# Patient Record
Sex: MALE | Race: WHITE | Employment: UNEMPLOYED | URBAN - METROPOLITAN AREA
[De-identification: names, ages, dates, MRNs, and addresses within clinical notes are randomized per-mention and may not be internally consistent; named-entity substitution may affect disease eponyms.]

---

## 2020-06-27 ENCOUNTER — TRANSFERRED RECORDS (OUTPATIENT)
Dept: HEALTH INFORMATION MANAGEMENT | Facility: CLINIC | Age: 65
End: 2020-06-27

## 2020-06-29 NOTE — PROGRESS NOTES
[facility]  Transfer Triage Note    Date of call: 06/29/20  Time of call: 12:38 PM    Is pandemic COVID-19 a concern? NO    Reason for transfer: Procedure can be done here and not at referring hospital   Diagnosis: Infected pancreatic pseudocyst    Outside Records: Available  Additional records requested to be faxed to [phone].    Stability of Patient: Patient is vitally stable, with no critical labs, and will likely remain stable throughout the transfer process  ICU: No    Expected Time of Arrival for Transfer: 0-8 hours    Arrival Location:  [address] [address] Phone: [phone]    Recommendations for Management and Stabilization: Given    Additional Comments 64 year old male with history of necrotizing fasciitis earlier this year with formation of a pancreatic pseudocyst who was admitted to [facility] due to infection of pancreatic pseudocyst. He is being transferred with recommendation for necrosectomy.     Currently awake, alert, and oriented with stable vitals. Afebrile during current stay, blood pressures in 120-130s/70s. He is currently on zosyn. He had an ERCP done on 6/27 with stent placement. He had NJ tube placement on 6/28 and is tolerating tube feeds.    Imaging will be pushed including: CT Abd and Pelvis 6/29,  ERCP images 6/27, CT Abd and Pelvis 6/19, CTA chest 5/16, CT Abd and Pelvis 5/14.    Justin Chairez, MS4      Addendum     7/1/2020 update: patient remains stable.  NJ fell out, planning to replace unless bed becomes available and then would not delay transfer.  Last negative COVID test 6/27/20.    Christy Salcido MD

## 2020-07-02 ENCOUNTER — HOSPITAL ENCOUNTER (INPATIENT)
Facility: CLINIC | Age: 65
LOS: 14 days | Discharge: HOME | DRG: 405 | End: 2020-07-16
Attending: INTERNAL MEDICINE | Admitting: INTERNAL MEDICINE
Payer: OTHER GOVERNMENT

## 2020-07-02 DIAGNOSIS — K85.91 NECROTIZING PANCREATITIS: Primary | ICD-10-CM

## 2020-07-02 LAB
ALBUMIN SERPL-MCNC: 2.2 G/DL (ref 3.4–5)
ALP SERPL-CCNC: 430 U/L (ref 40–150)
ALT SERPL W P-5'-P-CCNC: 26 U/L (ref 0–70)
ANION GAP SERPL CALCULATED.3IONS-SCNC: 5 MMOL/L (ref 3–14)
AST SERPL W P-5'-P-CCNC: 21 U/L (ref 0–45)
BILIRUB SERPL-MCNC: 0.9 MG/DL (ref 0.2–1.3)
BUN SERPL-MCNC: 5 MG/DL (ref 7–30)
CALCIUM SERPL-MCNC: 8.4 MG/DL (ref 8.5–10.1)
CHLORIDE SERPL-SCNC: 100 MMOL/L (ref 94–109)
CO2 SERPL-SCNC: 28 MMOL/L (ref 20–32)
CREAT SERPL-MCNC: 0.68 MG/DL (ref 0.66–1.25)
ERYTHROCYTE [DISTWIDTH] IN BLOOD BY AUTOMATED COUNT: 16.1 % (ref 10–15)
GFR SERPL CREATININE-BSD FRML MDRD: >90 ML/MIN/{1.73_M2}
GLUCOSE BLDC GLUCOMTR-MCNC: 145 MG/DL (ref 70–99)
GLUCOSE BLDC GLUCOMTR-MCNC: 168 MG/DL (ref 70–99)
GLUCOSE SERPL-MCNC: 142 MG/DL (ref 70–99)
HBA1C MFR BLD: 6.3 % (ref 0–5.6)
HCT VFR BLD AUTO: 31.3 % (ref 40–53)
HGB BLD-MCNC: 9.6 G/DL (ref 13.3–17.7)
INR PPP: 1.19 (ref 0.86–1.14)
MCH RBC QN AUTO: 28.2 PG (ref 26.5–33)
MCHC RBC AUTO-ENTMCNC: 30.7 G/DL (ref 31.5–36.5)
MCV RBC AUTO: 92 FL (ref 78–100)
PLATELET # BLD AUTO: 330 10E9/L (ref 150–450)
POTASSIUM SERPL-SCNC: 3.6 MMOL/L (ref 3.4–5.3)
PROT SERPL-MCNC: 7.3 G/DL (ref 6.8–8.8)
RBC # BLD AUTO: 3.41 10E12/L (ref 4.4–5.9)
SODIUM SERPL-SCNC: 134 MMOL/L (ref 133–144)
WBC # BLD AUTO: 7.1 10E9/L (ref 4–11)

## 2020-07-02 PROCEDURE — 83036 HEMOGLOBIN GLYCOSYLATED A1C: CPT | Performed by: STUDENT IN AN ORGANIZED HEALTH CARE EDUCATION/TRAINING PROGRAM

## 2020-07-02 PROCEDURE — 12000001 ZZH R&B MED SURG/OB UMMC

## 2020-07-02 PROCEDURE — 00000146 ZZHCL STATISTIC GLUCOSE BY METER IP

## 2020-07-02 PROCEDURE — 85027 COMPLETE CBC AUTOMATED: CPT | Performed by: STUDENT IN AN ORGANIZED HEALTH CARE EDUCATION/TRAINING PROGRAM

## 2020-07-02 PROCEDURE — 36415 COLL VENOUS BLD VENIPUNCTURE: CPT | Performed by: STUDENT IN AN ORGANIZED HEALTH CARE EDUCATION/TRAINING PROGRAM

## 2020-07-02 PROCEDURE — 85610 PROTHROMBIN TIME: CPT | Performed by: STUDENT IN AN ORGANIZED HEALTH CARE EDUCATION/TRAINING PROGRAM

## 2020-07-02 PROCEDURE — 99233 SBSQ HOSP IP/OBS HIGH 50: CPT | Mod: GC | Performed by: INTERNAL MEDICINE

## 2020-07-02 PROCEDURE — 99207 ZZC CDG-HISTORY COMP: MEETS EXP. PROBLEM FOCUSED - DOWN CODED LACK OF HPI: CPT | Performed by: INTERNAL MEDICINE

## 2020-07-02 PROCEDURE — 25000128 H RX IP 250 OP 636: Performed by: STUDENT IN AN ORGANIZED HEALTH CARE EDUCATION/TRAINING PROGRAM

## 2020-07-02 PROCEDURE — 80053 COMPREHEN METABOLIC PANEL: CPT | Performed by: STUDENT IN AN ORGANIZED HEALTH CARE EDUCATION/TRAINING PROGRAM

## 2020-07-02 RX ORDER — ONDANSETRON 2 MG/ML
4 INJECTION INTRAMUSCULAR; INTRAVENOUS EVERY 6 HOURS PRN
Status: DISCONTINUED | OUTPATIENT
Start: 2020-07-02 | End: 2020-07-16 | Stop reason: HOSPADM

## 2020-07-02 RX ORDER — LIDOCAINE 40 MG/G
CREAM TOPICAL
Status: DISCONTINUED | OUTPATIENT
Start: 2020-07-02 | End: 2020-07-16 | Stop reason: HOSPADM

## 2020-07-02 RX ORDER — NALOXONE HYDROCHLORIDE 0.4 MG/ML
.1-.4 INJECTION, SOLUTION INTRAMUSCULAR; INTRAVENOUS; SUBCUTANEOUS
Status: DISCONTINUED | OUTPATIENT
Start: 2020-07-02 | End: 2020-07-16 | Stop reason: HOSPADM

## 2020-07-02 RX ORDER — PIPERACILLIN SODIUM, TAZOBACTAM SODIUM 3; .375 G/15ML; G/15ML
3.38 INJECTION, POWDER, LYOPHILIZED, FOR SOLUTION INTRAVENOUS EVERY 6 HOURS
Status: DISCONTINUED | OUTPATIENT
Start: 2020-07-02 | End: 2020-07-08

## 2020-07-02 RX ORDER — AMOXICILLIN 250 MG
1 CAPSULE ORAL 2 TIMES DAILY
Status: DISCONTINUED | OUTPATIENT
Start: 2020-07-02 | End: 2020-07-16 | Stop reason: HOSPADM

## 2020-07-02 RX ORDER — ONDANSETRON 4 MG/1
4 TABLET, ORALLY DISINTEGRATING ORAL EVERY 6 HOURS PRN
Status: DISCONTINUED | OUTPATIENT
Start: 2020-07-02 | End: 2020-07-16 | Stop reason: HOSPADM

## 2020-07-02 RX ORDER — AMOXICILLIN 250 MG
2 CAPSULE ORAL 2 TIMES DAILY
Status: DISCONTINUED | OUTPATIENT
Start: 2020-07-02 | End: 2020-07-16 | Stop reason: HOSPADM

## 2020-07-02 RX ORDER — DEXTROSE MONOHYDRATE 25 G/50ML
25-50 INJECTION, SOLUTION INTRAVENOUS
Status: DISCONTINUED | OUTPATIENT
Start: 2020-07-02 | End: 2020-07-07

## 2020-07-02 RX ORDER — NICOTINE POLACRILEX 4 MG
15-30 LOZENGE BUCCAL
Status: DISCONTINUED | OUTPATIENT
Start: 2020-07-02 | End: 2020-07-07

## 2020-07-02 RX ADMIN — PIPERACILLIN AND TAZOBACTAM 3.38 G: 3; .375 INJECTION, POWDER, FOR SOLUTION INTRAVENOUS at 21:00

## 2020-07-02 ASSESSMENT — ENCOUNTER SYMPTOMS
HEADACHES: 0
ABDOMINAL PAIN: 0
AGITATION: 0
ABDOMINAL DISTENTION: 1
DIZZINESS: 0
SHORTNESS OF BREATH: 0
DIFFICULTY URINATING: 0
MYALGIAS: 0
FREQUENCY: 0
ACTIVITY CHANGE: 0
WEAKNESS: 0
FEVER: 0
COLOR CHANGE: 0
DYSURIA: 0
RHINORRHEA: 0
FATIGUE: 0
PALPITATIONS: 0
SORE THROAT: 0
BACK PAIN: 0
VOMITING: 0
LIGHT-HEADEDNESS: 0
BLOOD IN STOOL: 0
NAUSEA: 0
COUGH: 0
CHEST TIGHTNESS: 0
ARTHRALGIAS: 0
DIARRHEA: 0
CONSTIPATION: 0

## 2020-07-02 ASSESSMENT — ACTIVITIES OF DAILY LIVING (ADL): ADLS_ACUITY_SCORE: 17

## 2020-07-02 NOTE — PLAN OF CARE
Pt brought in by ambulance from [facility] @ 5:50 PM. Per report, admitted for necrotizing pancreatitis, pancreatic pseudocyst -requiring surgery. Patient oriented to room, call light and phone. VSS on RA. Entry point assessment done. 2RN full skin assessment completed. Denies SOB, nausea, pain. Per MD, keep NPO for now. PIV from previous hospital in place, saline locked. UAL in room/halls. Continue with POC.

## 2020-07-02 NOTE — H&P
[facility], [place]    History and Physical - [place] 2 Service        Date of Admission:  7/2/2020    Assessment & Plan   Alexandr Almonte is a 65yo male with a history of HTN and complicated necrotizing pancreatitis with recent CT findings of a complex cystic structure and air within a worsening pseudocyst concerning for infection. He is presenting as a transfer from [facility] for surgical evaluation by GI for the pseudocyst on 07/02/20.    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 Most recent OSH CT imaging (6/28) shows progressive change with a large complex cystic structure in the mid abdomen and a worsening pancreatic pseudocyst with evidence of air concerning for infection. He is currently on Zosyn for suspected infection of the pseudocyst.   - Will call OSH and get imaging pushed to PACS  - GI consult, appreciate recs  - Creon 24,000 TID with meals  - Antibiotics:   - Zosyn 3.375g q6hrs  - Omeprazole 20mg PO BID     #Pancreatitis induced diabetes   - Insulin glargine 7U twice daily while NPO  - sliding scale insulin  - Hypoglycemic protocol    #Superior venous thrombosis, mesenteric venous thrombosis  The patient was initiated on warfarin and most recently on therapeutic dosing of lovenox. On the most recent CTA (6/28), there was no visualization of the splenic thrombosis.   - hold lovenox for possible GI procedure    #HTN  - hold home lisinopril 10mg daily  - monitor BPs       Diet: NPO for Medical/Clinical Reasons Except for: Meds    Fluids: none  DVT Prophylaxis: Pneumatic Compression Devices  Teresa Catheter: not present  Code Status: Full Code           Disposition Plan   Expected discharge:  2 - 3 days, recommended to prior living arrangement once GI intervention.  Entered: Aminah Zepeda MD 07/02/2020, 6:39 PM       The patient's care was discussed with the Attending Physician, Dr. Velázquez.    Aminah Zepeda MD  [address], [place]  Pager: [phone]  Please see sticky note for cross cover information  ______________________________________________________________________    Chief Complaint   Transfer from OSH for surgical eval of pseudocyst    History is obtained from the patient    History of Present Illness   Norberto Almonte is a 64 year old male with a history of well controlled HTN and a diagnosis of idiopathic necrotizing pancreatitis complicated by splenic vein and superior mesenteric vein thrombosis, HARRIS, septic shock, acute hypoxic respiratory failure, pancreatitis induced diabetes, toxic encephalopathy and pseudocyst. Following an extended hospitalization, the patient represented to an OSH ED on 6/26 with complaints of n/v and was transferred to [facility] for further evaluation. Though afebrile and without leukocytosis, CT imaging at the time of OSH admission (6/28) showed progressive change with a large complex cystic structure in the mid abdomen and a worsening pancreatic pseudocyst with evidence of air concerning for infection. BCX were drawn and the patient was started on Zosyn. His lovenox was held in light of any possible procedures. , alk phos 552, Tbili 2.1 and normal LFTs at OSH.The patient was stable and transferred to [facility] for surgical evaluation and continued care.     Review of Systems    Review of Systems   Constitutional: Negative for activity change, fatigue and fever.   HENT: Negative for congestion, mouth sores, rhinorrhea and sore throat.    Respiratory: Negative for cough, chest tightness and shortness of breath.    Cardiovascular: Positive for leg swelling. Negative for chest pain and palpitations.        Minimal  LE swelling to mid-shin    Gastrointestinal: Positive for abdominal distention. Negative for abdominal pain, blood in stool, constipation, diarrhea, nausea and vomiting.   Genitourinary: Negative for difficulty urinating, dysuria and frequency.   Musculoskeletal: Negative for arthralgias, back pain and myalgias.   Skin: Negative for color change and rash.   Neurological: Negative for dizziness, weakness, light-headedness and headaches.   Psychiatric/Behavioral: Negative for agitation and suicidal ideas.       Past Medical History    - Necrotizing pancreatitis with development of pancreatic pseudocyst  - Superior vein thrombosis, mesenteric vein thrombosis  - Pancreatitis induced diabetes, now on insulin  - Hx acute toxic metabolic encephalopathy  - Hx of HARRIS with acute respiratory failure and septic shock during recent hospitalization  - HTN    Past Surgical History   Past surgical history review with no previous surgeries identified.    Social History   Minimal EtOH use, less than a few drinks a month  40yr Hx cigar smoking, no longer smoking    Family History   Father - MI,  at 34  Mother - Breast cancer,     Prior to Admission Medications   ASA 81mg daily  Lisinopril 10mg daily  Creon 24,000 (one capsule) TID with meals  Ondansetron 4mg q4hrs PRN  Lantus 14U subcutaneous twice daily  Omeprazole 20mg PO daily  Tums Prn for heartburn  Lovenox 75mg twice daily   Zosyn 3.375g TID    Allergies   No Known Allergies    Physical Exam   Vital Signs: Temp: 99  F (37.2  C) Temp src: Oral BP: 133/66 Pulse: 87   Resp: 18 SpO2: 95 % O2 Device: None (Room air)    Weight: 171 lbs 0 oz  GEN: walking around room, in no apparent distress  HEENT: nc/at. eomi, perrla. Moist oral mucosa without lesion  CARDIAC: rrr no m/g/r  LUNG: ctab no w/r/r. bilateral symmetric chest expansion.  ABDOMEN: soft, mild distention, mild tenderness to palpation over the epigastric area, no ttp in other quadrants, no guarding or  peritoneal signs. +bs in 4 quadrants. tympanic to percussion. No palpable organomegaly.   MSK/SKIN: skin warm and well-perfused. no rashes. 2+ bilateral pedal pulses. Trace pitting edema of the b/l LE.  NEURO: alert and oriented x 3. no focal neurologic deficits. 5/5 bilateral motor strength.    Data reviewed today: I reviewed all medications, new labs and imaging results over the last 24 hours.    No lab results found in last 7 days.

## 2020-07-02 NOTE — PROGRESS NOTES
Admitted/transferred from: [facility] in [place], [place]  2 RN full   skin assessment completed by Nallely Foreman, RN and Kulwinder Cantu, RN.  Skin assessment finding: issues found existing pressure ulcer (per pt) on left heel, scabbed over and healing   Interventions/actions: WOC consult ordered.     Will continue to monitor.

## 2020-07-03 LAB
ALBUMIN SERPL-MCNC: 2.2 G/DL (ref 3.4–5)
ALP SERPL-CCNC: 409 U/L (ref 40–150)
ALT SERPL W P-5'-P-CCNC: 23 U/L (ref 0–70)
ANION GAP SERPL CALCULATED.3IONS-SCNC: 4 MMOL/L (ref 3–14)
AST SERPL W P-5'-P-CCNC: 23 U/L (ref 0–45)
BILIRUB SERPL-MCNC: 1.1 MG/DL (ref 0.2–1.3)
BUN SERPL-MCNC: 4 MG/DL (ref 7–30)
CALCIUM SERPL-MCNC: 8.6 MG/DL (ref 8.5–10.1)
CHLORIDE SERPL-SCNC: 103 MMOL/L (ref 94–109)
CO2 SERPL-SCNC: 27 MMOL/L (ref 20–32)
CREAT SERPL-MCNC: 0.76 MG/DL (ref 0.66–1.25)
CRP SERPL-MCNC: 100 MG/L (ref 0–8)
ERYTHROCYTE [DISTWIDTH] IN BLOOD BY AUTOMATED COUNT: 16.3 % (ref 10–15)
GFR SERPL CREATININE-BSD FRML MDRD: >90 ML/MIN/{1.73_M2}
GLUCOSE BLDC GLUCOMTR-MCNC: 106 MG/DL (ref 70–99)
GLUCOSE BLDC GLUCOMTR-MCNC: 121 MG/DL (ref 70–99)
GLUCOSE BLDC GLUCOMTR-MCNC: 129 MG/DL (ref 70–99)
GLUCOSE BLDC GLUCOMTR-MCNC: 137 MG/DL (ref 70–99)
GLUCOSE BLDC GLUCOMTR-MCNC: 160 MG/DL (ref 70–99)
GLUCOSE BLDC GLUCOMTR-MCNC: 187 MG/DL (ref 70–99)
GLUCOSE SERPL-MCNC: 126 MG/DL (ref 70–99)
HCT VFR BLD AUTO: 33.9 % (ref 40–53)
HGB BLD-MCNC: 10.2 G/DL (ref 13.3–17.7)
MAGNESIUM SERPL-MCNC: 2 MG/DL (ref 1.6–2.3)
MCH RBC QN AUTO: 27.7 PG (ref 26.5–33)
MCHC RBC AUTO-ENTMCNC: 30.1 G/DL (ref 31.5–36.5)
MCV RBC AUTO: 92 FL (ref 78–100)
PHOSPHATE SERPL-MCNC: 2.8 MG/DL (ref 2.5–4.5)
PLATELET # BLD AUTO: 313 10E9/L (ref 150–450)
POTASSIUM SERPL-SCNC: 3.8 MMOL/L (ref 3.4–5.3)
PROT SERPL-MCNC: 7.1 G/DL (ref 6.8–8.8)
RBC # BLD AUTO: 3.68 10E12/L (ref 4.4–5.9)
SARS-COV-2 PCR COMMENT: NORMAL
SARS-COV-2 RNA SPEC QL NAA+PROBE: NEGATIVE
SARS-COV-2 RNA SPEC QL NAA+PROBE: NORMAL
SODIUM SERPL-SCNC: 135 MMOL/L (ref 133–144)
SPECIMEN SOURCE: NORMAL
SPECIMEN SOURCE: NORMAL
WBC # BLD AUTO: 7.5 10E9/L (ref 4–11)

## 2020-07-03 PROCEDURE — U0003 INFECTIOUS AGENT DETECTION BY NUCLEIC ACID (DNA OR RNA); SEVERE ACUTE RESPIRATORY SYNDROME CORONAVIRUS 2 (SARS-COV-2) (CORONAVIRUS DISEASE [COVID-19]), AMPLIFIED PROBE TECHNIQUE, MAKING USE OF HIGH THROUGHPUT TECHNOLOGIES AS DESCRIBED BY CMS-2020-01-R: HCPCS | Performed by: STUDENT IN AN ORGANIZED HEALTH CARE EDUCATION/TRAINING PROGRAM

## 2020-07-03 PROCEDURE — G0463 HOSPITAL OUTPT CLINIC VISIT: HCPCS

## 2020-07-03 PROCEDURE — 80053 COMPREHEN METABOLIC PANEL: CPT | Performed by: STUDENT IN AN ORGANIZED HEALTH CARE EDUCATION/TRAINING PROGRAM

## 2020-07-03 PROCEDURE — 25000131 ZZH RX MED GY IP 250 OP 636 PS 637: Performed by: STUDENT IN AN ORGANIZED HEALTH CARE EDUCATION/TRAINING PROGRAM

## 2020-07-03 PROCEDURE — 86140 C-REACTIVE PROTEIN: CPT | Performed by: STUDENT IN AN ORGANIZED HEALTH CARE EDUCATION/TRAINING PROGRAM

## 2020-07-03 PROCEDURE — 84100 ASSAY OF PHOSPHORUS: CPT | Performed by: STUDENT IN AN ORGANIZED HEALTH CARE EDUCATION/TRAINING PROGRAM

## 2020-07-03 PROCEDURE — 99207 ZZC CDG-CUT & PASTE-POTENTIAL IMPACT ON LEVEL: CPT | Performed by: HOSPITALIST

## 2020-07-03 PROCEDURE — 00000146 ZZHCL STATISTIC GLUCOSE BY METER IP

## 2020-07-03 PROCEDURE — 99233 SBSQ HOSP IP/OBS HIGH 50: CPT | Mod: GC | Performed by: HOSPITALIST

## 2020-07-03 PROCEDURE — 25000132 ZZH RX MED GY IP 250 OP 250 PS 637: Performed by: STUDENT IN AN ORGANIZED HEALTH CARE EDUCATION/TRAINING PROGRAM

## 2020-07-03 PROCEDURE — 36415 COLL VENOUS BLD VENIPUNCTURE: CPT | Performed by: STUDENT IN AN ORGANIZED HEALTH CARE EDUCATION/TRAINING PROGRAM

## 2020-07-03 PROCEDURE — 85027 COMPLETE CBC AUTOMATED: CPT | Performed by: STUDENT IN AN ORGANIZED HEALTH CARE EDUCATION/TRAINING PROGRAM

## 2020-07-03 PROCEDURE — 83735 ASSAY OF MAGNESIUM: CPT | Performed by: STUDENT IN AN ORGANIZED HEALTH CARE EDUCATION/TRAINING PROGRAM

## 2020-07-03 PROCEDURE — 25000128 H RX IP 250 OP 636: Performed by: STUDENT IN AN ORGANIZED HEALTH CARE EDUCATION/TRAINING PROGRAM

## 2020-07-03 PROCEDURE — 12000001 ZZH R&B MED SURG/OB UMMC

## 2020-07-03 RX ORDER — ASPIRIN 81 MG/1
81 TABLET, CHEWABLE ORAL DAILY
COMMUNITY

## 2020-07-03 RX ORDER — LIDOCAINE 40 MG/G
CREAM TOPICAL
Status: DISCONTINUED | OUTPATIENT
Start: 2020-07-03 | End: 2020-07-04 | Stop reason: HOSPADM

## 2020-07-03 RX ORDER — WARFARIN SODIUM 5 MG/1
5 TABLET ORAL
Status: ON HOLD | COMMUNITY
End: 2020-07-16

## 2020-07-03 RX ORDER — LISINOPRIL 10 MG/1
10 TABLET ORAL DAILY
COMMUNITY

## 2020-07-03 RX ORDER — ONDANSETRON 4 MG/1
4 TABLET, ORALLY DISINTEGRATING ORAL EVERY 8 HOURS PRN
COMMUNITY

## 2020-07-03 RX ORDER — MULTIPLE VITAMINS W/ MINERALS TAB 9MG-400MCG
1 TAB ORAL DAILY
COMMUNITY

## 2020-07-03 RX ORDER — CHLORAL HYDRATE 500 MG
2 CAPSULE ORAL DAILY
COMMUNITY

## 2020-07-03 RX ADMIN — INSULIN ASPART 1 UNITS: 100 INJECTION, SOLUTION INTRAVENOUS; SUBCUTANEOUS at 16:25

## 2020-07-03 RX ADMIN — PIPERACILLIN AND TAZOBACTAM 3.38 G: 3; .375 INJECTION, POWDER, FOR SOLUTION INTRAVENOUS at 16:23

## 2020-07-03 RX ADMIN — DOCUSATE SODIUM AND SENNOSIDES 1 TABLET: 8.6; 5 TABLET ORAL at 20:21

## 2020-07-03 RX ADMIN — PANCRELIPASE 1 CAPSULE: 24000; 76000; 120000 CAPSULE, DELAYED RELEASE PELLETS ORAL at 18:03

## 2020-07-03 RX ADMIN — PIPERACILLIN AND TAZOBACTAM 3.38 G: 3; .375 INJECTION, POWDER, FOR SOLUTION INTRAVENOUS at 10:48

## 2020-07-03 RX ADMIN — PANCRELIPASE 24000 UNITS: 24000; 76000; 120000 CAPSULE, DELAYED RELEASE PELLETS ORAL at 14:37

## 2020-07-03 RX ADMIN — INSULIN GLARGINE 7 UNITS: 100 INJECTION, SOLUTION SUBCUTANEOUS at 10:51

## 2020-07-03 RX ADMIN — PIPERACILLIN AND TAZOBACTAM 3.38 G: 3; .375 INJECTION, POWDER, FOR SOLUTION INTRAVENOUS at 03:49

## 2020-07-03 RX ADMIN — OMEPRAZOLE 20 MG: 20 CAPSULE, DELAYED RELEASE ORAL at 16:25

## 2020-07-03 RX ADMIN — INSULIN ASPART 1 UNITS: 100 INJECTION, SOLUTION INTRAVENOUS; SUBCUTANEOUS at 20:19

## 2020-07-03 RX ADMIN — INSULIN GLARGINE 7 UNITS: 100 INJECTION, SOLUTION SUBCUTANEOUS at 20:19

## 2020-07-03 RX ADMIN — PIPERACILLIN AND TAZOBACTAM 3.38 G: 3; .375 INJECTION, POWDER, FOR SOLUTION INTRAVENOUS at 21:26

## 2020-07-03 ASSESSMENT — ACTIVITIES OF DAILY LIVING (ADL)
ADLS_ACUITY_SCORE: 17

## 2020-07-03 NOTE — PROGRESS NOTES
"CLINICAL NUTRITION SERVICES - ASSESSMENT NOTE     Nutrition Prescription    RECOMMENDATIONS FOR MDs/PROVIDERS TO ORDER:  1. Recommend check fecal elastase to confirm pancreatic insufficiency and/or to assess severity of deficiency    2. Recommend increase Creon dose to 2-3 capsules Creon 24 with meals TID (640-960 units lipase/kg/meal) + PRN 2 capsules Creon 24 with snacks.      3. If/when FT placed and plan to start TF, please consult Registered Dietitian to Order TF per Medical Nutrition Therapy Guidelines.      Malnutrition Status:    Severe malnutrition in the context of acute illness    Recommendations already ordered by Registered Dietitian (RD):  Mg++ and Phos add-ons    Future/Additional Recommendations:  1. If plan to start TF, recommend the following:  -- Start Peptamen 1.5 (semi-elemental, fiber free) @ 10 mL/hr and advance by 10 mL Q8H as tolerated to goal rate 65 ml/hr  -- DO NOT start or advance TF unless K+/Mg++ >/= nrml and phos >1.9.  Pt is at refeeding risk.  -- 30 mL Q4H free water flushes at minimum for FT patency (does not provide full hydration).  -- GOAL: Peptamen 1.5 @ goal 65 ml/hr (1560 ml/day) to provide 2340 kcals (31 kcal/kg/day), 106 g PRO (1.4 g/kg/day), 1201 ml free H2O, 87 g Fat (70% from MCTs), 293 g CHO and no Fiber daily.     2. Monitor stooling/GI symptoms once start TF with semi-elemental formula.  If with bloating, steatorhea, or other signs of malabsorption consider need to mix enzymes into formula.     Unable to obtain in-person nutrition history or nutrition focused physical assessment (NFPA) from patient as the number of staff going into rooms is restricted to limit exposure and to minimize use of PPE.     REASON FOR ASSESSMENT  Norberto Almonte is a/an 64 year old male assessed by the dietitian for Provider Order - \"Malnutrician, poor PO, PERT adjustment\"    NUTRITION HISTORY  Obtained information from patient over the phone + chart review.  Pt report poor PO intake over " "the past 2 weeks 2/2 bloating, and overall decreased PO intake and weight loss since April 2020 when he was initially hospitalized.  Pt reports before April pt was on a regular diet and eating well/at his baseline.  Pt denies any known food allergies. Pt reports over the past 2 weeks he's been eating small amounts of \"light\" food (cereal, grapes, carrots, cheese, cranberry juice, orange juice, water, etc).  Pt says he started taking Creon in May (1 capsule with meals) but pt denies any improvements in diarrhea or pain with eating since starting this.       Per chart, PMH includes necrotizing pancreatitis with pancreatic pseudocyst.  Per WOC note today, pt with resolving stage 3 vs 4 pressure injury on left posterior heel    CURRENT NUTRITION ORDERS  Diet: Full Liquid  Intake/Tolerance: poor PO intake tolerance per patient today    LABS  Labs reviewed  - BUN 4 (L), may indicate low protein intake    MEDICATIONS  Medications reviewed  - Creon 24, 1 capsules TID with meals (this provides 320 units lipase/kg/meal, below recommended dosing range to 500-2500 units lipase/kg/meal)    ANTHROPOMETRICS  Height: 175.3 cm (5' 9\") per Care Everywhere  Most Recent Weight: 77.6 kg (171 lb)    IBW: 72.7 kg   BMI: Overweight BMI 25-29.9  Weight History: Pt reports his current weight is down 30 lb overall since 4/28/20 when he was initially hospitalized.  Per data below pt with 36 lb wt loss (18% wt loss) from 4/30-6/26.  Wt Readings from Last 10 Encounters:   07/02/20 77.6 kg (171 lb)   06/26/20 74.8 kg (165 lb) per Care Everywhere   04/30/20 91.3 kg (201 lb 4.8 oz) per Care Everywhere      Dosing Weight: 75 kg (actual, lowest recent wt on 6/26)    ASSESSED NUTRITION NEEDS  Estimated Energy Needs: 4279-7953+ kcals/day (30 - 35+ kcals/kg )  Justification: Increased needs with necrotizing pancreatitis, wound healing  Estimated Protein Needs: + grams protein/day (1.3 - 1.5+ grams of pro/kg)  Justification: Increased needs with " necrotizing pancreatitis, wound healing  Estimated Fluid Needs: 9124-9666 mL/day (1 mL/kcal)   Justification: Maintenance, or other per provider pending fluid status    PHYSICAL FINDINGS  See malnutrition section below.    MALNUTRITION  % Intake: </= 50% for >/= 5 days (severe)  % Weight Loss: > 7.5% in 3 months (severe)  Subcutaneous Fat Loss: Unable to assess  Muscle Loss: Unable to assess  Fluid Accumulation/Edema: None noted per chart review  Malnutrition Diagnosis: Severe malnutrition in the context of acute illness    Unable to obtain nutrition focused physical assessment (NFPA) from patient as the number of staff going into rooms is restricted to limit exposure and to minimize use of PPE. NFPA available per provider request.    NUTRITION DIAGNOSIS  Inadequate oral intake related to bloating/poor PO intake tolerance 2/2 pancreatitis as evidenced by poor PO intakes and unintentional wt lo    INTERVENTIONS  Implementation  Nutrition Education: Provided education on role of RD.  Discussed Creon dose recommendations RD will make to provider.   Briefly discussed low fat diet often recommended during pancreatitis.  Pt does not have any questions for RD today, but says he will likely have questions after his surgery tomorrow.  Let pt know of RD availability.  Collaboration with other providers: paged team with above recs    Goals  Diet adv v nutrition support within 2-3 days.     Monitoring/Evaluation  Progress toward goals will be monitored and evaluated per protocol.     Lauren Carrasco, BELÉN, LD  7C RD pager: [phone]

## 2020-07-03 NOTE — PROGRESS NOTES
[facility], [place]    Progress Note - [place] 2 Service        Date of Admission:  7/2/2020    Assessment & Plan   Alexandr Almonte is a 63yo male with a history of HTN and complicated necrotizing pancreatitis with recent CT findings of a complex cystic structure and air within a worsening pseudocyst concerning for infection. He is presenting as a transfer from [facility] for surgical evaluation by GI for the pseudocyst on 07/02/20.     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 Most recent OSH CT imaging (6/28) shows progressive change with a large complex cystic structure in the mid abdomen and a worsening pancreatic pseudocyst with evidence of air concerning for infection. He is currently on Zosyn for suspected infection of the pseudocyst. GI evaluated the patient and suspects that the gas in the collection is due to fistula and that the pseudocyst is causing gastric outlet obstruction. They plan for EUS transgastric drainage tomorrow with NJ placement.   - GI consult, appreciate recs   - Will go to OR tomorrow   - STAT Covid test for OR tomorrow  - Dietician consulted, appreciate recs   - Creon 2-3 capsules of 24,000 plus PRN 2 capsules with snacks TID with meals   - Fecal elastase ordered  - Antibiotics:              - Zosyn 3.375g q6hrs  - Omeprazole 20mg PO BID     #Pancreatitis induced diabetes   - Insulin glargine 7U twice daily while NPO  - sliding scale  insulin  - Hypoglycemic protocol     #Superior venous thrombosis, mesenteric venous thrombosis  The patient was initiated on warfarin and most recently on therapeutic dosing of lovenox. On the most recent CTA (6/28), there was no visualization of the splenic thrombosis.   - hold lovenox for possible GI procedure     #HTN  - hold home lisinopril 10mg daily  - monitor BPs        Diet: NPO for Medical/Clinical Reasons Except for: Meds    Fluids: none  DVT Prophylaxis: Pneumatic Compression Devices  Teresa Catheter: not present  Code Status: Full Code      Disposition Plan   Expected discharge: 2 - 3 days, recommended to prior living arrangement once adequate pain management/ tolerating PO medications and full surgical recovery.  Entered: Aminah Zepeda MD 07/03/2020, 2:41 PM       The patient's care was discussed with the Attending Physician, Dr. Barrow.    Aminah Zepeda MD  [address], [place]  Pager: [phone]  Please see sticky note for cross cover information  ______________________________________________________________________    Interval History   Patient had no events overnight. VSS, afebrile. He states that he feels as though his 'bloating' is worst this morning, but has no pain, nausea, or vomiting. He had a small, soft BM yesterday.     4pt review of systems negative except as indicated in HPI.    Data reviewed today: I reviewed all medications, new labs and imaging results over the last 24 hours.     Physical Exam   Vital Signs: Temp: 98.9  F (37.2  C) Temp src: Oral BP: 130/70 Pulse: 88   Resp: 16 SpO2: 96 % O2 Device: None (Room air)    Weight: 171 lbs 0 oz  GEN: resting comfortably in bed, appears in no apparent distress  HEENT: nc/at. eomi, perrla. Oral/nasal mucosa moist without ulceration  CARDIAC: rrr no m/g/r. no JVD noted.  LUNG: ctab no w/r/r. bilateral symmetric chest expansion.  ABDOMEN: soft, distended, non-tender to palpation. +bs in 4  quadrants. tympanic to percussion. No palpable organomegaly  MSK/SKIN: skin warm and well-perfused. no rashes. 2+ bilateral pedal pulses. , trace LE edema to the mid shin b/l  NEURO: alert and oriented x 3. no focal neurologic deficits. 5/5 bilateral motor strength.      Data   Reviewed.

## 2020-07-03 NOTE — PHARMACY-ADMISSION MEDICATION HISTORY
Admission medication history interview status for the 7/2/2020 admission is complete. See Epic admission navigator for allergy information, pharmacy, prior to admission medications and immunization status.     Medication history interview sources:  patient, [facility] [place], .[phone]    Changes made to PTA medication list (reason)  Added: all  Deleted: none  Changed: none    Additional medication history information (including reliability of information, actions taken by pharmacist):  -pt was somewhat unreliable historian, and was not able to articulate all meds by name and dosing. Information was clarified with [facility] pharmacy records  -pt could not recall exactly the last day he took warfarin  -on 6/21 pt filled enoxaparin 75 mg SQ q12h x4 doses, presumably as a bridge for warfarin, but this was not added to his PTA list as he completed the therapy      Prior to Admission medications    Medication Sig Last Dose Taking? Auth Provider   amylase-lipase-protease (CREON 24) 92538-13776 units CPEP per EC capsule Take 1 capsule by mouth 3 times daily (with meals) Past Week at Unknown time Yes Unknown, Entered By History   aspirin (ASA) 81 MG chewable tablet Take 81 mg by mouth daily Past Month at Unknown time Yes Unknown, Entered By History   fish oil-omega-3 fatty acids 1000 MG capsule Take 2 g by mouth daily Past Week at Unknown time Yes Unknown, Entered By History   lisinopril (ZESTRIL) 10 MG tablet Take 10 mg by mouth daily Past Week at Unknown time Yes Unknown, Entered By History   multivitamin w/minerals (THERA-VIT-M) tablet Take 1 tablet by mouth daily Past Week at Unknown time Yes Unknown, Entered By History   ondansetron (ZOFRAN-ODT) 4 MG ODT tab Take 4 mg by mouth every 8 hours as needed for nausea Past Week at Unknown time Yes Unknown, Entered By History   warfarin ANTICOAGULANT (COUMADIN) 5 MG tablet 5 mg Take 7.5 mg Tuesdays and Thursdays, take 5 mg rest of week (40 mg/week) Past Week at Unknown  time Yes Unknown, Entered By History       Medication history completed by:   Jasbir Mcgrath, PharmD, BCPS

## 2020-07-03 NOTE — PLAN OF CARE
Alert, oriented, UAL.    Denies pain, nausea. Voiding, passing gas, BM yesterday.  Started on IV abx.  Q4 BGs, has not needed Novalog.  NPO for possible GI procedure today.

## 2020-07-03 NOTE — PLAN OF CARE
Transfer from [facility] for surgical evaluation by GI for the pseudocyst on 07/02/20    Pain: denies   Nausea: denies   Lab: BS Q4- no coverage needed this shift   /70 (BP Location: Right arm)   Pulse 88   Temp 98.9  F (37.2  C) (Oral)   Resp 16   Wt 77.6 kg (171 lb)   SpO2 96%    Output: Voiding, not saving   Diet: Full liquid- NPO @ midnight   Activity: UAL   Bowel Function: Bowel sounds heard in all quadrants, passing flatus, no bm this shift   Lines: L PIV, infusing TKO in between abx, dressing CDI   Skin: L posterior heel resolving wound- seen by WOC today  Plan: NPO at midnight, plan for Esophagogastroduodenoscopy, with endoscopic excision of necrotic pancreatic tissue/ cystgastrostomy with NJ per MD note. Continue with plan of care.

## 2020-07-03 NOTE — PLAN OF CARE
OT/PT: orders received. Per discussion with RN and chart review, plans for surgery this date. Per RN, pt moving around ok in room. Will re-assess for OT/PT needs post procedure.

## 2020-07-03 NOTE — CONSULTS
[facility] Nurse Inpatient Pressure Injury Assessment   Reason for consultation: Evaluate and treat Left heel wound      ASSESSMENT  Pressure Injury: on Left posterior heel  , present on admission ,   Pressure Injury is resolving Stage  3 vs 4   Status: initial assessment     TREATMENT PLAN  Left heel wound: OK to leave open to air.    If pt requires assist with bed mobility:  Cleanse with soap and water, rinse and pat dry  Cover with mepilex border dressing    Orders Written  WOC Nurse follow-up plan:signing off  Nursing to notify the Provider(s) and re-consult the WO Nurse if wound(s) deteriorates or new skin concern.    Patient History  According to provider note(s):  63yo male with a history of HTN and complicated necrotizing pancreatitis with recent CT findings of a complex cystic structure and air within a worsening pseudocyst concerning for infection. He is presenting as a transfer from [facility] for surgical evaluation by GI for the pseudocyst on 07/02/20.    Objective Data    Current Diet/ Nutrition:  Orders Placed This Encounter      NPO per Anesthesia Guidelines for Procedure/Surgery Except for: Meds      Full Liquid Diet      Output:   I/O last 3 completed shifts:  In: 200 [I.V.:200]  Out: -     Risk Assessment:   Sensory Perception: 4-->no impairment  Moisture: 4-->rarely moist  Activity: 3-->walks occasionally  Mobility: 4-->no limitation  Nutrition: 3-->adequate  Friction and Shear: 3-->no apparent problem  Keo Score: 21      Labs:   Recent Labs   Lab 07/03/20  0721 07/02/20  2105   ALBUMIN 2.2* 2.2*   HGB 10.2* 9.6*   INR  --  1.19*   WBC 7.5 7.1   A1C  --  6.3*   .0*  --        Physical Exam  Skin inspection: focused bilateral feet:  Skin pale, dry.  1-2+ pitting edema in feet.  PP faint, DP +  Lateral LEs hairless, pt states that this is from cowboy boots    Wound Location:  Left posterior heel        Date of last Photo 7/3/20  Wound History: per pt:  Wound occurred while in ICU in  May of this year.    Measurements (length x width x depth, in cm) 1.2 cm x 0.8 cm  x 0.1 cm   Wound Base:  100 % thick dark red fibrinous scab   Tunneling N/A  Undermining N/A  Palpation of the wound bed: normal   Periwound skin: dry/scaly  Color: normal and consistent with surrounding tissue  Temperature: normal   Drainage:, none  Odor: none  Pain: denies ,     Interventions  Current support surface: Standard  Atmos Air mattress  Current off-loading measures: independent.  sleeps on his side   Visual inspection of wound(s) completed   Wound Care: unnecessary   Educated provided: plan of care, wound progress and Off-loading pressure  Education provided to: patient   Discussed plan of care with Nurse

## 2020-07-03 NOTE — CONSULTS
GASTROENTEROLOGY CONSULTATION    Date: 07/03/2020  Date of Admission: 7/2/2020  Reason for Consultation: Necrotizing pancreatitis  Requested by:   Dr. Velázquez  Brief Summary:   We were asked by Dr. Velázquez to evaluate this patient with necrotizing pancreatitis with infected necrosis      ASSESSMENT AND RECOMMENDATIONS:   Assessment:  64 year old male with a history of idiopathic pancreatitis and recent hospital stay in April complicated by ileus, SMV thrombosis and splenic vein thrombosis.    -- Etiology:Idiopathic  -- Date of onset: 4/28  -- BISAP score:   -- Fluid collection               -- Infected: Y               -- Abx: Zosyn  -- Concurrent organ failure: N/A  -- Nutrition:                -- GJ Tube: N/A               -- PERT: Y  -- Necrosectomy- initial  -- Next Necrosectomy -preceding CT  -- Last CT: 6/28  -- Interventions: N/A               -- Date  -- Drains: N/A               -- amount/frequency- internal/external  -- Thrombosis: Y  -- Surgery consult: N    Pancreatic pseudocyst possibly resulting in gastric outlet obstruction  Patient had the recent CT showing possibility of air inside the pseudocyst which can either be due to fistulization of pseudocyst with the bowel or an infected pseudocyst.  Patient is afebrile and denies any previous fever or chills, less likely to be infected.  Patient would require an EUS with cystogastrostomy for drainage of the pseudocyst.  He may also require NJ placement for nutrition.    Diarrhea  Patient has been on PERT which likely is underdosed, would benefit from adjusting according to meals and a nutrition consultation.    Recommendations  --Plan for EUS with cyst gastrostomy and NJ placement tomorrow  --Please get a COVID test stat today  --Recommend PERT adjustment with dietician  --Nutrition consultation today  --Keep n.p.o. after midnight and hold anticoagulation    Gastroenterology outpatient follow up recommendations: Pending clinical course    Thank you for  involving us in this patient's care. Please do not hesitate to contact the GI service with any questions or concerns.     Pt care plan discussed with Dr. Almaguer, GI staff physician.    This note was created with voice recognition software, and while reviewed for accuracy, typos may remain.    Brianda Solis MD  GI Fellow  Pager: [phone]  -------------------------------------------------------------------------------------------------------------------           History of Present Illness:   Norberto Almonte is a 64 year old male with a history of idiopathic pancreatitis and recent hospital stay in April complicated by ileus, SMV thrombosis and splenic vein thrombosis.    Patient stated that his initial episode of pancreatitis happened on 4/28 when he was admitted to a local hospital, few days later he was transferred to [facility] where ultimately he was intubated then discharged on 5/21.  Post discharge patient was having issues with diarrhea.  He presented to [facility] again on 6/26 with diarrhea, denied fever or chills but had abdominal pain and vomiting.  Patient was transferred to [facility] for the concern of infected necrotic necrosis.    CT on 4/28 showed erythematous changes of pancreas with peripancreatic edema and fluid.   5/14 CT showing pancreatic necrosis with fluid-filled pancreatic bed probable splenic vein thrombosis  CT 6/12 showed possibility of a pseudocyst  CT 6/28 showed progressive pancreatic pseudocyst with air and pseudocyst.              Past Medical History:   Reviewed and edited as appropriate  No past medical history on file.         Past Surgical History:   Reviewed and edited as appropriate   No past surgical history on file.         Previous Endoscopy:   No results found for this or any previous visit.         Social History:   Reviewed and edited as appropriate  Social History     Socioeconomic History     Marital status: Single     Spouse name: Not on file     Number of children: Not on  file     Years of education: Not on file     Highest education level: Not on file   Occupational History     Not on file   Social Needs     Financial resource strain: Not on file     Food insecurity     Worry: Not on file     Inability: Not on file     Transportation needs     Medical: Not on file     Non-medical: Not on file   Tobacco Use     Smoking status: Not on file   Substance and Sexual Activity     Alcohol use: Not on file     Drug use: Not on file     Sexual activity: Not on file   Lifestyle     Physical activity     Days per week: Not on file     Minutes per session: Not on file     Stress: Not on file   Relationships     Social connections     Talks on phone: Not on file     Gets together: Not on file     Attends Gnosticist service: Not on file     Active member of club or organization: Not on file     Attends meetings of clubs or organizations: Not on file     Relationship status: Not on file     Intimate partner violence     Fear of current or ex partner: Not on file     Emotionally abused: Not on file     Physically abused: Not on file     Forced sexual activity: Not on file   Other Topics Concern     Not on file   Social History Narrative     Not on file            Family History:   Reviewed and edited as appropriate  No family history on file.           Allergies:   Reviewed and edited as appropriate   No Known Allergies         Medications:     Current Facility-Administered Medications   Medication     amylase-lipase-protease (CREON 24) 72629-89978 units per EC capsule 24,000 Units     glucose gel 15-30 g    Or     dextrose 50 % injection 25-50 mL    Or     glucagon injection 1 mg     insulin aspart (NovoLOG) injection (RAPID ACTING)     insulin glargine (LANTUS PEN) injection 7 Units     lidocaine (LMX4) cream     lidocaine 1 % 0.1-1 mL     melatonin tablet 1 mg     naloxone (NARCAN) injection 0.1-0.4 mg     omeprazole (priLOSEC) CR capsule 20 mg     ondansetron (ZOFRAN-ODT) ODT tab 4 mg    Or      ondansetron (ZOFRAN) injection 4 mg     piperacillin-tazobactam (ZOSYN) 3.375 g vial to attach to  mL bag     senna-docusate (SENOKOT-S/PERICOLACE) 8.6-50 MG per tablet 1 tablet    Or     senna-docusate (SENOKOT-S/PERICOLACE) 8.6-50 MG per tablet 2 tablet     sodium chloride (PF) 0.9% PF flush 3 mL     sodium chloride (PF) 0.9% PF flush 3 mL             Review of Systems:     A complete 10 point review of systems was performed and is negative except as noted in the HPI           Physical Exam:   /54 (BP Location: Right arm)   Pulse 82   Temp 98.8  F (37.1  C) (Oral)   Resp 18   Wt 77.6 kg (171 lb)   SpO2 93%   Wt:   Wt Readings from Last 2 Encounters:   07/02/20 77.6 kg (171 lb)      Constitutional: cooperative  Eyes: Sclera anicteric  Ears/nose/mouth/throat: mucus membranes moist  Neck: supple  CV: No edema  Respiratory: Unlabored breathing  Abd: Nondistended, +bs, nontender, no peritoneal signs  Skin: warm, perfused  Neuro: AAO x 3,  Psych: Normal affect  MSK: No gross deformities         Data:   Labs and imaging below were independently reviewed and interpreted    BMP  Recent Labs   Lab 07/02/20 2105      POTASSIUM 3.6   CHLORIDE 100   BARAK 8.4*   CO2 28   BUN 5*   CR 0.68   *     CBC  Recent Labs   Lab 07/02/20 2105   WBC 7.1   RBC 3.41*   HGB 9.6*   HCT 31.3*   MCV 92   MCH 28.2   MCHC 30.7*   RDW 16.1*        INR  Recent Labs   Lab 07/02/20 2105   INR 1.19*     LFTs  Recent Labs   Lab 07/02/20 2105   ALKPHOS 430*   AST 21   ALT 26   BILITOTAL 0.9   PROTTOTAL 7.3   ALBUMIN 2.2*      PANCNo lab results found in last 7 days.    Imaging:  CT ABD 6/28:    IMPRESSION: Progressive pancreatic pseudocyst with inflammatory change extending to the colon, stomach, peripancreatic region as well. There is some air within the main pseudocyst pocket which could represent gas-forming bacteria. There is some progression of the biliary ductal dilation as well.    CT ABD 6/12:  IMPRESSION:    1.  Changes probably related to a previous massive pancreatitis with probable necrosis of pancreas and replacement of the pancreas by a large complex appearing pseudocyst with smaller pseudocysts noted adjacently. Inflammatory changes are noted adjacent to these pseudocysts.  2.  Dilated CBD and central hepatic ducts due to mass effect from the above-mentioned pseudocyst formation.  3.  2 cm cyst located within the caudate lobe.  4.  Atelectasis at the lung bases with small left pleural effusion.  5.  Ring-enhancing lesion involving the inferomedial right gluteus medius muscle which may represent hematoma or even abscess formation.  6.  Edema involving the subcutaneous fat inferior to the coccyx, the edema extending slightly into the adjacent gluteus medius muscles, more so on the left.

## 2020-07-04 ENCOUNTER — ANESTHESIA EVENT (OUTPATIENT)
Dept: SURGERY | Facility: CLINIC | Age: 65
DRG: 405 | End: 2020-07-04
Payer: OTHER GOVERNMENT

## 2020-07-04 ENCOUNTER — APPOINTMENT (OUTPATIENT)
Dept: GENERAL RADIOLOGY | Facility: CLINIC | Age: 65
DRG: 405 | End: 2020-07-04
Attending: INTERNAL MEDICINE
Payer: OTHER GOVERNMENT

## 2020-07-04 ENCOUNTER — ANESTHESIA (OUTPATIENT)
Dept: SURGERY | Facility: CLINIC | Age: 65
DRG: 405 | End: 2020-07-04
Payer: OTHER GOVERNMENT

## 2020-07-04 LAB
ABO + RH BLD: NORMAL
ABO + RH BLD: NORMAL
ALBUMIN SERPL-MCNC: 2.2 G/DL (ref 3.4–5)
ALP SERPL-CCNC: 471 U/L (ref 40–150)
ALT SERPL W P-5'-P-CCNC: 25 U/L (ref 0–70)
ANION GAP SERPL CALCULATED.3IONS-SCNC: 6 MMOL/L (ref 3–14)
AST SERPL W P-5'-P-CCNC: 32 U/L (ref 0–45)
BILIRUB SERPL-MCNC: 1.2 MG/DL (ref 0.2–1.3)
BLD GP AB SCN SERPL QL: NORMAL
BLOOD BANK CMNT PATIENT-IMP: NORMAL
BUN SERPL-MCNC: 4 MG/DL (ref 7–30)
BUN SERPL-MCNC: 5 MG/DL (ref 7–30)
CALCIUM SERPL-MCNC: 8.8 MG/DL (ref 8.5–10.1)
CHLORIDE SERPL-SCNC: 105 MMOL/L (ref 94–109)
CO2 SERPL-SCNC: 24 MMOL/L (ref 20–32)
CREAT SERPL-MCNC: 0.7 MG/DL (ref 0.66–1.25)
ERYTHROCYTE [DISTWIDTH] IN BLOOD BY AUTOMATED COUNT: 16.4 % (ref 10–15)
ERYTHROCYTE [DISTWIDTH] IN BLOOD BY AUTOMATED COUNT: 16.7 % (ref 10–15)
GFR SERPL CREATININE-BSD FRML MDRD: >90 ML/MIN/{1.73_M2}
GLUCOSE BLDC GLUCOMTR-MCNC: 106 MG/DL (ref 70–99)
GLUCOSE BLDC GLUCOMTR-MCNC: 118 MG/DL (ref 70–99)
GLUCOSE BLDC GLUCOMTR-MCNC: 124 MG/DL (ref 70–99)
GLUCOSE BLDC GLUCOMTR-MCNC: 151 MG/DL (ref 70–99)
GLUCOSE BLDC GLUCOMTR-MCNC: 226 MG/DL (ref 70–99)
GLUCOSE BLDC GLUCOMTR-MCNC: 236 MG/DL (ref 70–99)
GLUCOSE SERPL-MCNC: 133 MG/DL (ref 70–99)
HCT VFR BLD AUTO: 28.6 % (ref 40–53)
HCT VFR BLD AUTO: 35.9 % (ref 40–53)
HGB BLD-MCNC: 10.7 G/DL (ref 13.3–17.7)
HGB BLD-MCNC: 8.7 G/DL (ref 13.3–17.7)
INR PPP: 1.18 (ref 0.86–1.14)
MAGNESIUM SERPL-MCNC: 2 MG/DL (ref 1.6–2.3)
MCH RBC QN AUTO: 27.8 PG (ref 26.5–33)
MCH RBC QN AUTO: 28.2 PG (ref 26.5–33)
MCHC RBC AUTO-ENTMCNC: 29.8 G/DL (ref 31.5–36.5)
MCHC RBC AUTO-ENTMCNC: 30.4 G/DL (ref 31.5–36.5)
MCV RBC AUTO: 93 FL (ref 78–100)
MCV RBC AUTO: 93 FL (ref 78–100)
PHOSPHATE SERPL-MCNC: 2.8 MG/DL (ref 2.5–4.5)
PLATELET # BLD AUTO: 286 10E9/L (ref 150–450)
PLATELET # BLD AUTO: 443 10E9/L (ref 150–450)
POTASSIUM SERPL-SCNC: 4.1 MMOL/L (ref 3.4–5.3)
PROT SERPL-MCNC: 7.7 G/DL (ref 6.8–8.8)
RBC # BLD AUTO: 3.09 10E12/L (ref 4.4–5.9)
RBC # BLD AUTO: 3.85 10E12/L (ref 4.4–5.9)
SODIUM SERPL-SCNC: 135 MMOL/L (ref 133–144)
SPECIMEN EXP DATE BLD: NORMAL
UPPER EUS: NORMAL
WBC # BLD AUTO: 5.5 10E9/L (ref 4–11)
WBC # BLD AUTO: 9.8 10E9/L (ref 4–11)

## 2020-07-04 PROCEDURE — C1769 GUIDE WIRE: HCPCS | Performed by: INTERNAL MEDICINE

## 2020-07-04 PROCEDURE — 12000001 ZZH R&B MED SURG/OB UMMC

## 2020-07-04 PROCEDURE — 25000125 ZZHC RX 250: Performed by: INTERNAL MEDICINE

## 2020-07-04 PROCEDURE — 00000146 ZZHCL STATISTIC GLUCOSE BY METER IP

## 2020-07-04 PROCEDURE — 0F7D8DZ DILATION OF PANCREATIC DUCT WITH INTRALUMINAL DEVICE, VIA NATURAL OR ARTIFICIAL OPENING ENDOSCOPIC: ICD-10-PCS | Performed by: INTERNAL MEDICINE

## 2020-07-04 PROCEDURE — 80053 COMPREHEN METABOLIC PANEL: CPT | Performed by: STUDENT IN AN ORGANIZED HEALTH CARE EDUCATION/TRAINING PROGRAM

## 2020-07-04 PROCEDURE — 25000132 ZZH RX MED GY IP 250 OP 250 PS 637: Performed by: STUDENT IN AN ORGANIZED HEALTH CARE EDUCATION/TRAINING PROGRAM

## 2020-07-04 PROCEDURE — 40000170 ZZH STATISTIC PRE-PROCEDURE ASSESSMENT II: Performed by: INTERNAL MEDICINE

## 2020-07-04 PROCEDURE — 86901 BLOOD TYPING SEROLOGIC RH(D): CPT | Performed by: INTERNAL MEDICINE

## 2020-07-04 PROCEDURE — 86850 RBC ANTIBODY SCREEN: CPT | Performed by: INTERNAL MEDICINE

## 2020-07-04 PROCEDURE — 25000128 H RX IP 250 OP 636: Performed by: INTERNAL MEDICINE

## 2020-07-04 PROCEDURE — 37000009 ZZH ANESTHESIA TECHNICAL FEE, EACH ADDTL 15 MIN: Performed by: INTERNAL MEDICINE

## 2020-07-04 PROCEDURE — 85610 PROTHROMBIN TIME: CPT | Performed by: INTERNAL MEDICINE

## 2020-07-04 PROCEDURE — 71000015 ZZH RECOVERY PHASE 1 LEVEL 2 EA ADDTL HR: Performed by: INTERNAL MEDICINE

## 2020-07-04 PROCEDURE — 36000059 ZZH SURGERY LEVEL 3 EA 15 ADDTL MIN UMMC: Performed by: INTERNAL MEDICINE

## 2020-07-04 PROCEDURE — 36000061 ZZH SURGERY LEVEL 3 W FLUORO 1ST 30 MIN - UMMC: Performed by: INTERNAL MEDICINE

## 2020-07-04 PROCEDURE — 99233 SBSQ HOSP IP/OBS HIGH 50: CPT | Mod: GC | Performed by: HOSPITALIST

## 2020-07-04 PROCEDURE — 36415 COLL VENOUS BLD VENIPUNCTURE: CPT | Performed by: INTERNAL MEDICINE

## 2020-07-04 PROCEDURE — 25800030 ZZH RX IP 258 OP 636: Performed by: NURSE ANESTHETIST, CERTIFIED REGISTERED

## 2020-07-04 PROCEDURE — 25000132 ZZH RX MED GY IP 250 OP 250 PS 637: Performed by: INTERNAL MEDICINE

## 2020-07-04 PROCEDURE — 25000128 H RX IP 250 OP 636: Performed by: NURSE ANESTHETIST, CERTIFIED REGISTERED

## 2020-07-04 PROCEDURE — 86900 BLOOD TYPING SEROLOGIC ABO: CPT | Performed by: INTERNAL MEDICINE

## 2020-07-04 PROCEDURE — 25000566 ZZH SEVOFLURANE, EA 15 MIN: Performed by: INTERNAL MEDICINE

## 2020-07-04 PROCEDURE — C1726 CATH, BAL DIL, NON-VASCULAR: HCPCS | Performed by: INTERNAL MEDICINE

## 2020-07-04 PROCEDURE — 25000125 ZZHC RX 250: Performed by: NURSE ANESTHETIST, CERTIFIED REGISTERED

## 2020-07-04 PROCEDURE — 84134 ASSAY OF PREALBUMIN: CPT | Performed by: STUDENT IN AN ORGANIZED HEALTH CARE EDUCATION/TRAINING PROGRAM

## 2020-07-04 PROCEDURE — 25500064 ZZH RX 255 OP 636: Performed by: INTERNAL MEDICINE

## 2020-07-04 PROCEDURE — 40000277 XR SURGERY CARM FLUORO LESS THAN 5 MIN W STILLS: Mod: TC

## 2020-07-04 PROCEDURE — 25000128 H RX IP 250 OP 636: Performed by: STUDENT IN AN ORGANIZED HEALTH CARE EDUCATION/TRAINING PROGRAM

## 2020-07-04 PROCEDURE — 85027 COMPLETE CBC AUTOMATED: CPT | Performed by: INTERNAL MEDICINE

## 2020-07-04 PROCEDURE — 99207 ZZC CDG-CUT & PASTE-POTENTIAL IMPACT ON LEVEL: CPT | Performed by: HOSPITALIST

## 2020-07-04 PROCEDURE — 71000014 ZZH RECOVERY PHASE 1 LEVEL 2 FIRST HR: Performed by: INTERNAL MEDICINE

## 2020-07-04 PROCEDURE — 40000141 ZZH STATISTIC PERIPHERAL IV START W/O US GUIDANCE

## 2020-07-04 PROCEDURE — 37000008 ZZH ANESTHESIA TECHNICAL FEE, 1ST 30 MIN: Performed by: INTERNAL MEDICINE

## 2020-07-04 PROCEDURE — 85027 COMPLETE CBC AUTOMATED: CPT | Performed by: STUDENT IN AN ORGANIZED HEALTH CARE EDUCATION/TRAINING PROGRAM

## 2020-07-04 PROCEDURE — 36415 COLL VENOUS BLD VENIPUNCTURE: CPT | Performed by: STUDENT IN AN ORGANIZED HEALTH CARE EDUCATION/TRAINING PROGRAM

## 2020-07-04 PROCEDURE — 84520 ASSAY OF UREA NITROGEN: CPT | Performed by: INTERNAL MEDICINE

## 2020-07-04 PROCEDURE — 84100 ASSAY OF PHOSPHORUS: CPT | Performed by: STUDENT IN AN ORGANIZED HEALTH CARE EDUCATION/TRAINING PROGRAM

## 2020-07-04 PROCEDURE — C1876 STENT, NON-COA/NON-COV W/DEL: HCPCS | Performed by: INTERNAL MEDICINE

## 2020-07-04 PROCEDURE — 27210794 ZZH OR GENERAL SUPPLY STERILE: Performed by: INTERNAL MEDICINE

## 2020-07-04 PROCEDURE — 0F9G8ZZ DRAINAGE OF PANCREAS, VIA NATURAL OR ARTIFICIAL OPENING ENDOSCOPIC: ICD-10-PCS | Performed by: INTERNAL MEDICINE

## 2020-07-04 PROCEDURE — 83735 ASSAY OF MAGNESIUM: CPT | Performed by: STUDENT IN AN ORGANIZED HEALTH CARE EDUCATION/TRAINING PROGRAM

## 2020-07-04 DEVICE — STENT ESU AXIOS W/DEL SYS 15MMX10MM 10.8FR 138CM M00553650
Type: IMPLANTABLE DEVICE | Site: STOMACH | Status: NON-FUNCTIONAL
Removed: 2020-07-15

## 2020-07-04 DEVICE — STENT SOLUS BILIARY 10FRX03CM DBL PIGTAIL W/INTRO G25670
Type: IMPLANTABLE DEVICE | Site: STOMACH | Status: NON-FUNCTIONAL
Removed: 2020-07-15

## 2020-07-04 RX ORDER — IOPAMIDOL 510 MG/ML
INJECTION, SOLUTION INTRAVASCULAR PRN
Status: DISCONTINUED | OUTPATIENT
Start: 2020-07-04 | End: 2020-07-04 | Stop reason: HOSPADM

## 2020-07-04 RX ORDER — DEXAMETHASONE SODIUM PHOSPHATE 4 MG/ML
INJECTION, SOLUTION INTRA-ARTICULAR; INTRALESIONAL; INTRAMUSCULAR; INTRAVENOUS; SOFT TISSUE PRN
Status: DISCONTINUED | OUTPATIENT
Start: 2020-07-04 | End: 2020-07-04

## 2020-07-04 RX ORDER — FENTANYL CITRATE 50 UG/ML
25-50 INJECTION, SOLUTION INTRAMUSCULAR; INTRAVENOUS
Status: DISCONTINUED | OUTPATIENT
Start: 2020-07-04 | End: 2020-07-04 | Stop reason: HOSPADM

## 2020-07-04 RX ORDER — ALBUTEROL SULFATE 0.83 MG/ML
2.5 SOLUTION RESPIRATORY (INHALATION) EVERY 4 HOURS PRN
Status: DISCONTINUED | OUTPATIENT
Start: 2020-07-04 | End: 2020-07-04 | Stop reason: HOSPADM

## 2020-07-04 RX ORDER — INSULIN GLARGINE 100 [IU]/ML
14 INJECTION, SOLUTION SUBCUTANEOUS 2 TIMES DAILY
Status: ON HOLD | COMMUNITY
End: 2020-07-16

## 2020-07-04 RX ORDER — FLUMAZENIL 0.1 MG/ML
0.2 INJECTION, SOLUTION INTRAVENOUS
Status: ACTIVE | OUTPATIENT
Start: 2020-07-04 | End: 2020-07-05

## 2020-07-04 RX ORDER — NICOTINE POLACRILEX 4 MG/1
20 GUM, CHEWING ORAL 2 TIMES DAILY
COMMUNITY

## 2020-07-04 RX ORDER — SODIUM CHLORIDE, SODIUM LACTATE, POTASSIUM CHLORIDE, CALCIUM CHLORIDE 600; 310; 30; 20 MG/100ML; MG/100ML; MG/100ML; MG/100ML
INJECTION, SOLUTION INTRAVENOUS CONTINUOUS
Status: DISCONTINUED | OUTPATIENT
Start: 2020-07-04 | End: 2020-07-04 | Stop reason: HOSPADM

## 2020-07-04 RX ORDER — ONDANSETRON 2 MG/ML
INJECTION INTRAMUSCULAR; INTRAVENOUS PRN
Status: DISCONTINUED | OUTPATIENT
Start: 2020-07-04 | End: 2020-07-04

## 2020-07-04 RX ORDER — LIDOCAINE 40 MG/G
CREAM TOPICAL
Status: DISCONTINUED | OUTPATIENT
Start: 2020-07-04 | End: 2020-07-04 | Stop reason: HOSPADM

## 2020-07-04 RX ORDER — DEXTROSE MONOHYDRATE 100 MG/ML
INJECTION, SOLUTION INTRAVENOUS CONTINUOUS PRN
Status: DISCONTINUED | OUTPATIENT
Start: 2020-07-04 | End: 2020-07-16 | Stop reason: HOSPADM

## 2020-07-04 RX ORDER — HYDROMORPHONE HYDROCHLORIDE 1 MG/ML
.3-.5 INJECTION, SOLUTION INTRAMUSCULAR; INTRAVENOUS; SUBCUTANEOUS EVERY 5 MIN PRN
Status: DISCONTINUED | OUTPATIENT
Start: 2020-07-04 | End: 2020-07-04 | Stop reason: HOSPADM

## 2020-07-04 RX ORDER — LIDOCAINE HYDROCHLORIDE 20 MG/ML
INJECTION, SOLUTION INFILTRATION; PERINEURAL PRN
Status: DISCONTINUED | OUTPATIENT
Start: 2020-07-04 | End: 2020-07-04

## 2020-07-04 RX ORDER — PROPOFOL 10 MG/ML
INJECTION, EMULSION INTRAVENOUS PRN
Status: DISCONTINUED | OUTPATIENT
Start: 2020-07-04 | End: 2020-07-04

## 2020-07-04 RX ORDER — LABETALOL 20 MG/4 ML (5 MG/ML) INTRAVENOUS SYRINGE
10
Status: DISCONTINUED | OUTPATIENT
Start: 2020-07-04 | End: 2020-07-04 | Stop reason: HOSPADM

## 2020-07-04 RX ORDER — NALOXONE HYDROCHLORIDE 0.4 MG/ML
.1-.4 INJECTION, SOLUTION INTRAMUSCULAR; INTRAVENOUS; SUBCUTANEOUS
Status: ACTIVE | OUTPATIENT
Start: 2020-07-04 | End: 2020-07-05

## 2020-07-04 RX ORDER — CALCIUM CARBONATE 500 MG/1
1 TABLET, CHEWABLE ORAL 2 TIMES DAILY PRN
Status: ON HOLD | COMMUNITY
End: 2020-07-15

## 2020-07-04 RX ORDER — NALOXONE HYDROCHLORIDE 0.4 MG/ML
.1-.4 INJECTION, SOLUTION INTRAMUSCULAR; INTRAVENOUS; SUBCUTANEOUS
Status: CANCELLED | OUTPATIENT
Start: 2020-07-04 | End: 2020-07-05

## 2020-07-04 RX ORDER — FENTANYL CITRATE 50 UG/ML
INJECTION, SOLUTION INTRAMUSCULAR; INTRAVENOUS PRN
Status: DISCONTINUED | OUTPATIENT
Start: 2020-07-04 | End: 2020-07-04

## 2020-07-04 RX ORDER — ONDANSETRON 4 MG/1
4 TABLET, ORALLY DISINTEGRATING ORAL EVERY 30 MIN PRN
Status: DISCONTINUED | OUTPATIENT
Start: 2020-07-04 | End: 2020-07-04 | Stop reason: HOSPADM

## 2020-07-04 RX ORDER — HYDRALAZINE HYDROCHLORIDE 20 MG/ML
2.5-5 INJECTION INTRAMUSCULAR; INTRAVENOUS EVERY 10 MIN PRN
Status: DISCONTINUED | OUTPATIENT
Start: 2020-07-04 | End: 2020-07-04 | Stop reason: HOSPADM

## 2020-07-04 RX ORDER — SODIUM CHLORIDE, SODIUM LACTATE, POTASSIUM CHLORIDE, CALCIUM CHLORIDE 600; 310; 30; 20 MG/100ML; MG/100ML; MG/100ML; MG/100ML
INJECTION, SOLUTION INTRAVENOUS CONTINUOUS PRN
Status: DISCONTINUED | OUTPATIENT
Start: 2020-07-04 | End: 2020-07-04

## 2020-07-04 RX ORDER — ONDANSETRON 2 MG/ML
4 INJECTION INTRAMUSCULAR; INTRAVENOUS EVERY 30 MIN PRN
Status: DISCONTINUED | OUTPATIENT
Start: 2020-07-04 | End: 2020-07-04 | Stop reason: HOSPADM

## 2020-07-04 RX ADMIN — BENZOCAINE AND MENTHOL 1 LOZENGE: 15; 3.6 LOZENGE ORAL at 15:29

## 2020-07-04 RX ADMIN — PHENYLEPHRINE HYDROCHLORIDE 100 MCG: 10 INJECTION INTRAVENOUS at 11:32

## 2020-07-04 RX ADMIN — INSULIN ASPART 1 UNITS: 100 INJECTION, SOLUTION INTRAVENOUS; SUBCUTANEOUS at 00:42

## 2020-07-04 RX ADMIN — PROPOFOL 150 MG: 10 INJECTION, EMULSION INTRAVENOUS at 11:15

## 2020-07-04 RX ADMIN — PIPERACILLIN AND TAZOBACTAM 3.38 G: 3; .375 INJECTION, POWDER, FOR SOLUTION INTRAVENOUS at 03:16

## 2020-07-04 RX ADMIN — INSULIN GLARGINE 7 UNITS: 100 INJECTION, SOLUTION SUBCUTANEOUS at 20:02

## 2020-07-04 RX ADMIN — FENTANYL CITRATE 25 MCG: 50 INJECTION, SOLUTION INTRAMUSCULAR; INTRAVENOUS at 11:47

## 2020-07-04 RX ADMIN — FENTANYL CITRATE 25 MCG: 50 INJECTION, SOLUTION INTRAMUSCULAR; INTRAVENOUS at 11:33

## 2020-07-04 RX ADMIN — OMEPRAZOLE 20 MG: 20 CAPSULE, DELAYED RELEASE ORAL at 17:26

## 2020-07-04 RX ADMIN — PIPERACILLIN AND TAZOBACTAM 3.38 G: 3; .375 INJECTION, POWDER, FOR SOLUTION INTRAVENOUS at 21:58

## 2020-07-04 RX ADMIN — DEXAMETHASONE SODIUM PHOSPHATE 4 MG: 4 INJECTION, SOLUTION INTRA-ARTICULAR; INTRALESIONAL; INTRAMUSCULAR; INTRAVENOUS; SOFT TISSUE at 11:25

## 2020-07-04 RX ADMIN — BENZOCAINE AND MENTHOL 1 LOZENGE: 15; 3.6 LOZENGE ORAL at 16:25

## 2020-07-04 RX ADMIN — PANCRELIPASE 24000 UNITS: 24000; 76000; 120000 CAPSULE, DELAYED RELEASE PELLETS ORAL at 17:27

## 2020-07-04 RX ADMIN — ROCURONIUM BROMIDE 50 MG: 10 INJECTION INTRAVENOUS at 11:15

## 2020-07-04 RX ADMIN — ONDANSETRON 4 MG: 2 INJECTION INTRAMUSCULAR; INTRAVENOUS at 11:25

## 2020-07-04 RX ADMIN — PHENYLEPHRINE HYDROCHLORIDE 100 MCG: 10 INJECTION INTRAVENOUS at 11:41

## 2020-07-04 RX ADMIN — DOCUSATE SODIUM AND SENNOSIDES 1 TABLET: 8.6; 5 TABLET ORAL at 20:02

## 2020-07-04 RX ADMIN — PHENYLEPHRINE HYDROCHLORIDE 100 MCG: 10 INJECTION INTRAVENOUS at 11:30

## 2020-07-04 RX ADMIN — LIDOCAINE HYDROCHLORIDE 100 MG: 20 INJECTION, SOLUTION INFILTRATION; PERINEURAL at 11:15

## 2020-07-04 RX ADMIN — INSULIN ASPART 2 UNITS: 100 INJECTION, SOLUTION INTRAVENOUS; SUBCUTANEOUS at 20:02

## 2020-07-04 RX ADMIN — FENTANYL CITRATE 50 MCG: 50 INJECTION, SOLUTION INTRAMUSCULAR; INTRAVENOUS at 11:23

## 2020-07-04 RX ADMIN — PIPERACILLIN AND TAZOBACTAM 3.38 G: 3; .375 INJECTION, POWDER, FOR SOLUTION INTRAVENOUS at 09:56

## 2020-07-04 RX ADMIN — PIPERACILLIN AND TAZOBACTAM 3.38 G: 3; .375 INJECTION, POWDER, FOR SOLUTION INTRAVENOUS at 16:22

## 2020-07-04 RX ADMIN — SODIUM CHLORIDE, POTASSIUM CHLORIDE, SODIUM LACTATE AND CALCIUM CHLORIDE: 600; 310; 30; 20 INJECTION, SOLUTION INTRAVENOUS at 11:08

## 2020-07-04 RX ADMIN — MULTIVITAMIN 15 ML: LIQUID ORAL at 18:25

## 2020-07-04 RX ADMIN — SUGAMMADEX 200 MG: 100 INJECTION, SOLUTION INTRAVENOUS at 12:19

## 2020-07-04 RX ADMIN — INSULIN ASPART 2 UNITS: 100 INJECTION, SOLUTION INTRAVENOUS; SUBCUTANEOUS at 17:26

## 2020-07-04 ASSESSMENT — ACTIVITIES OF DAILY LIVING (ADL)
ADLS_ACUITY_SCORE: 17

## 2020-07-04 ASSESSMENT — PAIN DESCRIPTION - DESCRIPTORS: DESCRIPTORS: CRAMPING

## 2020-07-04 NOTE — ANESTHESIA PREPROCEDURE EVALUATION
MENTAL HEALTH PSYCHIATRIC MEDICATION MANAGEMENT     Medical records were reviewed for 5 minutes to aid in diagnostic and treatment plan.    Patient has a history of mental health treatment for:   F31.30 Bipolar depression (CMS/HCC)  (primary encounter diagnosis)  F41.1 DAHLIA (generalized anxiety disorder)  F90.0 ADHD (attention deficit hyperactivity disorder), inattentive type    Additonally patient has the following medical problems:  Past Medical History:   Diagnosis Date   • Bipolar depression (CMS/HCC)    • Bipolar disorder (CMS/HCC)    • CKD (chronic kidney disease) stage 3, GFR 30-59 ml/min    • ED (erectile dysfunction)    • Edentulism    • Hearing loss    • Tobacco dependence    • Type II or unspecified type diabetes mellitus without mention of complication, not stated as uncontrolled    • Unspecified essential hypertension      Past Surgical History:   Procedure Laterality Date   • Colonoscopy diagnostic  10/13/2009       current medications:  Current Outpatient Prescriptions   Medication Sig Dispense Refill   • [START ON 5/15/2018] dextroamphetamine (DEXTROSTAT) 10 MG tablet Take 1 tablet by mouth 2 times daily. 60 tablet 0   • [START ON 6/14/2018] dextroamphetamine (DEXTROSTAT) 10 MG tablet Take 1 tablet by mouth 2 times daily. 60 tablet 0   • potassium chloride 10 MEQ CR tablet Take 2 tablets by mouth daily. 180 tablet 0   • metformin (GLUCOPHAGE-XR) 500 MG 24 hr tablet TAKE TWO TABLETS BY MOUTH ONCE DAILY WITH BREAKFAST 60 tablet 5   • magnesium chloride (MAG DELAY) 535 (64 Mg) MG Tab CR Take 1 tablet by mouth 2 times daily. 60 tablet 0   • potassium chloride 10 MEQ CR tablet Take 2 tablets by mouth daily. 90 tablet 1   • amLODIPine (NORVASC) 10 MG tablet Take 1 tablet by mouth daily. Must see Dr and get fasting labs 90 tablet 0   • omeprazole (PRILOSEC) 20 MG capsule TAKE ONE CAPSULE BY MOUTH TWICE DAILY 120 capsule 0   • lamoTRIgine (LAMICTAL) 100 MG tablet Take 1 tablet by mouth daily. Indications:  Anesthesia Pre-Procedure Evaluation    Patient: Norberto Almonte   MRN:     [id number] Gender:   male   Age:    64 year old :      1955        Preoperative Diagnosis: Necrotizing pancreatitis [K85.91]   Procedure(s):  ESOPHAGOGASTRODUODENOSCOPY, WITH ENDOSCOPIC ULTRASOUND, WITH ENDOSCOPIC EXCISION OF NECROTIC PANCREATIC TISSUE/cystgastrostomy with NJ     LABS:  CBC:   Lab Results   Component Value Date    WBC 9.8 2020    WBC 5.5 2020    HGB 10.7 (L) 2020    HGB 8.7 (L) 2020    HCT 35.9 (L) 2020    HCT 28.6 (L) 2020     2020     2020     BMP:   Lab Results   Component Value Date     2020     2020    POTASSIUM 4.1 2020    POTASSIUM 3.8 2020    CHLORIDE 105 2020    CHLORIDE 103 2020    CO2 24 2020    CO2 27 2020    BUN 4 (L) 2020    BUN 5 (L) 2020    CR 0.70 2020    CR 0.76 2020     (H) 2020     (H) 2020     COAGS:   Lab Results   Component Value Date    INR 1.18 (H) 2020     POC:   Lab Results   Component Value Date     (H) 2020     OTHER:   Lab Results   Component Value Date    A1C 6.3 (H) 2020    BARAK 8.8 2020    PHOS 2.8 2020    MAG 2.0 2020    ALBUMIN 2.2 (L) 2020    PROTTOTAL 7.7 2020    ALT 25 2020    AST 32 2020    ALKPHOS 471 (H) 2020    BILITOTAL 1.2 2020    .0 (H) 2020        Preop Vitals    BP Readings from Last 3 Encounters:   20 125/58    Pulse Readings from Last 3 Encounters:   20 72      Resp Readings from Last 3 Encounters:   20 16    SpO2 Readings from Last 3 Encounters:   20 96%      Temp Readings from Last 1 Encounters:   20 36.7  C (98.1  F) (Oral)    Ht Readings from Last 1 Encounters:   No data found for Ht      Wt Readings from Last 1 Encounters:   20 77.6 kg (171 lb)    There is no height or  Manic-Depression 90 tablet 2   • venlafaxine XR (EFFEXOR XR) 75 MG 24 hr capsule Take 3 capsules by mouth daily. 270 capsule 5   • atorvastatin (LIPITOR) 40 MG tablet Take 1 tablet by mouth nightly. 30 tablet 5   • benazepril-hydrochlorthiazide (LOTENSIN HCT) 20-25 MG per tablet TAKE ONE TABLET BY MOUTH ONCE DAILY 90 tablet 1   • sucralfate (CARAFATE) 1 G tablet Take 1 tablet by mouth 2 times daily. 60 tablet 3   • Multiple Vitamins-Minerals (DAILY MULTI 50+) Tab Take 1 tablet by mouth daily.     • sildenafil (VIAGRA) 50 MG tablet Take 50 mg by mouth. SEE INSTRUCTIONS.  ONE HOUR BEFORE SEXUAL ACTIVITY.        No current facility-administered medications for this visit.        primary care doctor:  Carlos Grace MD    Allergies:  ALLERGIES:   Allergen Reactions   • Cefaclor HIVES   • Nicoderm [Nicotine] RASH       62 year old male with a history of   F31.30 Bipolar depression (CMS/HCC)  (primary encounter diagnosis)  F41.1 DAHLIA (generalized anxiety disorder)  F90.0 ADHD (attention deficit hyperactivity disorder), inattentive type   who presents for follow up appointment    Today patient reports over the last month since last appointment:  Stressors: (see impression below)  Energy: Improving  Focus: Improving  Appetite: good  Suicidal/Homicidal Ideation: denies  Auditory/Visual Hallucinations: denies  Med Compliant: yes  Pain: denies  Medication Side Effects: none  Mood: \" Improving \"  Anxiety: Stable  Sleep/nightmares: Improving    MENTAL STATUS EXAM:  Appearance: appropriate dress, appears stated age  Speech: normal rate and tone, able to repeat phrases  Mood: \"Improving\"  Affect: mood congruent  Thought Process: Linear, logical, no flight of ideas, spontaneous thoughts  Associations: intact; no loose /tangential/ circumstantial associations  Thought content: no suicidal thoughts, homicidal thoughts or psychotic symptoms such as auditory/visual hallucinations, paranoia, delusions  Insight and judgement:  weight on file to calculate BMI.     LDA:  Peripheral IV 07/02/20 Left Upper forearm (Active)   Site Assessment WDL 07/04/20 0758   Line Status Saline locked 07/04/20 0758   Phlebitis Scale 0-->no symptoms 07/04/20 0758   Infiltration Scale 0 07/04/20 0758   Infiltration Site Treatment Method  None 07/04/20 0758   Extravasation? No 07/04/20 0758   Number of days: 2       ETT (Active)   Number of days: 0        No past medical history on file.   No past surgical history on file.   No Known Allergies     Anesthesia Evaluation     .             ROS/MED HX    ENT/Pulmonary:       Neurologic: Comment: Acute toxic encephalopathy at outside hospital admission    (+)delerium resolved Yes,     Cardiovascular:     (+) hypertension----. : . . . :. .       METS/Exercise Tolerance:     Hematologic: Comments: Superior mesenterivc vein thrombosis, on coumadin    INR 1.18    Hgb 8.3    (+) History of blood clots pt is anticoagulated, Anemia, -      Musculoskeletal:         GI/Hepatic:     (+) Other GI/Hepatic acute necrotizing pancreatitis      Renal/Genitourinary: Comment: HARRIS at outside hospital, normal labs on admission yesterday    (+) chronic renal disease, Pt does not require dialysis,       Endo:         Psychiatric:         Infectious Disease:         Malignancy:         Other:                         PHYSICAL EXAM:   Mental Status/Neuro: A/A/O   Airway: Facies: Feasible  Mallampati: II  Mouth/Opening: Full  TM distance: > 6 cm   Respiratory: Auscultation: CTAB     Resp. Rate: Normal     Resp. Effort: Normal      CV: Rhythm: Regular  Rate: Age appropriate  Heart: Normal Sounds  Edema: None   Comments:                      Assessment:   ASA SCORE: 3 emergent   H&P: History and physical reviewed and following examination; no interval change.   Smoking Status:  Non-Smoker/Unknown   NPO Status: NPO Appropriate     Plan:   Anes. Type:  General   Pre-Medication: None   Induction:  IV (RSI)   Airway: ETT; Oral   Access/Monitoring:  PIV   Maintenance: Balanced     Postop Plan:   Postop Pain: Opioids  Postop Sedation/Airway: Not planned     PONV Management:   Adult Risk Factors:, Non-Smoker, Postop Opioids   Prevention: Ondansetron, Dexamethasone     CONSENT: Direct conversation   Plan and risks discussed with: Patient   Blood Products: Consented (ALL Blood Products)       Comments for Plan/Consent:  Plan:  GA with ETT, routine monitors, RSI, T&S    MD Tonny Conn MD   good  Orientation: alert and oriented to person, place, time, and situation.  Behavior: calm, cooperative, Good eye contact  Motor: No psychomotor agitation or retardation.   Memory: fair, good fund of knowledge  Concentration: fair  Abstraction: able to abstract?  Neuro: normal gait, no tremor or abnormal movements noted, no facial asymmetry, normal speech, gross motor intact without atrophy, normal muscle strength and tone  AIMS score = 0  Pt feels that current treatment is: \"okay\"      Pt denies:  fever  weakness  m. stiffness or pain or spasms   tremors  fatigue  high Blood pressure  loss of consciousness  seizure disorder  memory loss  chest pain  tachycardia  shortness of breath  syncope  blurry vision  weight change  headache or migraines  nausea or vomiting  gastrointestinal problems or discomfort  genitourinary problems or discomfort  pain    LABS:  Hemoglobin A1C (%)   Date Value   04/10/2018 5.4     No components found for: GLU0T  CHOLESTEROL (mg/dL)   Date Value   04/10/2018 265 (H)     HDL (mg/dL)   Date Value   04/10/2018 56     CHOL/HDL (no units)   Date Value   04/10/2018 4.7 (H)     TRIGLYCERIDE (mg/dL)   Date Value   04/10/2018 264 (H)     CALCULATED LDL (mg/dL)   Date Value   04/10/2018 156 (H)     TSH (mcUnits/mL)   Date Value   10/02/2017 1.831     Results for orders placed or performed during the hospital encounter of 11/22/17   Electrocardiogram 12-Lead   Result Value Ref Range    Ventricular Rate EKG/Min (BPM) 71     Atrial Rate (BPM) 71     SC-Interval (MSEC) 174     QRS-Interval (MSEC) 92     QT-Interval (MSEC) 408     QTc 443     P Axis (Degrees) 77     R Axis (Degrees) 79     T Axis (Degrees) 81     REPORT TEXT       Normal sinus rhythm  Normal ECG  Confirmed by KIRIT YOUNG MD ([id number]) on 11/26/2017 4:38:28 PM       WBC (K/mcL)   Date Value   04/10/2018 7.3     RBC (mil/mcL)   Date Value   04/10/2018 4.09 (L)     HCT (%)   Date Value   04/10/2018 39.4     HGB (g/dL)   Date Value    04/10/2018 13.8     PLT (K/mcL)   Date Value   04/10/2018 277   02/19/2013 234     Sodium (mmol/L)   Date Value   04/17/2018 140     Potassium (mmol/L)   Date Value   04/17/2018 3.6     Chloride (mmol/L)   Date Value   04/17/2018 99     Glucose (mg/dL)   Date Value   04/17/2018 203 (H)     CALCIUM (mg/dL)   Date Value   04/17/2018 8.9     Carbon Dioxide (mmol/L)   Date Value   04/17/2018 34 (H)     BUN (mg/dL)   Date Value   04/17/2018 28 (H)     Creatinine (mg/dL)   Date Value   04/17/2018 1.23 (H)     AST/SGOT (Units/L)   Date Value   04/10/2018 13     ALT/SGPT (Units/L)   Date Value   04/10/2018 20     No results found for: GGTP  ALK PHOSPHATASE (Units/L)   Date Value   04/10/2018 108     TOTAL BILIRUBIN (mg/dL)   Date Value   04/10/2018 0.4     Albumin (g/dL)   Date Value   04/10/2018 3.8     No results found for: TP  GFR Estimate, Non  (no units)   Date Value   04/17/2018 63     GFR Estimate,  (no units)   Date Value   04/17/2018 72        Comprehensive Past/Family/Social history which was performed during initial evaluation was reexamined and reviewed with patient. For further details, please refer to my initial evaluation note in this chart as well as below for updates.    VS:   There were no vitals filed for this visit.      There were no vitals filed for this visit.    Living with: spouse and daughter  Family Support: wife and daughter  Hobbies: take the dog to park, plays golf     FAMILY HISTORY  drugs and alcohol:  denies     mental illness: Denies     Suicide: Denies     Sudden cardiac death: denies     PSYCHIATRIC HISTORY  Inpatient: Denies  Outpatient: Dr. Do, who retired in December 2016  Previous diagnoses:  bipolar depression, ADD  h/o suicide attempts:  denies  Weapons:  denies  Past treatment for alcohol/drugs: Denies  Legal History: Denies  Trauma/Abuse: Denies     IMPRESSION:   62 year old, male with a history of   F31.30 Bipolar depression (CMS/[facility])   (primary encounter diagnosis)  F41.1 DAHLIA (generalized anxiety disorder)  F90.0 ADHD (attention deficit hyperactivity disorder), inattentive type   who presents for follow up.   Stressors: Improving delon, depression, anxiety, and inattention    Patient with improving bipolar disorder and symptoms of delon such as distractibility, indiscretion, grandiose thoughts, flight of ideas, increased activity, pressured speech, talkativeness, and insomnia.     Patient with improving depression and symptoms of Fatigue, Distractibility, Anhedonia, Guilt, and insomnia. Pt denies suicidal thoughts.     Patient with unstable generalized anxiety disorder and symptoms of worries, muscle tension, irritability, fatigue, and insomnia.       Patient with improving ADD and symptoms of inattention (carelessness, poorly sustaining focus, distractibility, poor organization, forgetfulness, poor attention to detail, distracted).     This patient is not suicidal, nor is pt homicidal. Pt is not gravely disabled. Inpatient admission is not appropriate at this time.     Prior psychiatric medications include:  Paxil (inefficacious)      RECOMMENDATIONS  -Meds:   Venlafaxine 225 mg daily  Dextroamphetamine 10 mg p.o. b.i.d.   Lamictal 100mg p.o. q.h.s.  New FDA warning explained regarding immune system reaction, patient would like to stay on Lamictal and watch out for symptoms     -Individual Therapy: none  Patient is not currently engaged in individual psychotherapy (despite offering referral, but may reconsider)     Substance use:  Tobacco- denies  Alcohol- social; denies withdrawal symptoms   Drugs- denies     Laboratory testing:   reviewed CBC, CMP, A1c, lipid profile, TSH, EKG  next pending 3/19     Mtp due 11/22/17     Patient was informed about possible symptoms of delon such as distractibility, indiscretion, fast speech, decreased sleep for days, increased energy, and flight of ideas. Patient was informed that this could be a possible side  effect of antidepressant and agrees to go to the emergency room/call 911/call crisis hotline/call a family member if he has any of these symptoms. Patient was also informed about increased risk of suicidality, depression, severe skin reactions, seizures, low sodium, SIADH, hepatotoxicity, priapism, extrapyramidal symptoms, and withdrawal symptoms with abrupt discontinuation of antidepressants.     Patient understand that if pt has any symptoms of palpitations or arrhythmia or chest pain, to go to the nearest emergency room for evaluation and understands the risks associated with stimulant use such as serious cardiovascular adverse events and sudden cardiac death. patient feels benefits outweigh risks and agrees to treatment and to contact Dr for any signs and symptoms as such. Patient also educated about serious side effects such as psychosis, delon, aggressive behavior, stroke, heart attack, cardiomyopathy from long-term use, seizures, severe dermatological reactions, dependency issues, priapism, peripheral vascular apathy, rhabdomyolysis, and withdrawal symptoms is suddenly discontinued.     Patient has been informed of side effects such as, but not limited to, dizziness, headaches, somnolence, insomnia, suicidal thoughts, severe rash, severe dermatological reaction, angioedema, severe life threatening hypersensitivity reaction, blood dyscrasias, status epilepticus, withdrawal seizures if abruptly discontinued, hepatic failure, suicidality, depression exacerbation, hemophagocytic lymphohistiocytosis (HLH)- [Fever and rash; Enlarged spleen; Cytopenias; Elevated levels of triglycerides or low blood levels of fibrinogen; High levels of blood ferritin; Hemophagocytosis identified through bone marrow, spleen, or lymph node biopsy; Decreased or absent natural killer cell activity; and Elevated blood levels of CD25 showing prolonged immune cell activation] with Lamictal. patient agrees to treatment and to contact  for  any signs and symptoms as such.       d    Orders Placed This Encounter   • dextroamphetamine (DEXTROSTAT) 10 MG tablet   • dextroamphetamine (DEXTROSTAT) 10 MG tablet       -[place] Reviewed 5/4/2018      Follow up: 8 weeks    advised to call this MD during clinic hours with any concerns prior to next appt.  Emergency Room/Urgent Care with Suicidal/Homicidal Ideation or worsening signs/symptoms.  Suicide hotline number provided.  Patient is in agreement with treatment plan.    Time spent with patient: 15 minutes     Suicide risk remains low; Violence risk remains low; No changes from last risk assessment. Please refer to initial evaluation/progress notes. Safety measures and plan were discussed.    Writer discussed with patient at length about patient's diagnosis, indications of treatment, risks and benefits of the treatment, medication side effects (low, moderate, and high risk). Patient aware of alternative treatments available,  higher levels of care, and options of treatment vs non treatment as discussed in initial evaluation. Patient also educated about the potential benefits of the medications--bearing the risk and benefit in mind patient has agreed to take the aforementioned medications. Patient educated about the risks of death and severe adverse side effects in case of use of any psychotropic medications along with any potential amount of alcohol and patient verbalizes understanding of this risk. Patient counseled on need for medication compliance. Patient counseled on the signs and symptoms of serotonin syndrome such as hyperthermia, autonomic instability, rigidity, myoclonus, encephalopathy, and diaphoresis and to dial 911/ER if occur. Patient also verbalizes understanding of probable consequences of not receiving or not being compliant with the proposed treatment/medications (including but not limited to leading to a hospital visit, ER (Emergency Room) visit or death). Patient  educated that the full benefit of the medication may not be seen if the patient is not taking it as prescribed. Patient informed that the duration of treatment is dependent on treatment response and that the desired outcome is complete remission from symptoms--patient also educated that in certain cases wherein symptom episodes recur for a given diagnosis and then remit, that life-long medication treatment is still recommended in this maintenance phase to prevent symptoms from recurring.   Pt understand Pt has the right to withdraw consent to take the prescribed medication at any time. Writer reinforced with the patient the need for compliance with care by reinforcing the importance of keeping regular appointments in order to accomplish therapy goals/safely monitor medications. Writer also emphasized the need for giving at least 24-hour cancellation notice explaining that another patient mat be able to benefit from the vacant appointment time slot. Writer also assisted the patient in verbalizing a plan to maximize committment to treatment/ scheduled appointments by assessing the best time/day of week for appointments, mode of transportation, arranging for  if applicable, and assessing motivation for treatment. Pt was also given an opportunity to ask questions.    Patient has capacity to make decisions and voiced understanding and consents to treatment. Medication reconciliation was completed within the realm of my speciality and pertaining to this encounter. I have obtained and reviewed medications and allergy information with the patient. Patient was educated on the importance of maintaining and sharing this information with all healthcare providers.     Nik Paris MD

## 2020-07-04 NOTE — PROGRESS NOTES
Admitted/transferred from PACU  2 RN skin assessment completed by BUNNY MONREAL RN and Kulwinder Cantu   Skin assessment finding: no new findings- L heel wound from previous encounter. Present before sending patient to OR today   Interventions/actions: Continue to encourage ambulation and off-loading of heel  Will continue to monitor.

## 2020-07-04 NOTE — PROVIDER NOTIFICATION
Notified MD at 0336 AM regarding lab results.      Spoke with: Mainor Urbina 2 on-call    Orders were not obtained.    Comments: Pre-op lab results came back at 00.  Hgb 8.7 (was 10.2).  MD called with lab change.  Per MD, get a recent VS, and OK to wait till 0600 to recheck labs.  Recent VS:  /58 (BP Location: Left leg)   Pulse 72   Temp 98.1  F (36.7  C) (Oral)   Resp 16   Wt 77.6 kg (171 lb)   SpO2 96%     Cont to monitor pt.  Cont with POC.

## 2020-07-04 NOTE — PROGRESS NOTES
Nutrition Brief - consulted for Registered Dietitian to Order TF per Medical Nutrition Therapy Guidelines.      --Please see RD full nutrition assessment on 7/3 for details.     FEN:  K+: 4.1, Mg++:2.0, Phos: 2.8 ( 7/3)  CRP: 100 (H), Albumin: 2.2, BUN: 4 -> low values are likely reflective of increased needs with suboptimal intakes. Given elevated CRP and significantly depressed negative phase Proteins, pt would be good candidate for immune-modulating and anti-inflammatory TF therapy post op status.       Interventions:  1. Enteral nutrition support:  --Dosing wt: 75 kg (actual, lowest recent wt on 6/26)  --Access: NJ tube placed 7/4    --Ordered Impact peptide 1.5 (semi-elemental, fiber free) @ 10 mL/hr and advance by 10 mL Q8H as tolerated to goal rate 60 ml/hr (DO NOT start or advance TF unless K+/Mg++ >/= nrml and phos >1.9.  Pt is at refeeding risk).     -- Standard free water flushes @ 60 mL Q4H for FT patency (does not provide full hydration).    --Impact Peptide @ goal 60 ml/hr (1440 ml/day) to provide 2160 kcals (29 kcal/kg/day), 135 gm PRO (1.8 g/kg/day), 1109 ml free H2O, 92 gm Fat (50% from MCTs), 202 gm CHO and no Fiber daily.     2. Monitor GI/stool for need for Enzyme and soluble fiber with TFs    Recommendation:  1. Ongoing Lyte Monitor with TF start and advancement , replace as needed   2. Monitor BG and adjust insulin with TF start and advancement    Forbasilio Hardy RD/LOLLY  Weekend Pager [phone]

## 2020-07-04 NOTE — ANESTHESIA CARE TRANSFER NOTE
Patient: Norberto Almonte    Procedure(s):  Endoscopic ultrasound with cyst gastrostomy, dilation and stent placement, nasojejunal feeding tube placement  Insert tube nasojejunostomy    Diagnosis: Necrotizing pancreatitis [K85.91]  Diagnosis Additional Information: No value filed.    Anesthesia Type:   General     Note:  Airway :Nasal Cannula  Patient transferred to:PACU  Comments: Pt. Pink and breathing spontaneously.  Vitals stable.  Report given to oncoming nurse.  Transfer of care occurred. Handoff Report: Identifed the Patient, Identified the Reponsible Provider, Reviewed the pertinent medical history, Discussed the surgical course, Reviewed Intra-OP anesthesia mangement and issues during anesthesia, Set expectations for post-procedure period and Allowed opportunity for questions and acknowledgement of understanding      Vitals: (Last set prior to Anesthesia Care Transfer)    CRNA VITALS  7/4/2020 1156 - 7/4/2020 1233      7/4/2020             Pulse:  75    SpO2:  100 %    Resp Rate (observed):  (!) 2                Electronically Signed By: KIM Zee CRNA  July 4, 2020  12:33 PM

## 2020-07-04 NOTE — PLAN OF CARE
Patient denies pain, no nausea, tolerating full liquid diet, PIV patent. Pt is voiding and ambulating without difficulty, reports positive flatus,  last BM was yesterday.  Last . NPO at midnight for EGD tomorrow, pre op shower done. Pt will take another shower in the morning. Pt is independent with cares.     Update: Pt needs stool sample, supplies at bedside by the computer.

## 2020-07-04 NOTE — ANESTHESIA POSTPROCEDURE EVALUATION
Anesthesia POST Procedure Evaluation    Patient: Norberto Almonte   MRN:     [id number] Gender:   male   Age:    64 year old :      1955        Preoperative Diagnosis: Necrotizing pancreatitis [K85.91]   Procedure(s):  Endoscopic ultrasound with cyst gastrostomy, dilation and stent placement, nasojejunal feeding tube placement  Insert tube nasojejunostomy   Postop Comments: No value filed.     Anesthesia Type: General       Disposition: Admission   Postop Pain Control: Uneventful            Sign Out: Well controlled pain   PONV: No   Neuro/Psych: Uneventful            Sign Out: Acceptable/Baseline neuro status   Airway/Respiratory: Uneventful            Sign Out: Acceptable/Baseline resp. status   CV/Hemodynamics: Uneventful            Sign Out: Acceptable CV status   Other NRE: NONE   DID A NON-ROUTINE EVENT OCCUR? No         Last Anesthesia Record Vitals:  CRNA VITALS  2020 1156 - 2020 1250      2020             Pulse:  75    SpO2:  100 %    Resp Rate (observed):  (!) 2          Last PACU Vitals:  Vitals Value Taken Time   /67 2020 12:34 PM   Temp 2.7  C (36.9  F) 2020 12:30 PM   Pulse 77 2020 12:34 PM   Resp 22 2020 12:30 PM   SpO2 96 % 2020 12:48 PM   Temp src     NIBP     Pulse     SpO2     Resp     Temp     Ht Rate     Temp 2     Vitals shown include unvalidated device data.      Electronically Signed By: Tonny Aviles MD, 2020, 12:50 PM

## 2020-07-04 NOTE — OP NOTE
Upper EUS  07/04/2020 10:37 AM  [facility], [address] [place] [address] [phone]     Endoscopy Department   _______________________________________________________________________________   Patient Name: Norberto Almonte          Procedure Date: 7/4/2020 10:37 AM   MRN: [id number]                       Account Number: [id number]   YOB: 1955             Admit Type: Inpatient   Age: 64                               Room: OR   Gender: Male                          Note Status: Finalized   Attending MD: César Almaguer MD       Pause for the Cause: time out performed   Total Sedation Time:                     _______________________________________________________________________________       Procedure:           Upper EUS   Indications:         Walled off necrosis; 64 year old male with a history of                        idiopathic pancreatitis and recent hospital stay in                        April complicated by ileus, SMV thrombosis and splenic                        vein thrombosis. Initial acute pancreatitis at [facility] where ultimately he was intubated then discharged                        on 5/21. Post discharge patient was having issues with                        diarrhea. He presented to [facility] again on 6/26 with                        diarrhea, denied fever or chills but had abdominal pain                        and vomiting. Large walled off necrosis on CT with air                        bubbles in the collection but without signs of sepsis                        suggestive of spontaneous fistula.   Providers:           César Almaguer MD   Referring MD:           Requesting Provider: Brian Barrow   Medicines:           General Anesthesia, Levaquin 500 mg IV   Complications:       No immediate complications. Estimated blood loss:                        Minimal.    _______________________________________________________________________________   Procedure:           Pre-Anesthesia Assessment:                        - Prior to the procedure, a History and Physical was                        performed, and patient medications and allergies were                        reviewed. The patient is competent. The risks and                        benefits of the procedure and the sedation options and                        risks were discussed with the patient. All questions                        were answered and informed consent was obtained. Patient                        identification and proposed procedure were verified by                        the physician, the nurse, the anesthesiologist and the                        anesthetist in the procedure room. Mental Status                        Examination: alert and oriented. Airway Examination:                        normal oropharyngeal airway and neck mobility.                        Respiratory Examination: clear to auscultation. CV                        Examination: normal. Prophylactic Antibiotics: The                        patient requires prophylactic antibiotics pancreatic                        necrosectomy. Prior Anticoagulants: The patient has                        taken no previous anticoagulant or antiplatelet agents.                        ASA Grade Assessment: III - A patient with severe                        systemic disease. After reviewing the risks and                        benefits, the patient was deemed in satisfactory                        condition to undergo the procedure. The anesthesia plan                        was to use general anesthesia. Immediately prior to                        administration of medications, the patient was                        re-assessed for adequacy to receive sedatives. The heart                        rate, respiratory rate, oxygen saturations, blood                         pressure, adequacy of pulmonary ventilation, and                        response to care were monitored throughout the                        procedure. The physical status of the patient was                        re-assessed after the procedure.                        After obtaining informed consent, the endoscope was                        passed under direct vision. Throughout the procedure,                        the patient's blood pressure, pulse, and oxygen                        saturations were monitored continuously. The was                        introduced through the mouth, and advanced to the antrum                        of the stomach. The upper EUS was accomplished without                        difficulty. The patient tolerated the procedure well.                                                                                     Findings:        ENDOSONOGRAPHIC FINDING: :        A large collection with mostly anechoic fluid with associated        heterogenous debris and air artifact suggestive of walled-off necrosis        along the posterior wall of the stomach. The lesion measured 94 mm by 65        mm in cross-sectional diameter. The outer wall of the lesion was thin.        The wall of the necrotic collection was interrogated utilizing color        Doppler imaging to identify interposed vessels. The necrotic collection        was punctured transgastrically under endosonographic guidance using a 15        mm Hot Axios delivery system. A 0.025 inch x 450 cm straight Visiglide        wire was inserted into the cavity under fluoroscopic guidance and        allowed to coil several times. A 15 mm by 10 mm Axios stent was placed        across the cystgastrostomy cavity. The stent was in good position. A        large amount of prurulent fluid immediately drained out. The stent was        post-dilated to 15 mm with a 12-15 mm Elation balloon. A 10 Fr by 3 cm        double pigtailed Solus  stent was placed through the Axios stent.        Subsequent fluoroscopy imaging showed filling of the cyst cavity with        air. There was no suggestion of intraperitoneal/subdiaphragmatic air.        A pediatric gastroscope was then advanced through the left nare to the        proximal jejunum. A straight Glidewire was then advanced through the        channel into the proximal jejunum. The pediatric gastroscope was        exchanged over the wire. A 12 Fr Cor radha nasojejunal tube was then        lubricated and advanced over the wire fluroscopically into the proximal        jejunum. The tube was then secured to the nose at 95 cm with a bridal.                                                                                     Impression:          - Large walled off necrosis posterior to the stomach                        with some air artifact within the collection. Fistula                        not visualized.                        - Transgastric drainage with 15 mm Axios performed. Post                        dilated to 15 mm and 10 Fr x 3 cm DPT Solus stent placed                        across Axios stent                        - 12 Fr NJ placed and secured with a nasal bridal                        - No specimens collected.   Recommendation:      - Return patient to [facility] for ongoing care.                        - NJ tube may be used immediately                        - Would place on IV Zosyn for next 5 days after drainage                        today for prophylaxis                        - Avoid anticoagulation for at least 72 hours. Would                        hold off on restarting anticoagulation for SMV/splenic                        thrombosis for now until repeat imaging in a week as                        this wasn't appreciated on his last CT.                        - Repeat CT later this week with likely repeat                        necrosectomy thereafter                        -  Panc-bili team to continue following                                                                                       César Almaguer MD

## 2020-07-04 NOTE — PHARMACY-CONSULT NOTE
Pharmacy Tube Feeding Consult    Medication reviewed for administration by feeding tube and for potential food/drug interactions.    Recommendation: No changes are needed at this time. Per RN, patient is able to swallow tabs/caps. Do not crush/open omeprazole CR caps.      Pharmacy will continue to follow as new medications are ordered.    Bria Sherman, PharmD

## 2020-07-04 NOTE — PLAN OF CARE
Transfer from [facility] for surgical evaluation by GI for the pseudocyst on 07/02/20  POD # 0 Endoscopic ultrasound with cyst gastrostomy, dilation and stent placement, nasojejunal feeding tube placement      Pain: denies. Throat discomfort managed with cepacol lozenges  Nausea: denies   Lab: BS Q4- no coverage needed this shift   BP (!) 146/72 (BP Location: Left arm)   Pulse 87   Temp 97.3  F (36.3  C) (Oral)   Resp 20   Wt 77.6 kg (171 lb)   SpO2 96%      Output: Voiding, not saving   Diet: Clears   Activity: UAL   Bowel Function: Bowel sounds heard in all quadrants, passing flatus, no bm this shift   Lines: L PIV, infusing TKO in between abx, dressing CDI   Tubes: NJ in place, tube feeds started at 10 ml/hr   Skin: L posterior heel resolving wound  Plan: Continue to monitor pain, nausea, VS, output, and bowel function. Per GI note- continue with IV abx and hold off on anti-coagulation at this time.

## 2020-07-04 NOTE — PLAN OF CARE
AVSS.  Pt resting well overnight.  Denies pain, denies nausea.  NPO for OR this morning.  00 Hgb=8.7 (was 10.2 earlier) - MD notified, will recheck labs this AM.    PIV@TKO for ATBX.  BS checks Q4hr - covered per sliding scale.  Abd distended.  Pt up independently in room.  Voiding, not saving.    Stool sample still needed, pt aware, specimen cup in room.    Will have pt do another pre-op shower at 0600.  PACU called and said case would be at 0800, have the pt ready by 0700.  Will cont to monitor pt closely.

## 2020-07-04 NOTE — PROGRESS NOTES
[facility], [place]    Progress Note - Mainor 2 Service        Date of Admission:  7/2/2020    Assessment & Plan   Alexandr Almonte is a 65 yo male with a history of HTN and complicated necrotizing pancreatitis with recent CT findings of a complex cystic structure and air within a worsening WON concerning for infection. He is presenting as a transfer from [facility] for surgical evaluation by GI for the pseudocyst on 07/02/20.     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 Most recent OSH CT imaging (6/28) shows progressive change with a large complex cystic structure in the mid abdomen and a worsening pancreatic pseudocyst with evidence of air concerning for infection. He is currently on Zosyn for suspected infection of the pseudocyst. GI evaluated the patient and suspects that the gas in the collection is due to fistula and that the pseudocyst is causing gastric outlet obstruction. They plan for EUS transgastric drainage tomorrow with NJ placement.   - GI Panc/Bili consult, appreciate recs  - Proceed with EUS-guided cystogastrostomy with NJ tube placement today (7/4)  - Dietician consulted, appreciate recs   - Creon 2-3 capsules of 24,000 plus PRN 2 capsules with snacks TID with meals   - Fecal elastase: pending  - Antibiotics:              - Zosyn 3.375g q6hrs  - Omeprazole 20 mg PO BID    # SMV &  "Splenic vein thrombosis  2/2 The patient was initiated on warfarin and most recently on therapeutic dosing of lovenox. On the most recent CTA (6/28), there was no visualization of the thrombosis.   - Hold off resuming anticoagulation for at least 72 hours (until 7/7) after drainage and will decide on final AC duration based on repeat imaging in 1 week       # Pancreatogenic diabetes  Hgb 6.3 on admission.   - Insulin glargine 7U twice daily for now (PTA 14 units BID)  - Sliding scale insulin  - Hypoglycemic protocol    # HTN  - hold home lisinopril 10mg daily  - monitor BPs     Diet: NPO for Medical/Clinical Reasons Except for: Meds    Fluids: none  DVT Prophylaxis: Pneumatic Compression Devices  Teresa Catheter: not present  Code Status: Full Code      Disposition Plan   Expected discharge: 2 - 3 days, recommended to prior living arrangement once adequate pain management/ tolerating PO medications and full surgical recovery.  Entered: Fabienne Oliva MD 07/04/2020, 10:29 AM       The patient's care was discussed with the Attending Physician, Dr. Barrow.    Fabienne \"KP\" MD Pj    Internal Medicine, PGY-3  [facility]  Pager: [phone]  ______________________________________________________________________    Interval History   Nurse note reviewed. No acute events overnight. Afebrile. States that he still feels bloated but denies worsening fever, chills, abdominal pain, nausea, or vomiting.     4pt review of systems negative except as indicated in HPI.    Data reviewed today: I reviewed all medications, new labs and imaging results over the last 24 hours.     Physical Exam   Vital Signs: Temp: 98.1  F (36.7  C) Temp src: Oral BP: 125/58 Pulse: 72   Resp: 16 SpO2: 96 % O2 Device: None (Room air)    Weight: 171 lbs 0 oz  GEN: resting comfortably in bed, appears in no apparent distress  HEENT: nc/at. eomi, perrla. Oral/nasal mucosa moist without ulceration  CARDIAC: rrr no m/g/r. no ROSIBEL " noted.  LUNG: ctab no w/r/r. bilateral symmetric chest expansion.  ABDOMEN: soft, distended, non-tender to palpation. +bs in 4 quadrants. tympanic to percussion. No palpable organomegaly  MSK/SKIN: skin warm and well-perfused. no rashes. 2+ bilateral pedal pulses. , trace LE edema to the mid shin b/l  NEURO: alert and oriented x 3. no focal neurologic deficits. 5/5 bilateral motor strength.      Data   Reviewed.

## 2020-07-05 LAB
ALBUMIN SERPL-MCNC: 1.8 G/DL (ref 3.4–5)
ALP SERPL-CCNC: 316 U/L (ref 40–150)
ALT SERPL W P-5'-P-CCNC: 18 U/L (ref 0–70)
ANION GAP SERPL CALCULATED.3IONS-SCNC: 6 MMOL/L (ref 3–14)
AST SERPL W P-5'-P-CCNC: 18 U/L (ref 0–45)
BILIRUB SERPL-MCNC: 0.5 MG/DL (ref 0.2–1.3)
BUN SERPL-MCNC: 6 MG/DL (ref 7–30)
CALCIUM SERPL-MCNC: 8.2 MG/DL (ref 8.5–10.1)
CHLORIDE SERPL-SCNC: 104 MMOL/L (ref 94–109)
CO2 SERPL-SCNC: 25 MMOL/L (ref 20–32)
CREAT SERPL-MCNC: 0.66 MG/DL (ref 0.66–1.25)
ERYTHROCYTE [DISTWIDTH] IN BLOOD BY AUTOMATED COUNT: 16.6 % (ref 10–15)
GFR SERPL CREATININE-BSD FRML MDRD: >90 ML/MIN/{1.73_M2}
GLUCOSE BLDC GLUCOMTR-MCNC: 172 MG/DL (ref 70–99)
GLUCOSE BLDC GLUCOMTR-MCNC: 175 MG/DL (ref 70–99)
GLUCOSE BLDC GLUCOMTR-MCNC: 179 MG/DL (ref 70–99)
GLUCOSE BLDC GLUCOMTR-MCNC: 180 MG/DL (ref 70–99)
GLUCOSE BLDC GLUCOMTR-MCNC: 213 MG/DL (ref 70–99)
GLUCOSE BLDC GLUCOMTR-MCNC: 227 MG/DL (ref 70–99)
GLUCOSE SERPL-MCNC: 192 MG/DL (ref 70–99)
HCT VFR BLD AUTO: 31.6 % (ref 40–53)
HGB BLD-MCNC: 9.4 G/DL (ref 13.3–17.7)
MAGNESIUM SERPL-MCNC: 2.1 MG/DL (ref 1.6–2.3)
MCH RBC QN AUTO: 27.7 PG (ref 26.5–33)
MCHC RBC AUTO-ENTMCNC: 29.7 G/DL (ref 31.5–36.5)
MCV RBC AUTO: 93 FL (ref 78–100)
PHOSPHATE SERPL-MCNC: 2.9 MG/DL (ref 2.5–4.5)
PLATELET # BLD AUTO: 305 10E9/L (ref 150–450)
POTASSIUM SERPL-SCNC: 4.1 MMOL/L (ref 3.4–5.3)
PROT SERPL-MCNC: 6.4 G/DL (ref 6.8–8.8)
RBC # BLD AUTO: 3.39 10E12/L (ref 4.4–5.9)
SODIUM SERPL-SCNC: 135 MMOL/L (ref 133–144)
WBC # BLD AUTO: 6.1 10E9/L (ref 4–11)

## 2020-07-05 PROCEDURE — 99233 SBSQ HOSP IP/OBS HIGH 50: CPT | Mod: GC | Performed by: HOSPITALIST

## 2020-07-05 PROCEDURE — 85027 COMPLETE CBC AUTOMATED: CPT | Performed by: INTERNAL MEDICINE

## 2020-07-05 PROCEDURE — 36415 COLL VENOUS BLD VENIPUNCTURE: CPT | Performed by: INTERNAL MEDICINE

## 2020-07-05 PROCEDURE — 80053 COMPREHEN METABOLIC PANEL: CPT | Performed by: INTERNAL MEDICINE

## 2020-07-05 PROCEDURE — 83735 ASSAY OF MAGNESIUM: CPT | Performed by: INTERNAL MEDICINE

## 2020-07-05 PROCEDURE — 25000132 ZZH RX MED GY IP 250 OP 250 PS 637: Performed by: STUDENT IN AN ORGANIZED HEALTH CARE EDUCATION/TRAINING PROGRAM

## 2020-07-05 PROCEDURE — 27210437 ZZH NUTRITION PRODUCT SEMIELEM INTERMED LITER

## 2020-07-05 PROCEDURE — 40000894 ZZH STATISTIC OT IP EVAL DEFER: Performed by: OCCUPATIONAL THERAPIST

## 2020-07-05 PROCEDURE — 25000132 ZZH RX MED GY IP 250 OP 250 PS 637: Performed by: INTERNAL MEDICINE

## 2020-07-05 PROCEDURE — 84100 ASSAY OF PHOSPHORUS: CPT | Performed by: INTERNAL MEDICINE

## 2020-07-05 PROCEDURE — 99207 ZZC CDG-CUT & PASTE-POTENTIAL IMPACT ON LEVEL: CPT | Performed by: HOSPITALIST

## 2020-07-05 PROCEDURE — 12000001 ZZH R&B MED SURG/OB UMMC

## 2020-07-05 PROCEDURE — 25000128 H RX IP 250 OP 636: Performed by: INTERNAL MEDICINE

## 2020-07-05 PROCEDURE — 00000146 ZZHCL STATISTIC GLUCOSE BY METER IP

## 2020-07-05 RX ADMIN — OMEPRAZOLE 20 MG: 20 CAPSULE, DELAYED RELEASE ORAL at 07:30

## 2020-07-05 RX ADMIN — OMEPRAZOLE 20 MG: 20 CAPSULE, DELAYED RELEASE ORAL at 16:53

## 2020-07-05 RX ADMIN — INSULIN ASPART 2 UNITS: 100 INJECTION, SOLUTION INTRAVENOUS; SUBCUTANEOUS at 00:14

## 2020-07-05 RX ADMIN — BENZOCAINE AND MENTHOL 1 LOZENGE: 15; 3.6 LOZENGE ORAL at 16:53

## 2020-07-05 RX ADMIN — DOCUSATE SODIUM AND SENNOSIDES 2 TABLET: 8.6; 5 TABLET ORAL at 20:07

## 2020-07-05 RX ADMIN — DOCUSATE SODIUM AND SENNOSIDES 2 TABLET: 8.6; 5 TABLET ORAL at 07:30

## 2020-07-05 RX ADMIN — PIPERACILLIN AND TAZOBACTAM 3.38 G: 3; .375 INJECTION, POWDER, FOR SOLUTION INTRAVENOUS at 16:53

## 2020-07-05 RX ADMIN — PIPERACILLIN AND TAZOBACTAM 3.38 G: 3; .375 INJECTION, POWDER, FOR SOLUTION INTRAVENOUS at 10:24

## 2020-07-05 RX ADMIN — PIPERACILLIN AND TAZOBACTAM 3.38 G: 3; .375 INJECTION, POWDER, FOR SOLUTION INTRAVENOUS at 04:11

## 2020-07-05 RX ADMIN — PANCRELIPASE 24000 UNITS: 24000; 76000; 120000 CAPSULE, DELAYED RELEASE PELLETS ORAL at 11:08

## 2020-07-05 RX ADMIN — INSULIN ASPART 2 UNITS: 100 INJECTION, SOLUTION INTRAVENOUS; SUBCUTANEOUS at 20:07

## 2020-07-05 RX ADMIN — INSULIN ASPART 1 UNITS: 100 INJECTION, SOLUTION INTRAVENOUS; SUBCUTANEOUS at 16:53

## 2020-07-05 RX ADMIN — MULTIVITAMIN 15 ML: LIQUID ORAL at 07:30

## 2020-07-05 RX ADMIN — INSULIN GLARGINE 7 UNITS: 100 INJECTION, SOLUTION SUBCUTANEOUS at 07:30

## 2020-07-05 RX ADMIN — PANCRELIPASE 24000 UNITS: 24000; 76000; 120000 CAPSULE, DELAYED RELEASE PELLETS ORAL at 20:07

## 2020-07-05 RX ADMIN — PIPERACILLIN AND TAZOBACTAM 3.38 G: 3; .375 INJECTION, POWDER, FOR SOLUTION INTRAVENOUS at 21:49

## 2020-07-05 RX ADMIN — BENZOCAINE AND MENTHOL 1 LOZENGE: 15; 3.6 LOZENGE ORAL at 22:37

## 2020-07-05 RX ADMIN — INSULIN ASPART 1 UNITS: 100 INJECTION, SOLUTION INTRAVENOUS; SUBCUTANEOUS at 07:29

## 2020-07-05 RX ADMIN — INSULIN ASPART 1 UNITS: 100 INJECTION, SOLUTION INTRAVENOUS; SUBCUTANEOUS at 11:12

## 2020-07-05 ASSESSMENT — ACTIVITIES OF DAILY LIVING (ADL)
ADLS_ACUITY_SCORE: 17

## 2020-07-05 NOTE — PLAN OF CARE
7C OT    Discharge Planner OT   Patient plan for discharge: Home  Current status: Evaluation orders received and appreciated. Pt lives alone in an apartment, IND with all ADL/IADLs at baseline. Pt has a cane that he uses for uneven surfaces or on longer outings. Pt reports safe discharge plan, has assist available from friends and neighbors as needed. Today, pt IND donned shoes, stood at sink for g/h, ambulated x600+ ft in hallway pushing IV pole, and with no AE, at brisk pace with good balance. Educated pt in importance of activity for recovery, encouraged pt to ambulate in tran. Pt verbalized understanding. No IP OT needs identified, will complete orders. Please re-order OT if pt has lengthy hospitalization and/or new needs arise.   Barriers to return to prior living situation: medical status  Recommendations for discharge: Home with assist for heavier IADLs, as needed.  Rationale for recommendations: Pt at ADL baseline, no IP OT needs identified.        Entered by: Radha Beltrán 07/05/2020 8:29 AM

## 2020-07-05 NOTE — PLAN OF CARE
Discharge Planner PT   Patient plan for discharge: home  Current status: PT: Per chart review and discussion with OT, pt does not have acute PT needs. Pt is mobilizing independently and safely, please see OT notes for details. PT will defer and complete order. Pt to continue to ambulate on unit independently during admission.  Barriers to return to prior living situation: medical status  Recommendations for discharge: home  Rationale for recommendations: see OT notes       Entered by: Germania Thomson 07/05/2020 5:50 PM

## 2020-07-05 NOTE — PROGRESS NOTES
GI Progress Note  Date of Service:  7/5/2020      Assessment:   64 year old male with a history of idiopathic pancreatitis and recent hospital stay in April complicated by ileus, SMV thrombosis and splenic vein thrombosis    Pancreatic pseudocyst possibly resulting in gastric outlet obstruction  Patient had the recent CT showing possibility of air inside the pseudocyst which can either be due to fistulization of pseudocyst with the bowel or an infected pseudocyst. He is now s/p EUS with large walled-off necrosis seen posterior to stomach with transgastric drainage using a 15 mm Axios (dilated to 15 mm) and placement of a 10 Fr x 3 cm DPT Solus stent.      Recommendations:   -- NJ tube may be used  -- Continue IV Zosyn for the next 5 days  -- Avoid anticoagulation for 72 hours post-procedure  -- Repeat CT later this week and then repeat necrosectomy after     Patient was discussed with Dr. Tripp Benavidez MD   GI Fellow  Pager: [phone]      __________________________________________________________________________________  Subjective:  Nursing notes reviewed and noted.  Doing well and walking around this morning.  No fever, no abdominal pain.     Physical Examination:  Vital Signs:  /54 (BP Location: Right arm)   Pulse 72   Temp 95.6  F (35.3  C) (Oral)   Resp 16   Wt 77.6 kg (171 lb)   SpO2 96%    General:  NAD.  HEENT:  PERRL, NJ tube in situ  Neck:  Supple.   Chest:  Unlabored    Cardiovascular: RRR. No m/r/g.   Abdomen:  Soft, NT, ND.    Extremities:  No peripheral edema.   Skin:  No rashes   Neurological:  Grossly normal.     DATA:  Labs:    Reviewed and noted

## 2020-07-05 NOTE — PLAN OF CARE
/54 (BP Location: Right arm)   Pulse 72   Temp 95.9  F (35.5  C) (Oral)   Resp 15   Wt 77.6 kg (171 lb)   SpO2 97%   Assummed care from 2994-3728. All VSS on RA, A&Ox4, denies pain and nausea. Tolerating clear liquid diet, BGs checked and corrected overnight, refused 0400 insulin due to home routine. TF running at 20ml/hr through NJ, NJ otherwise WNL. Two left PIVs one TKO in between antibiotics, the other SL. Passing gas, no BM since 7/2. Voiding spont, urinal at bedside. Up ad bigg. Continue POC.

## 2020-07-05 NOTE — PLAN OF CARE
Transfer from [facility] for surgical evaluation by GI for the pseudocyst on 07/02/20  POD # 1 Endoscopic ultrasound with cyst gastrostomy, dilation and stent placement, nasojejunal feeding tube placement      Pain: denies. Throat discomfort managed with cepacol lozenges  Nausea: denies   Lab: BS Q4- 175, 172, 180  /64 (BP Location: Right arm)   Pulse 90   Temp 96.4  F (35.8  C) (Oral)   Resp 16   Wt 77.6 kg (171 lb)   SpO2 97%    Output: Voiding, not saving   Diet: Fulls, tolerating well/ continuous tube feeds @ 30 ml/hr. Goal rate- 60 ml/hr. Ok to advance at 1900  Activity: UAL   Bowel Function: Bowel sounds heard in all quadrants, passing flatus, no bm this shift. Need stool sample, patient aware.   Lines: L PIV, infusing TKO in between abx, dressing CDI   Tubes: NJ in place, tube feeds at 30 ml/hr   Skin: L posterior heel resolving wound  Plan: Continue to monitor pain, nausea, VS, output, and bowel function.

## 2020-07-05 NOTE — PROGRESS NOTES
General acute hospital, [place]    Progress Note - Mainor 2 Service        Date of Admission:  7/2/2020    Assessment & Plan   Alexandr Almonte is a 63 yo male with a history of HTN and complicated necrotizing pancreatitis with recent CT findings of a complex cystic structure and air within a worsening WON concerning for infection. He is presenting as a transfer from [facility] for surgical evaluation by GI for the pseudocyst on 07/02/20.     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 Most recent OSH CT imaging (6/28) shows progressive change with a large complex cystic structure in the mid abdomen and a worsening pancreatic pseudocyst with evidence of air concerning for infection. He is currently on Zosyn for suspected infection of the pseudocyst. GI evaluated the patient and suspects that the gas in the collection is due to fistula and that the pseudocyst is causing gastric outlet obstruction. EUS-guided cystogastrostomy with NJ tube placement (7/4), patient doing well.   - GI Panc/Bili consult, appreciate recs  - EUS-guided cystogastrostomy with NJ tube placement (7/4)  - Dietician consulted, appreciate recs              - Creon 2-3 capsules of 24,000 plus PRN 2 capsules with snacks TID with meals              - Fecal elastase: pending   - Tube feeds via NJ at a rate of 60ml/hr, tolerating well   - Monitor mag and phos  - Antibiotics:              - Continue Zosyn for next 5 days after OR procedure (7/5 - 7/9) for prophylaxis   - Omeprazole 20 mg PO BID     # SMV & Splenic vein thrombosis  2/2  The patient was initiated on warfarin and most recently on therapeutic dosing of lovenox. On the most recent CTA (6/28), there was no visualization of the thrombosis.   - Hold off resuming anticoagulation for at least 72 hours (until 7/7) after drainage and will decide on final AC duration based on repeat imaging in 1 week       # Pancreatogenic diabetes  Hgb 6.3 on admission.   - Insulin glargine increased to home 14U BID  - Sliding scale insulin  - Hypoglycemic protocol     # HTN  - hold home lisinopril 10mg daily  - monitor BPs     Diet:   Full liquid diet, advance as tolerated. TF at 60ml/hr  Fluids: None  Lines: PIV  DVT Prophylaxis: Pneumatic Compression Devices  Teresa Catheter: not present  Code Status:   Full Code         Disposition Plan   Expected discharge: 4 - 7 days, recommended to prior living arrangement once IV antibiotics completed and pending repeat CT imaging.  Entered: Aminah Zepeda MD 07/05/2020, 11:21 AM       The patient's care was discussed with the Attending Physician, Dr. Barrow.    Aminah Zepeda MD  [address], [place]  Pager: [phone]  Please see sticky note for cross cover information  ______________________________________________________________________    Interval History   No acute events overnight, VSS and afebrile. Patient states that he feels well this morning after the procedure yesterday and has no abd pain, n/v, or SOB. He is tolerating the NJ well. Has not had a BM since 7/2, but is passing gas. Good UOP. Patient up and walking around room.    4pt review of systems negative except as indicated in HPI.    Data reviewed today: I reviewed all medications, new labs and imaging results over the last 24 hours.     Physical Exam   Vital Signs: Temp: 95.6  F (35.3  C) Temp src: Oral BP: 109/54 Pulse: 72 Heart Rate: 68 Resp: 16 SpO2: 96 % O2 Device: None (Room air) Oxygen Delivery: 2 LPM  Weight: 171 lbs 0 oz  GEN: resting  comfortably in bed, appears in no apparent distress, NJ in place with TF  HEENT: nc/at. eomi, perrla. Oral mucosa moist and without lesion   CARDIAC: rrr no m/g/r. No LE edema.  LUNG: ctab no w/r/r. bilateral symmetric chest expansion.  ABDOMEN: soft, mildly distended, not tender to palpation, +bs  MSK/SKIN: skin warm and well-perfused. no rashes.   NEURO: alert and oriented x 3. no focal neurologic deficits. 5/5 bilateral motor strength.      Data   Reviewed.

## 2020-07-06 ENCOUNTER — APPOINTMENT (OUTPATIENT)
Dept: GENERAL RADIOLOGY | Facility: CLINIC | Age: 65
DRG: 405 | End: 2020-07-06
Attending: INTERNAL MEDICINE
Payer: OTHER GOVERNMENT

## 2020-07-06 ENCOUNTER — HOME INFUSION (PRE-WILLOW HOME INFUSION) (OUTPATIENT)
Dept: PHARMACY | Facility: CLINIC | Age: 65
End: 2020-07-06

## 2020-07-06 LAB
ANION GAP SERPL CALCULATED.3IONS-SCNC: 3 MMOL/L (ref 3–14)
BUN SERPL-MCNC: 8 MG/DL (ref 7–30)
CALCIUM SERPL-MCNC: 8 MG/DL (ref 8.5–10.1)
CHLORIDE SERPL-SCNC: 104 MMOL/L (ref 94–109)
CO2 SERPL-SCNC: 30 MMOL/L (ref 20–32)
CREAT SERPL-MCNC: 0.67 MG/DL (ref 0.66–1.25)
ERYTHROCYTE [DISTWIDTH] IN BLOOD BY AUTOMATED COUNT: 16.9 % (ref 10–15)
GFR SERPL CREATININE-BSD FRML MDRD: >90 ML/MIN/{1.73_M2}
GLUCOSE BLDC GLUCOMTR-MCNC: 180 MG/DL (ref 70–99)
GLUCOSE BLDC GLUCOMTR-MCNC: 214 MG/DL (ref 70–99)
GLUCOSE BLDC GLUCOMTR-MCNC: 256 MG/DL (ref 70–99)
GLUCOSE BLDC GLUCOMTR-MCNC: 272 MG/DL (ref 70–99)
GLUCOSE BLDC GLUCOMTR-MCNC: 283 MG/DL (ref 70–99)
GLUCOSE BLDC GLUCOMTR-MCNC: 287 MG/DL (ref 70–99)
GLUCOSE SERPL-MCNC: 178 MG/DL (ref 70–99)
HCT VFR BLD AUTO: 29.8 % (ref 40–53)
HGB BLD-MCNC: 9 G/DL (ref 13.3–17.7)
MAGNESIUM SERPL-MCNC: 2 MG/DL (ref 1.6–2.3)
MCH RBC QN AUTO: 28.2 PG (ref 26.5–33)
MCHC RBC AUTO-ENTMCNC: 30.2 G/DL (ref 31.5–36.5)
MCV RBC AUTO: 93 FL (ref 78–100)
PHOSPHATE SERPL-MCNC: 2.4 MG/DL (ref 2.5–4.5)
PLATELET # BLD AUTO: 313 10E9/L (ref 150–450)
POTASSIUM SERPL-SCNC: 3.9 MMOL/L (ref 3.4–5.3)
RBC # BLD AUTO: 3.19 10E12/L (ref 4.4–5.9)
SODIUM SERPL-SCNC: 137 MMOL/L (ref 133–144)
WBC # BLD AUTO: 6.5 10E9/L (ref 4–11)

## 2020-07-06 PROCEDURE — 71046 X-RAY EXAM CHEST 2 VIEWS: CPT

## 2020-07-06 PROCEDURE — 27210437 ZZH NUTRITION PRODUCT SEMIELEM INTERMED LITER

## 2020-07-06 PROCEDURE — 00000146 ZZHCL STATISTIC GLUCOSE BY METER IP

## 2020-07-06 PROCEDURE — 12000001 ZZH R&B MED SURG/OB UMMC

## 2020-07-06 PROCEDURE — 25000132 ZZH RX MED GY IP 250 OP 250 PS 637: Performed by: INTERNAL MEDICINE

## 2020-07-06 PROCEDURE — 25000132 ZZH RX MED GY IP 250 OP 250 PS 637: Performed by: STUDENT IN AN ORGANIZED HEALTH CARE EDUCATION/TRAINING PROGRAM

## 2020-07-06 PROCEDURE — 82656 EL-1 FECAL QUAL/SEMIQ: CPT | Performed by: INTERNAL MEDICINE

## 2020-07-06 PROCEDURE — 99233 SBSQ HOSP IP/OBS HIGH 50: CPT | Mod: GC | Performed by: HOSPITALIST

## 2020-07-06 PROCEDURE — 36415 COLL VENOUS BLD VENIPUNCTURE: CPT | Performed by: STUDENT IN AN ORGANIZED HEALTH CARE EDUCATION/TRAINING PROGRAM

## 2020-07-06 PROCEDURE — 25000128 H RX IP 250 OP 636: Performed by: INTERNAL MEDICINE

## 2020-07-06 PROCEDURE — 99207 ZZC CDG-CUT & PASTE-POTENTIAL IMPACT ON LEVEL: CPT | Performed by: HOSPITALIST

## 2020-07-06 PROCEDURE — 83735 ASSAY OF MAGNESIUM: CPT | Performed by: STUDENT IN AN ORGANIZED HEALTH CARE EDUCATION/TRAINING PROGRAM

## 2020-07-06 PROCEDURE — 85027 COMPLETE CBC AUTOMATED: CPT | Performed by: STUDENT IN AN ORGANIZED HEALTH CARE EDUCATION/TRAINING PROGRAM

## 2020-07-06 PROCEDURE — 80048 BASIC METABOLIC PNL TOTAL CA: CPT | Performed by: STUDENT IN AN ORGANIZED HEALTH CARE EDUCATION/TRAINING PROGRAM

## 2020-07-06 PROCEDURE — 84100 ASSAY OF PHOSPHORUS: CPT | Performed by: STUDENT IN AN ORGANIZED HEALTH CARE EDUCATION/TRAINING PROGRAM

## 2020-07-06 RX ORDER — POLYETHYLENE GLYCOL 3350 17 G/17G
17 POWDER, FOR SOLUTION ORAL DAILY
Status: DISCONTINUED | OUTPATIENT
Start: 2020-07-06 | End: 2020-07-16 | Stop reason: HOSPADM

## 2020-07-06 RX ADMIN — PIPERACILLIN AND TAZOBACTAM 3.38 G: 3; .375 INJECTION, POWDER, FOR SOLUTION INTRAVENOUS at 16:26

## 2020-07-06 RX ADMIN — INSULIN ASPART 1 UNITS: 100 INJECTION, SOLUTION INTRAVENOUS; SUBCUTANEOUS at 04:26

## 2020-07-06 RX ADMIN — PIPERACILLIN AND TAZOBACTAM 3.38 G: 3; .375 INJECTION, POWDER, FOR SOLUTION INTRAVENOUS at 04:13

## 2020-07-06 RX ADMIN — OMEPRAZOLE 20 MG: 20 CAPSULE, DELAYED RELEASE ORAL at 08:23

## 2020-07-06 RX ADMIN — INSULIN ASPART 3 UNITS: 100 INJECTION, SOLUTION INTRAVENOUS; SUBCUTANEOUS at 20:08

## 2020-07-06 RX ADMIN — MULTIVITAMIN 15 ML: LIQUID ORAL at 08:24

## 2020-07-06 RX ADMIN — INSULIN ASPART 1 UNITS: 100 INJECTION, SOLUTION INTRAVENOUS; SUBCUTANEOUS at 08:25

## 2020-07-06 RX ADMIN — INSULIN ASPART 3 UNITS: 100 INJECTION, SOLUTION INTRAVENOUS; SUBCUTANEOUS at 11:59

## 2020-07-06 RX ADMIN — DOCUSATE SODIUM AND SENNOSIDES 2 TABLET: 8.6; 5 TABLET ORAL at 20:08

## 2020-07-06 RX ADMIN — INSULIN ASPART 3 UNITS: 100 INJECTION, SOLUTION INTRAVENOUS; SUBCUTANEOUS at 23:46

## 2020-07-06 RX ADMIN — POLYETHYLENE GLYCOL 3350 17 G: 17 POWDER, FOR SOLUTION ORAL at 10:10

## 2020-07-06 RX ADMIN — DOCUSATE SODIUM AND SENNOSIDES 1 TABLET: 8.6; 5 TABLET ORAL at 08:23

## 2020-07-06 RX ADMIN — PANCRELIPASE 48000 UNITS: 24000; 76000; 120000 CAPSULE, DELAYED RELEASE PELLETS ORAL at 16:26

## 2020-07-06 RX ADMIN — PANCRELIPASE 48000 UNITS: 24000; 76000; 120000 CAPSULE, DELAYED RELEASE PELLETS ORAL at 08:22

## 2020-07-06 RX ADMIN — PIPERACILLIN AND TAZOBACTAM 3.38 G: 3; .375 INJECTION, POWDER, FOR SOLUTION INTRAVENOUS at 21:29

## 2020-07-06 RX ADMIN — INSULIN ASPART 2 UNITS: 100 INJECTION, SOLUTION INTRAVENOUS; SUBCUTANEOUS at 00:00

## 2020-07-06 RX ADMIN — PIPERACILLIN AND TAZOBACTAM 3.38 G: 3; .375 INJECTION, POWDER, FOR SOLUTION INTRAVENOUS at 10:10

## 2020-07-06 RX ADMIN — BENZOCAINE AND MENTHOL 1 LOZENGE: 15; 3.6 LOZENGE ORAL at 04:15

## 2020-07-06 RX ADMIN — OMEPRAZOLE 20 MG: 20 CAPSULE, DELAYED RELEASE ORAL at 16:26

## 2020-07-06 RX ADMIN — INSULIN ASPART 3 UNITS: 100 INJECTION, SOLUTION INTRAVENOUS; SUBCUTANEOUS at 16:24

## 2020-07-06 ASSESSMENT — ACTIVITIES OF DAILY LIVING (ADL)
ADLS_ACUITY_SCORE: 17

## 2020-07-06 ASSESSMENT — PAIN DESCRIPTION - DESCRIPTORS: DESCRIPTORS: DISCOMFORT;INTERMITTENT

## 2020-07-06 NOTE — PROGRESS NOTES
[facility], [place]    Progress Note - Mainor 2 Service        Date of Admission:  7/2/2020    Assessment & Plan   Alexandr Almonte is a 65 yo male with a history of HTN and complicated necrotizing pancreatitis with recent CT findings of a complex cystic structure and air within a worsening WON concerning for infection. He is presenting as a transfer from [facility] for surgical evaluation by GI for the pseudocyst on 07/02/20.     Change today:  - Plan for repeat CT imaging on 7/9 with necrosectomy 7/10 pending findings.  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 Most recent OSH CT imaging (6/28) shows progressive change with a large complex cystic structure in the mid abdomen and a worsening pancreatic pseudocyst with evidence of air concerning for infection. He is currently on Zosyn for suspected infection of the pseudocyst. GI evaluated the patient and suspects that the gas in the collection is due to fistula and that the pseudocyst is causing gastric outlet obstruction. EUS-guided cystogastrostomy with NJ tube placement (7/4), patient doing well. Will get repeat imaging on 7/9 with plan for necrosectomy on 7/10 pending findings.   - GI Panc/Bili consult, appreciate recs  - EUS-guided cystogastrostomy with NJ tube placement (7/4)  - Plan for repeat CT imaging on 7/9 with necrosectomy 7/10 pending findings.  - Dietician consulted, appreciate recs              - Creon 2-3 capsules of 24,000 plus PRN 2 capsules with snacks TID with meals              - Fecal elastase: pending              - Tube feeds via NJ at a rate of  60ml/hr, tolerating well              - Monitor mag and phos  - Antibiotics:              - Continue Zosyn for next 5 days after OR procedure (7/5 - 7/9) for prophylaxis   - Omeprazole 20 mg PO BID  - CXR to evaluate for pleural effusions at it was mentioned as a CT finding from OSH report      # SMV & Splenic vein thrombosis  2/2 The patient was initiated on warfarin and most recently on therapeutic dosing of lovenox. On the most recent CTA (6/28), there was no visualization of the thrombosis.   - Hold off resuming anticoagulation for at least 72 hours (until 7/7) after drainage and will decide on final AC duration based on repeat imaging on 7/9     # Pancreatogenic diabetes  Hgb 6.3 on admission.   - Insulin glargine 14U BID  - Sliding scale insulin  - Hypoglycemic protocol     # HTN  - hold home lisinopril 10mg daily  - monitor BPs     Diet:  Regular adult diet. TF at 60ml/hr  Fluids: None  Lines: PIV  DVT Prophylaxis: Pneumatic Compression Devices  Teresa Catheter: not present  Code Status:   Full Code         Disposition Plan   Expected discharge: 4 - 7 days, recommended to prior living arrangement once adequate surgical treatment of necrotizing pancreatitis complete.  Entered: Aminah Zepeda MD 07/06/2020, 12:23 PM       The patient's care was discussed with the Attending Physician, Dr. Barrow.    Aminah Zepeda MD  [address], [place]  Pager: [phone]  Please see sticky note for cross cover information  ______________________________________________________________________    Interval History   No acute events over night, VSS and afebrile. Patient has no n/v/d. One normal bowel movement this morning, though feels a bit constipated. Reports he feels hungry, but has no abdominal pain. Will put in for full diet and miralax.     4pt review of systems negative except as indicated in HPI.    Data reviewed today: I reviewed all medications, new labs and  imaging results over the last 24 hours.     Physical Exam   Vital Signs: Temp: 98.3  F (36.8  C) Temp src: Oral BP: 105/65 Pulse: 90 Heart Rate: 90 Resp: 16 SpO2: 96 % O2 Device: None (Room air)    Weight: 171 lbs 0 oz  GEN: resting comfortably in bed, appears in no apparent distress, NJ in place and tolerating well  HEENT: nc/at. eomi, perrla. Oral mucosa moist and without lesion  CARDIAC: rrr no m/g/r.  LUNG: ctab no w/r/r. bilateral symmetric chest expansion.  ABDOMEN: soft, moderately distended, not ttp. +bs in 4 quadrants. tympanic to percussion. No palpable organomegaly  MSK/SKIN: skin warm and well-perfused. no rashes. 2+ bilateral pedal pulses. no lower extremity, pitting edema.   NEURO: alert and oriented x 3. no focal neurologic deficits. 5/5 bilateral motor strength.      Data   Reviewed

## 2020-07-06 NOTE — PROGRESS NOTES
Care Coordinator - Discharge Planning    Admission Date/Time:  7/2/2020  Attending MD:  Brian Barrow*     Data  Date of initial CC assessment:  Patient has been followed since admission last week.  Patient transferred from a hospital in [facility].  Patient has not transportation benefits under his [employer] insurance and to be determined how patient will be able to transition to his home in [place] once stable for discharge.  Per the [facility] insurance inquiry, patient does have coverage for home enteral feedings and will have a Patient Learning Center Appointment scheduled for later this week to initiate education for home enteral feedings.  Chart reviewed, discussed with interdisciplinary team.   Patient was admitted for:   1. Necrotizing pancreatitis         Assessment     The following tentative plans of care have been arranged on behalf of patient once he is stable for discharge and [facility] continues to search for a local home care agency who can assist patient in the [place] area:    Please fax discharge orders to [facility]     Ph:  [phone]     Fax: [phone]     Skilled home care RN for initial home safety evaluation and 1-3 times a week to evaluate medication management, nutrition and hydration evaluation, endurance evaluation, and general status evaluation after discharge from the acute care hospital setting.     Skilled home care RN to assist with management and education reinforcement with home enteral NJ feedings per MD orders.  NJ cares per home care agency routine.       Plan  Anticipated Discharge Date:  To be determined.  Anticipated Discharge Plan:  Final plans of care pending.  Care Management RN will continue to follow and inpatient cares continue per MD orders.    ADEEL Gilliam.S.NANCY., R.N., P.H.N..  Care Coordinator     Pager   [facility]

## 2020-07-06 NOTE — PROGRESS NOTES
Therapy: Enteral  Insurance: [employer]  Ded: $150  Met: $150    Co-Insurance: 75%  Max Out of Pocket: $3000  Met: $1182    Please contact Intake with any questions, [phone] or In Basket pool, [facility] ([phone]). In reference to admission date 7/2/2020 to check Enteral coverage

## 2020-07-06 NOTE — PROGRESS NOTES
GASTROENTEROLOGY PROGRESS NOTE    Date: 07/06/2020     ASSESSMENT:  64-year-old man with a history of idiopathic enterocolitis and recent hospital stay in April complicated by ileus, SMV thrombosis, and splenic vein thrombosis.  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  There was possibility of air inside the pseudocyst which was thought either to be due to visualization of pseudocyst with bowel or an infection.  He underwent EUS guided transluminal drainage with placement of 50 mm AXIOS stent as well as 10 [place] by 3 cm double-pigtail Solus stent on 7/2.      Summary:  -- Etiology:Idiopathic  -- Date of onset: 4/28  -- BISAP score: Not available  -- Fluid collection               -- Infected:  Possibility of presence of air               -- Abx: Zosyn  -- Concurrent organ failure: N/A  -- Nutrition:    --NJ tube: Placed during procedure on 7/2               -- GJ Tube: N/A               -- PERT: Y  -- Necrosectomy- initial drainage on 7/2  -- Next Necrosectomy: Being planned for 7/10  -- Last CT: 6/28  -- Interventions:  Initial drainage 7/2  -- Drains: N/A  -- Thrombosis:  Known SMV and splenic vein thrombus, not on anticoagulation  -- Surgery consult: N    RECOMMENDATIONS:  -Plan for repeat CT abdomen pelvis with contrast on Thursday.  Will review and likely will need necrosectomy on Friday.  -Continue tube feeds through NJ  -Okay to take p.o. intake if tolerated  -Hold anticoagulation for 72 hours post procedure  -Continue antibiotics for 5 days of the procedure    Gastroenterology follow up recommendations: Pending clinical course.      Thank you for involving us in this patient's care. Please do not hesitate to contact the GI service with any questions or concerns.      Pt care plan discussed with Dr. Almaguer, GI staff physician.    Kwesi SYED MD  Gastroenterology Fellow  Division of Gastroenterology, Hepatology and Nutrition  [facility]  [place]    _______________________________________________________________    24 Hour Events:  No acute events overnight    Subjective:  Patient feels well overall  Denies any chest pain, shortness of breath  Denies any abdominal pain, nausea, vomiting.  Does feel some fullness in the abdomen.  No blood in the stool or black stools.    Objective:  Blood pressure 105/65, pulse 90, temperature 98.3  F (36.8  C), temperature source Oral, resp. rate 16, weight 77.6 kg (171 lb), SpO2 96 %.    Gen: A&Ox3, NAD  HEENT: ncat, perrl, eomi, sclera anicteric.  NG tube in place.  CV: RRR  Lungs: CTA b/l  Abd:  +bs, soft, nd/nt  Skin: no jaundice, no stigmata of chronic liver disease  MS: appropriate muscle mass for age  Neuro: non focal       LABS:  BMP  Recent Labs   Lab 07/06/20  0614 07/05/20  0702 07/04/20  0653 07/04/20  0002 07/03/20  0721    135 135  --  135   POTASSIUM 3.9 4.1 4.1  --  3.8   CHLORIDE 104 104 105  --  103   BARAK 8.0* 8.2* 8.8  --  8.6   CO2 30 25 24  --  27   BUN 8 6* 4* 5* 4*   CR 0.67 0.66 0.70  --  0.76   * 192* 133*  --  126*     CBC  Recent Labs   Lab 07/06/20  0614 07/05/20  0702 07/04/20  0653 07/04/20  0002   WBC 6.5 6.1 9.8 5.5   RBC 3.19* 3.39* 3.85* 3.09*   HGB 9.0* 9.4* 10.7* 8.7*   HCT 29.8* 31.6* 35.9* 28.6*   MCV 93 93 93 93   MCH 28.2 27.7 27.8 28.2   MCHC 30.2* 29.7* 29.8* 30.4*   RDW 16.9* 16.6* 16.7* 16.4*    305 443 286     INR  Recent Labs   Lab 07/04/20  0002 07/02/20  2105   INR 1.18* 1.19*     LFTs  Recent Labs   Lab 07/05/20  0702 07/04/20  0653 07/03/20  0721 07/02/20  2105   ALKPHOS 316* 471* 409* 430*   AST 18 32 23 21   ALT 18 25 23 26   BILITOTAL 0.5 1.2 1.1 0.9   PROTTOTAL 6.4* 7.7 7.1 7.3   ALBUMIN 1.8* 2.2* 2.2* 2.2*      PANCNo lab results found in last 7 days.    IMAGING:  reviewed

## 2020-07-06 NOTE — PLAN OF CARE
/53 (BP Location: Right arm)   Pulse 75   Temp 97.5  F (36.4  C) (Oral)   Resp 14   Wt 77.6 kg (171 lb)   SpO2 98%   Assumed care from 9911-8132. VSS on RA, A&Ox4, denies pain and nausea. Tolerating clear liquid diet, TF advanced to 50cc/hr, BGs checked and corrected Q4. Left PIV TKO in between antibiotics. NJ infusing TF, flushed 60cc Q4 per orders, otherwise WNL. Passing gas, no BM since 7/2, stool sample still needed. Voiding spont, urinal at bedside. Up ad bigg. Continue POC.

## 2020-07-06 NOTE — PLAN OF CARE
VSS on RA. Denies pain and nausea. Tolerating regular diet in good amounts, TF advanced to 60cc/hr. BG q4. Left PIV TKO in between antibiotics. Passing gas, BM this AM per patient. Reminded stool sample still needed, hat in toilet. Voiding spont, urinal at bedside. Up ad bigg. Continue POC.

## 2020-07-07 LAB
ANION GAP SERPL CALCULATED.3IONS-SCNC: 6 MMOL/L (ref 3–14)
BUN SERPL-MCNC: 15 MG/DL (ref 7–30)
CALCIUM SERPL-MCNC: 8.6 MG/DL (ref 8.5–10.1)
CHLORIDE SERPL-SCNC: 101 MMOL/L (ref 94–109)
CO2 SERPL-SCNC: 26 MMOL/L (ref 20–32)
CREAT SERPL-MCNC: 0.65 MG/DL (ref 0.66–1.25)
GFR SERPL CREATININE-BSD FRML MDRD: >90 ML/MIN/{1.73_M2}
GLUCOSE BLDC GLUCOMTR-MCNC: 197 MG/DL (ref 70–99)
GLUCOSE BLDC GLUCOMTR-MCNC: 203 MG/DL (ref 70–99)
GLUCOSE BLDC GLUCOMTR-MCNC: 230 MG/DL (ref 70–99)
GLUCOSE BLDC GLUCOMTR-MCNC: 239 MG/DL (ref 70–99)
GLUCOSE BLDC GLUCOMTR-MCNC: 285 MG/DL (ref 70–99)
GLUCOSE BLDC GLUCOMTR-MCNC: 307 MG/DL (ref 70–99)
GLUCOSE BLDC GLUCOMTR-MCNC: 344 MG/DL (ref 70–99)
GLUCOSE BLDC GLUCOMTR-MCNC: 412 MG/DL (ref 70–99)
GLUCOSE BLDC GLUCOMTR-MCNC: 424 MG/DL (ref 70–99)
GLUCOSE SERPL-MCNC: 290 MG/DL (ref 70–99)
MAGNESIUM SERPL-MCNC: 1.9 MG/DL (ref 1.6–2.3)
PHOSPHATE SERPL-MCNC: 2 MG/DL (ref 2.5–4.5)
POTASSIUM SERPL-SCNC: 4.5 MMOL/L (ref 3.4–5.3)
PREALB SERPL IA-MCNC: 6 MG/DL (ref 15–45)
SODIUM SERPL-SCNC: 134 MMOL/L (ref 133–144)

## 2020-07-07 PROCEDURE — 12000001 ZZH R&B MED SURG/OB UMMC

## 2020-07-07 PROCEDURE — 80048 BASIC METABOLIC PNL TOTAL CA: CPT | Performed by: STUDENT IN AN ORGANIZED HEALTH CARE EDUCATION/TRAINING PROGRAM

## 2020-07-07 PROCEDURE — 25000132 ZZH RX MED GY IP 250 OP 250 PS 637: Performed by: INTERNAL MEDICINE

## 2020-07-07 PROCEDURE — 40000141 ZZH STATISTIC PERIPHERAL IV START W/O US GUIDANCE

## 2020-07-07 PROCEDURE — 25000128 H RX IP 250 OP 636: Performed by: INTERNAL MEDICINE

## 2020-07-07 PROCEDURE — 25800030 ZZH RX IP 258 OP 636: Performed by: STUDENT IN AN ORGANIZED HEALTH CARE EDUCATION/TRAINING PROGRAM

## 2020-07-07 PROCEDURE — 99232 SBSQ HOSP IP/OBS MODERATE 35: CPT | Mod: GC | Performed by: HOSPITALIST

## 2020-07-07 PROCEDURE — 25000132 ZZH RX MED GY IP 250 OP 250 PS 637: Performed by: STUDENT IN AN ORGANIZED HEALTH CARE EDUCATION/TRAINING PROGRAM

## 2020-07-07 PROCEDURE — 83735 ASSAY OF MAGNESIUM: CPT | Performed by: STUDENT IN AN ORGANIZED HEALTH CARE EDUCATION/TRAINING PROGRAM

## 2020-07-07 PROCEDURE — 27210437 ZZH NUTRITION PRODUCT SEMIELEM INTERMED LITER

## 2020-07-07 PROCEDURE — 00000146 ZZHCL STATISTIC GLUCOSE BY METER IP

## 2020-07-07 PROCEDURE — 36415 COLL VENOUS BLD VENIPUNCTURE: CPT | Performed by: STUDENT IN AN ORGANIZED HEALTH CARE EDUCATION/TRAINING PROGRAM

## 2020-07-07 PROCEDURE — 84100 ASSAY OF PHOSPHORUS: CPT | Performed by: STUDENT IN AN ORGANIZED HEALTH CARE EDUCATION/TRAINING PROGRAM

## 2020-07-07 PROCEDURE — 25000125 ZZHC RX 250: Performed by: STUDENT IN AN ORGANIZED HEALTH CARE EDUCATION/TRAINING PROGRAM

## 2020-07-07 RX ORDER — DEXTROSE MONOHYDRATE 25 G/50ML
25-50 INJECTION, SOLUTION INTRAVENOUS
Status: DISCONTINUED | OUTPATIENT
Start: 2020-07-07 | End: 2020-07-14

## 2020-07-07 RX ORDER — NICOTINE POLACRILEX 4 MG
15-30 LOZENGE BUCCAL
Status: DISCONTINUED | OUTPATIENT
Start: 2020-07-07 | End: 2020-07-14

## 2020-07-07 RX ORDER — DEXTROSE MONOHYDRATE 100 MG/ML
INJECTION, SOLUTION INTRAVENOUS CONTINUOUS PRN
Status: DISCONTINUED | OUTPATIENT
Start: 2020-07-07 | End: 2020-07-16 | Stop reason: HOSPADM

## 2020-07-07 RX ORDER — NICOTINE POLACRILEX 4 MG
15-30 LOZENGE BUCCAL
Status: DISCONTINUED | OUTPATIENT
Start: 2020-07-07 | End: 2020-07-10

## 2020-07-07 RX ORDER — DEXTROSE MONOHYDRATE 25 G/50ML
25-50 INJECTION, SOLUTION INTRAVENOUS
Status: DISCONTINUED | OUTPATIENT
Start: 2020-07-07 | End: 2020-07-10

## 2020-07-07 RX ADMIN — INSULIN ASPART 4 UNITS: 100 INJECTION, SOLUTION INTRAVENOUS; SUBCUTANEOUS at 08:38

## 2020-07-07 RX ADMIN — PANCRELIPASE 48000 UNITS: 24000; 76000; 120000 CAPSULE, DELAYED RELEASE PELLETS ORAL at 17:32

## 2020-07-07 RX ADMIN — MULTIVITAMIN 15 ML: LIQUID ORAL at 08:37

## 2020-07-07 RX ADMIN — PIPERACILLIN AND TAZOBACTAM 3.38 G: 3; .375 INJECTION, POWDER, FOR SOLUTION INTRAVENOUS at 16:52

## 2020-07-07 RX ADMIN — PANCRELIPASE 48000 UNITS: 24000; 76000; 120000 CAPSULE, DELAYED RELEASE PELLETS ORAL at 12:12

## 2020-07-07 RX ADMIN — SODIUM PHOSPHATE, MONOBASIC, MONOHYDRATE AND SODIUM PHOSPHATE, DIBASIC, ANHYDROUS 15 MMOL: 276; 142 INJECTION, SOLUTION INTRAVENOUS at 12:12

## 2020-07-07 RX ADMIN — HUMAN INSULIN 5.5 UNITS/HR: 100 INJECTION, SOLUTION SUBCUTANEOUS at 18:37

## 2020-07-07 RX ADMIN — OMEPRAZOLE 20 MG: 20 CAPSULE, DELAYED RELEASE ORAL at 08:38

## 2020-07-07 RX ADMIN — INSULIN ASPART 3 UNITS: 100 INJECTION, SOLUTION INTRAVENOUS; SUBCUTANEOUS at 03:32

## 2020-07-07 RX ADMIN — PIPERACILLIN AND TAZOBACTAM 3.38 G: 3; .375 INJECTION, POWDER, FOR SOLUTION INTRAVENOUS at 22:05

## 2020-07-07 RX ADMIN — OMEPRAZOLE 20 MG: 20 CAPSULE, DELAYED RELEASE ORAL at 16:52

## 2020-07-07 RX ADMIN — PIPERACILLIN AND TAZOBACTAM 3.38 G: 3; .375 INJECTION, POWDER, FOR SOLUTION INTRAVENOUS at 10:08

## 2020-07-07 RX ADMIN — PANCRELIPASE 48000 UNITS: 24000; 76000; 120000 CAPSULE, DELAYED RELEASE PELLETS ORAL at 07:11

## 2020-07-07 RX ADMIN — PIPERACILLIN AND TAZOBACTAM 3.38 G: 3; .375 INJECTION, POWDER, FOR SOLUTION INTRAVENOUS at 03:32

## 2020-07-07 ASSESSMENT — ACTIVITIES OF DAILY LIVING (ADL)
ADLS_ACUITY_SCORE: 17
ADLS_ACUITY_SCORE: 11
ADLS_ACUITY_SCORE: 11
AMBULATION: 0-->INDEPENDENT
TRANSFERRING: 0-->INDEPENDENT
BATHING: 0-->INDEPENDENT
ADLS_ACUITY_SCORE: 11
SWALLOWING: 0-->SWALLOWS FOODS/LIQUIDS WITHOUT DIFFICULTY
DRESS: 0-->INDEPENDENT
RETIRED_COMMUNICATION: 0-->UNDERSTANDS/COMMUNICATES WITHOUT DIFFICULTY
ADLS_ACUITY_SCORE: 17
COGNITION: 0 - NO COGNITION ISSUES REPORTED
TOILETING: 0-->INDEPENDENT
RETIRED_EATING: 0-->INDEPENDENT
ADLS_ACUITY_SCORE: 17
FALL_HISTORY_WITHIN_LAST_SIX_MONTHS: NO

## 2020-07-07 NOTE — PROGRESS NOTES
[facility], [place]    Progress Note - Mainor 2 Service        Date of Admission:  7/2/2020    Assessment & Plan   Alexandr Almonte is a 65 yo male with a history of HTN and complicated necrotizing pancreatitis with recent CT findings of a complex cystic structure and air within a worsening WON concerning for infection. He is presenting as a transfer from [facility] for surgical evaluation by GI for the pseudocyst on 07/02/20.     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 Most recent OSH CT imaging (6/28) shows progressive change with a large complex cystic structure in the mid abdomen and a worsening pancreatic pseudocyst with evidence of air concerning for infection. He is currently on Zosyn for suspected infection of the pseudocyst. GI evaluated the patient and suspects that the gas in the collection is due to fistula and that the pseudocyst is causing gastric outlet obstruction. EUS-guided cystogastrostomy with NJ tube placement (7/4), patient doing well. Will get repeat imaging on 7/9 with plan for necrosectomy on 7/10 pending findings.   - GI Panc/Bili consult, appreciate recs  - EUS-guided cystogastrostomy with NJ tube placement (7/4)  - Plan for repeat CT imaging on 7/9 with necrosectomy 7/10 pending findings.  - Dietician consulted, appreciate recs              - Creon 2-3 capsules of 24,000 plus PRN 2 capsules with snacks TID with meals              - Fecal elastase: pending              - Tube feeds via NJ at a rate of 60ml/hr, tolerating well              - Monitor mag and phos, replete as needed  -  Antibiotics:              - Continue Zosyn for next 5 days after OR procedure (7/5 - 7/9) for prophylaxis   - Omeprazole 20 mg PO BID  - CXR 7/6 showing small left pleural effusion and bibasilar subsegmental atelectasis, must improved from outside imaging      # SMV & Splenic vein thrombosis  2/2 The patient was initiated on warfarin and most recently on therapeutic dosing of lovenox. On the most recent CTA (6/28), there was no visualization of the thrombosis.   - Hold off resuming anticoagulation for at least 72 hours (until 7/7) after drainage and will decide on final AC duration based on repeat imaging on 7/9     # Pancreatogenic diabetes  Hgb 6.3 on admission. He was started on his pta dose of insulin glargine 14U BD, but continued to have elevated blood sugars so the dose was increased to 16U BD on 7/07 AM. He was then switched to insulin gtt with mealtime carb correction of 1:20 as sugars were still not adequately controlled. Will touch base with GI concerning the need for TFs if the patient is able to PO adequately - have calorie count for evaluation.  - insulin gtt  - Mealtime insulin of 1:20 carb correction  - Hypoglycemic protocol     # HTN  - hold home lisinopril 10mg daily  - monitor BPs     Diet:  Regular adult diet. TF at 60ml/hr  Fluids: None  Lines: PIV  DVT Prophylaxis: Pneumatic Compression Devices  Teresa Catheter: not present  Code Status:   Full Code    Disposition Plan   Expected discharge: 4 - 7 days, recommended to prior living arrangement once repeat imaging and surgical evaluation completed.   Entered: Aminah Zepeda MD 07/07/2020, 1:17 PM       The patient's care was discussed with the Attending Physician, Dr. Barrow.    Aminah Zepeda MD  [address], [place]  Pager: [phone]  Please see sticky note for cross cover information    Physician Attestation   I, Brian Barrow MD, saw this patient with the resident and agree  with the resident/fellow's findings and plan of care as documented in the note.      I personally reviewed vital signs, medications, labs, and imaging.    Key findings:   64yr male with care at [facility] in [place], [facility] in [place] (Not on Care Everywhere) for his necrotizing pancreatitis who is presenting with a large pseudocyst resulting in GOO to [facility] on 07/02/2020  Plan:   - Start insulin drip+ meal-time coverage. C  -Continue Abx and hold AC  - Plan for CT 7/9 and necrosectomy 7/10     Brian Barrow MD  Date of Service (when I saw the patient): 7/7/20    ______________________________________________________________________    Interval History   No acute events overnight, VSS and afebrile. Patient states that he feels well and has been walking laps in the hallway. He expresses annoyance with the NJ tube, but understands its purpose. Has transitioned to full diet, 1BM yesterday. Patient without abdominal pain, n/v, or headache. Phos low at 2.0, did replete.     4pt review of systems negative except as indicated in HPI.    Data reviewed today: I reviewed all medications, new labs and imaging results over the last 24 hours. I personally reviewed     Physical Exam   Vital Signs: Temp: 97.1  F (36.2  C) Temp src: Oral BP: 120/64 Pulse: 81 Heart Rate: 81 Resp: 16 SpO2: 96 % O2 Device: None (Room air)    Weight: 166 lbs 14.4 oz  GEN: resting comfortably in bed, was walking in tran earlier appears in no apparent distress  HEENT: nc/at. eomi, perrla. Oral mucosa moist and without lesion  CARDIAC: rrr no m/g/r.   LUNG: ctab no w/r/r. bilateral symmetric chest expansion.  ABDOMEN: soft, non-tender, moderately distended. +bs in 4 quadrants. No palpable organomegaly.  MSK/SKIN: skin warm and well-perfused. no rashes. 2+ bilateral pedal pulses. no lower extremity, pitting edema.   NEURO: alert and oriented x 3. no focal neurologic deficits. 5/5 bilateral motor strength.    Data   Reviewed.

## 2020-07-07 NOTE — PLAN OF CARE
VSS on RA. Denies nausea, denies pain. A&Ox4. NJ site WNL, taped to nose for comfort in addition to nasal bridge. Cont. TF running at 60mL/hr with Q4 water flushes, tolerating well. Regular diet, appetite good. Q4 blood sugars checked, sliding scale insulin given. L PIV TKO with intermittent Abx. Up ad bigg in room. Voiding spontaneously, Loose BM this shift, stool sample collected. Cont POC.

## 2020-07-07 NOTE — PLAN OF CARE
/57 (BP Location: Right arm)   Pulse 78   Temp 97.7  F (36.5  C) (Oral)   Resp 15   Wt 75.7 kg (166 lb 14.4 oz)   SpO2 94%   VSS on RA, A&Ox4, denies pain and nausea. Tolerating regular diet, TF running 60cc/hr, BGs checked and corrected Q4. Left PIV TKO in between antibiotics. NJ WNL. Passing gas, one BM overnight. Voiding spont, urinal bedside. Up ad bigg. Plan for imaging Thursday, necrosectomy Friday, and possible discharge this weekend. Continue POC.

## 2020-07-08 ENCOUNTER — APPOINTMENT (OUTPATIENT)
Dept: CT IMAGING | Facility: CLINIC | Age: 65
DRG: 405 | End: 2020-07-08
Attending: INTERNAL MEDICINE
Payer: OTHER GOVERNMENT

## 2020-07-08 LAB
ANION GAP SERPL CALCULATED.3IONS-SCNC: 6 MMOL/L (ref 3–14)
BUN SERPL-MCNC: 16 MG/DL (ref 7–30)
CALCIUM SERPL-MCNC: 9.3 MG/DL (ref 8.5–10.1)
CHLORIDE SERPL-SCNC: 103 MMOL/L (ref 94–109)
CO2 SERPL-SCNC: 24 MMOL/L (ref 20–32)
CREAT SERPL-MCNC: 0.67 MG/DL (ref 0.66–1.25)
ELASTASE PANC STL-MCNT: 147 UG/G
ERYTHROCYTE [DISTWIDTH] IN BLOOD BY AUTOMATED COUNT: 17 % (ref 10–15)
GFR SERPL CREATININE-BSD FRML MDRD: >90 ML/MIN/{1.73_M2}
GLUCOSE BLDC GLUCOMTR-MCNC: 105 MG/DL (ref 70–99)
GLUCOSE BLDC GLUCOMTR-MCNC: 108 MG/DL (ref 70–99)
GLUCOSE BLDC GLUCOMTR-MCNC: 117 MG/DL (ref 70–99)
GLUCOSE BLDC GLUCOMTR-MCNC: 132 MG/DL (ref 70–99)
GLUCOSE BLDC GLUCOMTR-MCNC: 136 MG/DL (ref 70–99)
GLUCOSE BLDC GLUCOMTR-MCNC: 139 MG/DL (ref 70–99)
GLUCOSE BLDC GLUCOMTR-MCNC: 140 MG/DL (ref 70–99)
GLUCOSE BLDC GLUCOMTR-MCNC: 140 MG/DL (ref 70–99)
GLUCOSE BLDC GLUCOMTR-MCNC: 141 MG/DL (ref 70–99)
GLUCOSE BLDC GLUCOMTR-MCNC: 146 MG/DL (ref 70–99)
GLUCOSE BLDC GLUCOMTR-MCNC: 147 MG/DL (ref 70–99)
GLUCOSE BLDC GLUCOMTR-MCNC: 152 MG/DL (ref 70–99)
GLUCOSE BLDC GLUCOMTR-MCNC: 160 MG/DL (ref 70–99)
GLUCOSE BLDC GLUCOMTR-MCNC: 204 MG/DL (ref 70–99)
GLUCOSE BLDC GLUCOMTR-MCNC: 205 MG/DL (ref 70–99)
GLUCOSE BLDC GLUCOMTR-MCNC: 88 MG/DL (ref 70–99)
GLUCOSE SERPL-MCNC: 134 MG/DL (ref 70–99)
HCT VFR BLD AUTO: 35 % (ref 40–53)
HGB BLD-MCNC: 10.6 G/DL (ref 13.3–17.7)
MAGNESIUM SERPL-MCNC: 2.1 MG/DL (ref 1.6–2.3)
MCH RBC QN AUTO: 28 PG (ref 26.5–33)
MCHC RBC AUTO-ENTMCNC: 30.3 G/DL (ref 31.5–36.5)
MCV RBC AUTO: 92 FL (ref 78–100)
PHOSPHATE SERPL-MCNC: 2.6 MG/DL (ref 2.5–4.5)
PLATELET # BLD AUTO: 453 10E9/L (ref 150–450)
POTASSIUM SERPL-SCNC: 4.2 MMOL/L (ref 3.4–5.3)
RBC # BLD AUTO: 3.79 10E12/L (ref 4.4–5.9)
SODIUM SERPL-SCNC: 134 MMOL/L (ref 133–144)
WBC # BLD AUTO: 12.5 10E9/L (ref 4–11)

## 2020-07-08 PROCEDURE — 25000132 ZZH RX MED GY IP 250 OP 250 PS 637: Performed by: INTERNAL MEDICINE

## 2020-07-08 PROCEDURE — 12000001 ZZH R&B MED SURG/OB UMMC

## 2020-07-08 PROCEDURE — 25000125 ZZHC RX 250: Performed by: STUDENT IN AN ORGANIZED HEALTH CARE EDUCATION/TRAINING PROGRAM

## 2020-07-08 PROCEDURE — 00000146 ZZHCL STATISTIC GLUCOSE BY METER IP

## 2020-07-08 PROCEDURE — 85027 COMPLETE CBC AUTOMATED: CPT | Performed by: HOSPITALIST

## 2020-07-08 PROCEDURE — 83735 ASSAY OF MAGNESIUM: CPT | Performed by: HOSPITALIST

## 2020-07-08 PROCEDURE — 25000132 ZZH RX MED GY IP 250 OP 250 PS 637: Performed by: STUDENT IN AN ORGANIZED HEALTH CARE EDUCATION/TRAINING PROGRAM

## 2020-07-08 PROCEDURE — 74177 CT ABD & PELVIS W/CONTRAST: CPT

## 2020-07-08 PROCEDURE — 84100 ASSAY OF PHOSPHORUS: CPT | Performed by: HOSPITALIST

## 2020-07-08 PROCEDURE — 99232 SBSQ HOSP IP/OBS MODERATE 35: CPT | Mod: GC | Performed by: HOSPITALIST

## 2020-07-08 PROCEDURE — 40000141 ZZH STATISTIC PERIPHERAL IV START W/O US GUIDANCE

## 2020-07-08 PROCEDURE — 25000128 H RX IP 250 OP 636: Performed by: INTERNAL MEDICINE

## 2020-07-08 PROCEDURE — 36415 COLL VENOUS BLD VENIPUNCTURE: CPT | Performed by: HOSPITALIST

## 2020-07-08 PROCEDURE — 80048 BASIC METABOLIC PNL TOTAL CA: CPT | Performed by: HOSPITALIST

## 2020-07-08 RX ORDER — IOPAMIDOL 755 MG/ML
101 INJECTION, SOLUTION INTRAVASCULAR ONCE
Status: COMPLETED | OUTPATIENT
Start: 2020-07-08 | End: 2020-07-08

## 2020-07-08 RX ADMIN — PANCRELIPASE 48000 UNITS: 24000; 76000; 120000 CAPSULE, DELAYED RELEASE PELLETS ORAL at 18:09

## 2020-07-08 RX ADMIN — PIPERACILLIN AND TAZOBACTAM 3.38 G: 3; .375 INJECTION, POWDER, FOR SOLUTION INTRAVENOUS at 04:55

## 2020-07-08 RX ADMIN — HUMAN INSULIN 6 UNITS/HR: 100 INJECTION, SOLUTION SUBCUTANEOUS at 23:04

## 2020-07-08 RX ADMIN — PIPERACILLIN AND TAZOBACTAM 3.38 G: 3; .375 INJECTION, POWDER, FOR SOLUTION INTRAVENOUS at 10:35

## 2020-07-08 RX ADMIN — OMEPRAZOLE 20 MG: 20 CAPSULE, DELAYED RELEASE ORAL at 18:09

## 2020-07-08 RX ADMIN — PANCRELIPASE 48000 UNITS: 24000; 76000; 120000 CAPSULE, DELAYED RELEASE PELLETS ORAL at 13:08

## 2020-07-08 RX ADMIN — HUMAN INSULIN 1.5 UNITS/HR: 100 INJECTION, SOLUTION SUBCUTANEOUS at 18:24

## 2020-07-08 RX ADMIN — PANCRELIPASE 48000 UNITS: 24000; 76000; 120000 CAPSULE, DELAYED RELEASE PELLETS ORAL at 08:21

## 2020-07-08 RX ADMIN — OMEPRAZOLE 20 MG: 20 CAPSULE, DELAYED RELEASE ORAL at 08:21

## 2020-07-08 RX ADMIN — MULTIVITAMIN 15 ML: LIQUID ORAL at 08:22

## 2020-07-08 RX ADMIN — HUMAN INSULIN 2 UNITS/HR: 100 INJECTION, SOLUTION SUBCUTANEOUS at 04:58

## 2020-07-08 RX ADMIN — IOPAMIDOL 101 ML: 755 INJECTION, SOLUTION INTRAVENOUS at 20:52

## 2020-07-08 RX ADMIN — HUMAN INSULIN 3 UNITS/HR: 100 INJECTION, SOLUTION SUBCUTANEOUS at 16:41

## 2020-07-08 ASSESSMENT — ACTIVITIES OF DAILY LIVING (ADL)
ADLS_ACUITY_SCORE: 11

## 2020-07-08 NOTE — PLAN OF CARE
Assumed care of pt from [id number]. AVSS on RA. Pt denies pain. Regular diet + TFs at goal rate of 60 mL/hr cont into NJ w/ programmed flushes. Denies nausea. Starting saroj cts tomorrow. Passing gas, having loose stools. Voiding, not saving. BGs into 300s and 400s today, started on an insulin gtt this PM. Carb coverage for meals also started today. Phos replaced, recheck sched for tomorrow AM. X1 PIV infusing insulin gtt, x1 PIV SL. UAL. Cont to monitor stools and BGs. Cont w/ POC.

## 2020-07-08 NOTE — PLAN OF CARE
Transfer from [facility] for surgical evaluation by GI for the pseudocyst on 07/02/20     Pain: denies.   Nausea: denies   Lab: Insulin gtt- now on  Algorithm 3, Q2 hour checks, BS Q2- 140, 132, 140, 205  /68 (BP Location: Right arm)   Pulse 85   Temp 96.5  F (35.8  C) (Oral)   Resp 16   Wt 75.7 kg (166 lb 14.4 oz)   SpO2 95%   Output: Voiding, not saving   Diet: Regular +  tube feeds @ 60 ml/hr, tolerating well   Activity: UAL   Bowel Function: Bowel sounds heard in all quadrants, passing flatus, bm x1 this shift.   Lines: L PIV x 2 , infusing TKO in between abx, dressing CDI   Tubes: NJ in place, tube feeds at 60 ml/hr   Skin: L posterior heel resolving wound  Plan: Continue to monitor pain, nausea, VS, output, and bowel function. Plan for repeat CT imaging on 7/9 with necrosectomy 7/10 pending findings per MD note

## 2020-07-08 NOTE — PROGRESS NOTES
GASTROENTEROLOGY PROGRESS NOTE    Date: 07/08/2020     ASSESSMENT:  64-year-old man with a history of idiopathic enterocolitis and recent hospital stay in April complicated by ileus, SMV thrombosis, and splenic vein thrombosis.  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  There was possibility of air inside the pseudocyst which was thought either to be due to visualization of pseudocyst with bowel or an infection.  He underwent EUS guided transluminal drainage (cystgastrostomy) with placement of 15 mm AXIOS stent as well as 10 Uzbek by 3 cm double-pigtail Solus stent on 7/2.      Summary:  -- Etiology: Idiopathic  -- Date of onset: 4/28  -- BISAP score: Not available  -- Fluid collection               -- Infected:  Possibility due to presence of air               -- Abx: Zosyn (completed)  -- Concurrent organ failure: N/A  -- Nutrition:    --NJ tube: Placed during procedure on 7/2               -- GJ Tube: N/A               -- PERT: Y  -- Necrosectomy- initial drainage on 7/2  -- Next Necrosectomy: Being planned for 7/10  -- Last CT: 6/28  -- Interventions:  Initial drainage 7/2  -- Drains: N/A  -- Thrombosis:  Known SMV and splenic vein thrombus, not on anticoagulation  -- Surgery consult: N    RECOMMENDATIONS:  - Plan for repeat CT abdomen pelvis with contrast on Thursday.  Will review and likely will need necrosectomy on Friday (if able, we can get this CT this evening as well)  - Continue tube feeds through NJ  - Continue to take PO intake. Patient continues to tolerate this well without any early satiety, nausea, or vomiting  - OK to discontinue antibiotics (has been on 5 days post procedure). Has mild leukocytosis, would trend this.   - please hold any anticoagulation for anticipation of procedure this Friday    Gastroenterology follow up recommendations: Pending clinical course.      Thank you for involving us in this patient's care. Please do not hesitate  to contact the GI service with any questions or concerns.      Pt care plan discussed with Dr. Ross, GI staff physician.    Kwesi SYED MD  Gastroenterology Fellow  Division of Gastroenterology, Hepatology and Nutrition  [facility]    _______________________________________________________________    24 Hour Events:  No acute events overnight    Subjective:  Patient feels well overall  Denies any chest pain, shortness of breath  Denies any abdominal pain, nausea, vomiting.  Does feel some fullness in the abdomen.  No blood in the stool or black stools.    Objective:  Blood pressure 118/68, pulse 88, temperature 98.3  F (36.8  C), temperature source Oral, resp. rate 16, weight 75.7 kg (166 lb 14.4 oz), SpO2 95 %.    Gen: A&Ox3, NAD  HEENT: ncat, perrl, eomi, sclera anicteric.  NG tube in place.  CV: RRR  Lungs: CTA b/l  Abd:  +bs, soft, nd/nt  Skin: no jaundice, no stigmata of chronic liver disease  MS: appropriate muscle mass for age  Neuro: non focal       LABS:  BMP  Recent Labs   Lab 07/08/20  0643 07/07/20  0712 07/06/20  0614 07/05/20  0702    134 137 135   POTASSIUM 4.2 4.5 3.9 4.1   CHLORIDE 103 101 104 104   BARAK 9.3 8.6 8.0* 8.2*   CO2 24 26 30 25   BUN 16 15 8 6*   CR 0.67 0.65* 0.67 0.66   * 290* 178* 192*     CBC  Recent Labs   Lab 07/08/20  0643 07/06/20  0614 07/05/20  0702 07/04/20  0653   WBC 12.5* 6.5 6.1 9.8   RBC 3.79* 3.19* 3.39* 3.85*   HGB 10.6* 9.0* 9.4* 10.7*   HCT 35.0* 29.8* 31.6* 35.9*   MCV 92 93 93 93   MCH 28.0 28.2 27.7 27.8   MCHC 30.3* 30.2* 29.7* 29.8*   RDW 17.0* 16.9* 16.6* 16.7*   * 313 305 443     INR  Recent Labs   Lab 07/04/20  0002 07/02/20 2105   INR 1.18* 1.19*     LFTs  Recent Labs   Lab 07/05/20  0702 07/04/20  0653 07/03/20  0721 07/02/20  2105   ALKPHOS 316* 471* 409* 430*   AST 18 32 23 21   ALT 18 25 23 26   BILITOTAL 0.5 1.2 1.1 0.9   PROTTOTAL 6.4* 7.7 7.1 7.3   ALBUMIN 1.8* 2.2* 2.2* 2.2*      PANCNo lab results found in  last 7 days.    IMAGING:  reviewed

## 2020-07-08 NOTE — PLAN OF CARE
[id number] Insulin drip infusing, on algorithm 2. BG checks q2h. Carb coverage insulin with meals and snacks. Denies pain and nausea. On IV Zosyn. Tube feeds infusing at goal of 60 mL/hr via NJ. Passing flatus, no BM. Voiding spontaneously adequate amounts. On regular diet, calorie counts initiated. No PO intake overnight. Up ad bigg. Temp max 99.1, VSS on room air. Plans for repeat imaging on 7/9 and next necrosectomy on 7/10.

## 2020-07-08 NOTE — PLAN OF CARE
D/A/I:  Patient A&O x4, denied pain, palpitations, difficulty breathing, SOB, dizziness, and nausea.  Lung sounds clear to auscultation, no abnormal heart sounds noted.  Bowel sounds normoactive.  HR 90s, SBP 110s, SaO2 95% on room air.  Tube feeding continued at 60 ml/hr via NJ tube with 60 ml flushes q4hr.  Insulin infusion decreased to 1.5 units/hr in Algorithm 3.  Ambulated in hallway independently with steady gait.  Took shower this afternoon.    P:  Continue to monitor pain, VS, NJ function, skin integrity, fluid status, bowel status, cardiac and respiratory status.  Notify care team of changes in patient condition or other concerns.  Expected to have abdominal CT this evening when PIV access is established.  Potential necrosectomy 7/10.

## 2020-07-08 NOTE — PROGRESS NOTES
[facility], [place]    Progress Note - Mainor 2 Service        Date of Admission:  7/2/2020    Assessment & Plan   Alexandr Almonte is a 63 yo male with a history of HTN and complicated necrotizing pancreatitis with recent CT findings of a complex cystic structure and air within a worsening WON concerning for infection. He is presenting as a transfer from [facility] for surgical evaluation by GI for the pseudocyst on 07/02/20.     Change today:  - Mild leukocytosis to 12.5 up from 6.5 on 7/6. 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 Most recent OSH CT imaging (6/28) shows progressive change with a large complex cystic structure in the mid abdomen and a worsening pancreatic pseudocyst with evidence of air concerning for infection. He is currently on Zosyn for suspected infection of the pseudocyst. GI evaluated the patient and suspects that the gas in the collection is due to fistula and that the pseudocyst is causing gastric outlet obstruction. EUS-guided cystogastrostomy with NJ tube placement (7/4), patient doing well. Will get repeat imaging on 7/9 with plan for necrosectomy on 7/10 pending findings.   - GI Panc/Bili consult, appreciate recs  - EUS-guided cystogastrostomy with NJ tube placement (7/4)  - Plan for repeat CT imaging on 7/9 with necrosectomy 7/10 pending findings.  - Dietician consulted, appreciate recs              - Creon 2-3 capsules of 24,000 plus PRN 2 capsules with snacks TID with  meals              - Fecal elastase: pending              - Tube feeds via NJ at a rate of 60ml/hr, tolerating well              - Monitor mag and phos, replete as needed  - Antibiotics:              - Continue Zosyn for 5 days after OR procedure (7/5 - 7/9) for prophylaxis   - Nutrition: calorie counts in place, GI ok to discontinue TF if can adequately PO the below goals per nutritionist   - Energy needs: 4946-8763+ kcals/day   - Protein needs: 98-113g prtn/day  - Omeprazole 20 mg PO BID  - CXR 7/6 showing small left pleural effusion and bibasilar subsegmental atelectasis, must improved from outside imaging      # SMV & Splenic vein thrombosis  2/2 The patient was initiated on warfarin and most recently on therapeutic dosing of lovenox. On the most recent CTA (6/28), there was no visualization of the thrombosis.   - Hold off resuming anticoagulation for at least 72 hours (until 7/7) after drainage and will decide on final AC duration based on repeat imaging on 7/9     # Pancreatogenic diabetes  Hgb 6.3 on admission. He was started on his pta dose of insulin glargine 14U BD, but continued to have elevated blood sugars so the dose was increased to 16U BD on 7/07 AM. He was then switched to insulin gtt with mealtime carb correction of 1:20 as sugars were still not adequately controlled. Will touch base with GI concerning the need for TFs if the patient is able to PO adequately - have calorie count for evaluation.  - insulin gtt  - Mealtime insulin of 1:20 carb correction  - Hypoglycemic protocol     # HTN  - hold home lisinopril 10mg daily  - monitor BPs     Diet:  Regular adult diet. TF at 60ml/hr  Fluids: None  Lines: PIV  DVT Prophylaxis: Pneumatic Compression Devices  Teresa Catheter: not present  Code Status:   Full Code    Disposition Plan   Expected discharge: 2 - 3 days, recommended to prior living arrangement once repeat imagine and GI evaluation complete.  Entered: Aminah Zepeda MD 07/08/2020, 11:11  AM       The patient's care was discussed with the Attending Physician, Dr. Barrow.    Aminah Zepeda MD  [facility] 2 Service  [facility] [place]  Pager: [phone]  Please see sticky note for cross cover information  ______________________________________________________________________    Interval History   No acute event overnight, VSS and afebrile. NJ tube in place. Patient has no complaints this morning and states that he feels less bloated. No abdominal pain, n/v, or diarrhea. Has been walking laps in the tran and feels well.     4pt review of systems negative except as indicated in HPI.    Data reviewed today: I reviewed all medications, new labs and imaging results over the last 24 hours.     Physical Exam   Vital Signs: Temp: 98.3  F (36.8  C) Temp src: Oral BP: 118/68 Pulse: 88 Heart Rate: 88 Resp: 16 SpO2: 95 % O2 Device: None (Room air)    Weight: 166 lbs 14.4 oz  GEN: resting comfortably in the chair watching television, appears in no apparent distress  HEENT: nc/at. eomi, perrla. Moist oral mucosa  CARDIAC: rrr no m/g/r.   LUNG: ctab no w/r/r. bilateral symmetric chest expansion.  ABDOMEN: soft, mildly distended, non tender to palpation in all quadrants. +bs. No palpable organomegaly   MSK/SKIN: skin warm and well-perfused. no rashes. 2+ bilateral pedal pulses. no lower extremity, pitting edema.   NEURO: alert and oriented x 3. no focal neurologic deficits. 5/5 bilateral motor strength.    Data   Reviewed.

## 2020-07-09 ENCOUNTER — ANESTHESIA EVENT (OUTPATIENT)
Dept: MEDSURG UNIT | Facility: CLINIC | Age: 65
End: 2020-07-09

## 2020-07-09 LAB
ALBUMIN SERPL-MCNC: 2.6 G/DL (ref 3.4–5)
ALP SERPL-CCNC: 341 U/L (ref 40–150)
ALT SERPL W P-5'-P-CCNC: 45 U/L (ref 0–70)
ANION GAP SERPL CALCULATED.3IONS-SCNC: 4 MMOL/L (ref 3–14)
AST SERPL W P-5'-P-CCNC: 56 U/L (ref 0–45)
BILIRUB SERPL-MCNC: 0.4 MG/DL (ref 0.2–1.3)
BUN SERPL-MCNC: 22 MG/DL (ref 7–30)
CALCIUM SERPL-MCNC: 9.1 MG/DL (ref 8.5–10.1)
CHLORIDE SERPL-SCNC: 102 MMOL/L (ref 94–109)
CO2 SERPL-SCNC: 27 MMOL/L (ref 20–32)
CREAT SERPL-MCNC: 0.6 MG/DL (ref 0.66–1.25)
ERYTHROCYTE [DISTWIDTH] IN BLOOD BY AUTOMATED COUNT: 17.3 % (ref 10–15)
GFR SERPL CREATININE-BSD FRML MDRD: >90 ML/MIN/{1.73_M2}
GLUCOSE BLDC GLUCOMTR-MCNC: 100 MG/DL (ref 70–99)
GLUCOSE BLDC GLUCOMTR-MCNC: 109 MG/DL (ref 70–99)
GLUCOSE BLDC GLUCOMTR-MCNC: 110 MG/DL (ref 70–99)
GLUCOSE BLDC GLUCOMTR-MCNC: 115 MG/DL (ref 70–99)
GLUCOSE BLDC GLUCOMTR-MCNC: 115 MG/DL (ref 70–99)
GLUCOSE BLDC GLUCOMTR-MCNC: 119 MG/DL (ref 70–99)
GLUCOSE BLDC GLUCOMTR-MCNC: 119 MG/DL (ref 70–99)
GLUCOSE BLDC GLUCOMTR-MCNC: 126 MG/DL (ref 70–99)
GLUCOSE BLDC GLUCOMTR-MCNC: 127 MG/DL (ref 70–99)
GLUCOSE BLDC GLUCOMTR-MCNC: 132 MG/DL (ref 70–99)
GLUCOSE BLDC GLUCOMTR-MCNC: 134 MG/DL (ref 70–99)
GLUCOSE BLDC GLUCOMTR-MCNC: 134 MG/DL (ref 70–99)
GLUCOSE BLDC GLUCOMTR-MCNC: 135 MG/DL (ref 70–99)
GLUCOSE BLDC GLUCOMTR-MCNC: 136 MG/DL (ref 70–99)
GLUCOSE BLDC GLUCOMTR-MCNC: 143 MG/DL (ref 70–99)
GLUCOSE BLDC GLUCOMTR-MCNC: 143 MG/DL (ref 70–99)
GLUCOSE BLDC GLUCOMTR-MCNC: 146 MG/DL (ref 70–99)
GLUCOSE BLDC GLUCOMTR-MCNC: 148 MG/DL (ref 70–99)
GLUCOSE BLDC GLUCOMTR-MCNC: 150 MG/DL (ref 70–99)
GLUCOSE BLDC GLUCOMTR-MCNC: 157 MG/DL (ref 70–99)
GLUCOSE BLDC GLUCOMTR-MCNC: 158 MG/DL (ref 70–99)
GLUCOSE BLDC GLUCOMTR-MCNC: 162 MG/DL (ref 70–99)
GLUCOSE BLDC GLUCOMTR-MCNC: 176 MG/DL (ref 70–99)
GLUCOSE BLDC GLUCOMTR-MCNC: 87 MG/DL (ref 70–99)
GLUCOSE SERPL-MCNC: 119 MG/DL (ref 70–99)
HCT VFR BLD AUTO: 35.9 % (ref 40–53)
HGB BLD-MCNC: 10.8 G/DL (ref 13.3–17.7)
MCH RBC QN AUTO: 27.7 PG (ref 26.5–33)
MCHC RBC AUTO-ENTMCNC: 30.1 G/DL (ref 31.5–36.5)
MCV RBC AUTO: 92 FL (ref 78–100)
PLATELET # BLD AUTO: 429 10E9/L (ref 150–450)
POTASSIUM SERPL-SCNC: 4 MMOL/L (ref 3.4–5.3)
PROT SERPL-MCNC: 8 G/DL (ref 6.8–8.8)
RBC # BLD AUTO: 3.9 10E12/L (ref 4.4–5.9)
SODIUM SERPL-SCNC: 133 MMOL/L (ref 133–144)
WBC # BLD AUTO: 11.7 10E9/L (ref 4–11)

## 2020-07-09 PROCEDURE — 36415 COLL VENOUS BLD VENIPUNCTURE: CPT | Performed by: STUDENT IN AN ORGANIZED HEALTH CARE EDUCATION/TRAINING PROGRAM

## 2020-07-09 PROCEDURE — 27210437 ZZH NUTRITION PRODUCT SEMIELEM INTERMED LITER

## 2020-07-09 PROCEDURE — 25000132 ZZH RX MED GY IP 250 OP 250 PS 637: Performed by: INTERNAL MEDICINE

## 2020-07-09 PROCEDURE — 99232 SBSQ HOSP IP/OBS MODERATE 35: CPT | Mod: GC | Performed by: INTERNAL MEDICINE

## 2020-07-09 PROCEDURE — 00000146 ZZHCL STATISTIC GLUCOSE BY METER IP

## 2020-07-09 PROCEDURE — 25000132 ZZH RX MED GY IP 250 OP 250 PS 637: Performed by: STUDENT IN AN ORGANIZED HEALTH CARE EDUCATION/TRAINING PROGRAM

## 2020-07-09 PROCEDURE — 85027 COMPLETE CBC AUTOMATED: CPT | Performed by: STUDENT IN AN ORGANIZED HEALTH CARE EDUCATION/TRAINING PROGRAM

## 2020-07-09 PROCEDURE — 80053 COMPREHEN METABOLIC PANEL: CPT | Performed by: STUDENT IN AN ORGANIZED HEALTH CARE EDUCATION/TRAINING PROGRAM

## 2020-07-09 PROCEDURE — 12000001 ZZH R&B MED SURG/OB UMMC

## 2020-07-09 PROCEDURE — 25000125 ZZHC RX 250: Performed by: STUDENT IN AN ORGANIZED HEALTH CARE EDUCATION/TRAINING PROGRAM

## 2020-07-09 RX ADMIN — PANCRELIPASE 48000 UNITS: 24000; 76000; 120000 CAPSULE, DELAYED RELEASE PELLETS ORAL at 19:34

## 2020-07-09 RX ADMIN — PANCRELIPASE 48000 UNITS: 24000; 76000; 120000 CAPSULE, DELAYED RELEASE PELLETS ORAL at 14:08

## 2020-07-09 RX ADMIN — PANCRELIPASE 48000 UNITS: 24000; 76000; 120000 CAPSULE, DELAYED RELEASE PELLETS ORAL at 07:37

## 2020-07-09 RX ADMIN — OMEPRAZOLE 20 MG: 20 CAPSULE, DELAYED RELEASE ORAL at 16:14

## 2020-07-09 RX ADMIN — OMEPRAZOLE 20 MG: 20 CAPSULE, DELAYED RELEASE ORAL at 07:38

## 2020-07-09 RX ADMIN — HUMAN INSULIN 6 UNITS/HR: 100 INJECTION, SOLUTION SUBCUTANEOUS at 16:10

## 2020-07-09 RX ADMIN — MULTIVITAMIN 15 ML: LIQUID ORAL at 07:38

## 2020-07-09 RX ADMIN — PANCRELIPASE 48000 UNITS: 24000; 76000; 120000 CAPSULE, DELAYED RELEASE PELLETS ORAL at 17:54

## 2020-07-09 RX ADMIN — HUMAN INSULIN 4 UNITS/HR: 100 INJECTION, SOLUTION SUBCUTANEOUS at 09:46

## 2020-07-09 ASSESSMENT — ACTIVITIES OF DAILY LIVING (ADL)
ADLS_ACUITY_SCORE: 11

## 2020-07-09 ASSESSMENT — PAIN DESCRIPTION - DESCRIPTORS: DESCRIPTORS: DISCOMFORT

## 2020-07-09 NOTE — PROGRESS NOTES
"SPIRITUAL HEALTH SERVICES  [facility] 7C     REFERRAL SOURCE: HEENA  Introduced spiritual health services to Pt, Mateus who was lying in bed and stated that he was feeling \"better.\" He shares that he has \"more surgery tomorrow \" and then expresses hope about \"getting back to life at home\"  as his healing progresses. He had no spiritual health needs at this time.      PLAN:  remains available per pt/family/staff request.     Rev. Paula Treviño MDiv, [facility]  Staff    Pager [phone]  * [facility] remains available 24/7 for emergent requests/referrals, either by having the switchboard page the on-call  or by entering an ASAP/STAT consult in Epic (this will also page the on-call ).*        "

## 2020-07-09 NOTE — PLAN OF CARE
A&O x4, denied pain, SOB, dizziness, and nausea.  Lung sounds clear. Bowel sounds normoactive. BM today. VSS on RA. Tube feeding continued at 60 ml/hr via NJ tube with 60 ml flushes q4hr.  Insulin infusion decreased to 6.0 units/hr in Algorithm 4.  Ambulates in hallway independently with steady gait.   CT this evening-results pending.     Cont to observe and follow POC.    Potential necrosectomy 7/10.

## 2020-07-09 NOTE — ANESTHESIA PREPROCEDURE EVALUATION
Anesthesia Pre-Procedure Evaluation    Patient: Norberto Almonte   MRN:     [id number] Gender:   male   Age:    64 year old :      1955        Preoperative Diagnosis: Necrotizing pancreatitis [K85.91]   Procedure(s):  ESOPHAGOGASTRODUODENOSCOPY, WITH TRANSENDOSCOPIC STENT INSERTION, placement of Nasojejunal tube  ESOPHAGOGASTRODUODENOSCOPY, Necrosectomy     LABS:  CBC:   Lab Results   Component Value Date    WBC 11.7 (H) 2020    WBC 12.5 (H) 2020    HGB 10.8 (L) 2020    HGB 10.6 (L) 2020    HCT 35.9 (L) 2020    HCT 35.0 (L) 2020     2020     (H) 2020     BMP:   Lab Results   Component Value Date     2020     2020    POTASSIUM 4.0 2020    POTASSIUM 4.2 2020    CHLORIDE 102 2020    CHLORIDE 103 2020    CO2 27 2020    CO2 24 2020    BUN 22 2020    BUN 16 2020    CR 0.60 (L) 2020    CR 0.67 2020     (H) 2020     (H) 2020     COAGS:   Lab Results   Component Value Date    INR 1.18 (H) 2020     POC:   Lab Results   Component Value Date     (H) 2020     OTHER:   Lab Results   Component Value Date    A1C 6.3 (H) 2020    BARAK 9.1 2020    PHOS 2.6 2020    MAG 2.1 2020    ALBUMIN 2.6 (L) 2020    PROTTOTAL 8.0 2020    ALT 45 2020    AST 56 (H) 2020    ALKPHOS 341 (H) 2020    BILITOTAL 0.4 2020    .0 (H) 2020        Preop Vitals    BP Readings from Last 3 Encounters:   20 126/67    Pulse Readings from Last 3 Encounters:   20 93      Resp Readings from Last 3 Encounters:   20 16    SpO2 Readings from Last 3 Encounters:   20 98%      Temp Readings from Last 1 Encounters:   20 36.5  C (97.7  F) (Oral)    Ht Readings from Last 1 Encounters:   No data found for Ht      Wt Readings from Last 1 Encounters:   20 72.5 kg (159 lb 14.4  oz)    There is no height or weight on file to calculate BMI.     LDA:  Peripheral IV 07/07/20 Left;Posterior Lower forearm (Active)   Site Assessment [facility] 07/09/20 0745   Line Status Infusing 07/09/20 0745   Phlebitis Scale 0-->no symptoms 07/09/20 0745   Infiltration Scale 0 07/09/20 0745   Infiltration Site Treatment Method  None 07/09/20 0745   Extravasation? No 07/09/20 0745   Number of days: 2       Peripheral IV 07/08/20 Right;Anterior Lower forearm (Active)   Site Assessment [facility] 07/09/20 0745   Line Status Saline locked 07/09/20 0745   Phlebitis Scale 0-->no symptoms 07/09/20 0745   Infiltration Scale 0 07/09/20 0745   Infiltration Site Treatment Method  None 07/09/20 0745   Extravasation? No 07/09/20 0745   Number of days: 1       NG/OG/NJ Tube Nasojejunal 12 fr Left nostril (Active)   Site Description [facility] 07/09/20 0745   Status Enteral Feedings 07/09/20 0745   [place] (cm marking) at nare/mouth 99 cm 07/08/20 1600   Intake (ml) 15 ml 07/09/20 0745   Flush/Free Water (mL) 30 mL 07/09/20 0745   Number of days: 5        History reviewed. No pertinent past medical history.   Past Surgical History:   Procedure Laterality Date     ENDOSCOPIC ULTRASOUND, ESOPHAGOSCOPY, GASTROSCOPY, DUODENOSCOPY (EGD), NECROSECTOMY N/A 7/4/2020    Procedure: Endoscopic ultrasound with cyst gastrostomy, dilation and stent placement, nasojejunal feeding tube placement;  Surgeon: César Almaguer MD;  Location: [facility] OR     INSERT TUBE NASOJEJUNOSTOMY  7/4/2020    Procedure: Insert tube nasojejunostomy;  Surgeon: César Almaguer MD;  Location: [facility] OR      No Known Allergies     Anesthesia Evaluation     .             ROS/MED HX    ENT/Pulmonary:       Neurologic: Comment: Acute toxic encephalopathy at outside hospital admission    (+)delerium resolved Yes,     Cardiovascular:     (+) hypertension----. : . . . :. .       METS/Exercise Tolerance:     Hematologic: Comments: Superior mesenteric vein and SMV thromboses, on coumadin        (+)  History of blood clots pt is anticoagulated, Anemia, -      Musculoskeletal:         GI/Hepatic: Comment: Pancreatic insufficiency     (+) Other GI/Hepatic acute necrotizing pancreatitis      Renal/Genitourinary: Comment: HARRIS at outside hospital, normal labs on admission yesterday    (+) chronic renal disease, Pt does not require dialysis,       Endo:     (+) type II DM .      Psychiatric:         Infectious Disease:         Malignancy:         Other:                     JZG FV AN PHYSICAL EXAM    Assessment:   ASA SCORE: 3    H&P: History and physical reviewed and following examination; no interval change.   Smoking Status:  Non-Smoker/Unknown        Plan:   Anes. Type:  General   Pre-Medication: None   Induction:  IV (Standard)   Airway: ETT; Oral   Access/Monitoring: PIV   Maintenance: Balanced     Postop Plan:   Postop Pain: Opioids  Postop Sedation/Airway: Not planned     PONV Management:   Adult Risk Factors:, Non-Smoker, Postop Opioids                   Blanco Gill MD

## 2020-07-09 NOTE — PROGRESS NOTES
"Transition Planning Update    D:   Pt declined [facility] appointment that was scheduled today to initiate education about home enteral feeding in anticipation that Mr. Almonte may need home enteral feeding once stable for discharge after surgery.   7C staff stated that patient stated the following when updated about his [facility] appointment for today,  \"I told the doctors that I am not going home with feeding through a tube.   The [facility] Liaison will be updated about the above and will be asked to keep patient in a pending status in the event he will need the enteral feeding in his home area of [place].  Mr. Almonte will also need transport back to his home area of [place] as patient was admitted  via ambulance from [facility]. Patient has  [employer] Insurance and from a representative from [facility], patient has no transportation benefits. Transport to [place] TBD.  A/P:  Inpatient cares continue per MD orders.  Plan is for necroasectomy tomorrow. Inpt CM RN will follow.    ADEEL Gilliam.S.NANCY., R.N., P.H.N..  Care Coordinator     Pager   [facility]        "

## 2020-07-09 NOTE — PROGRESS NOTES
GASTROENTEROLOGY PROGRESS NOTE    Date: 07/09/2020     ASSESSMENT:  64-year-old man with a history of idiopathic enterocolitis and recent hospital stay in April complicated by ileus, SMV thrombosis, and splenic vein thrombosis.  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  There was possibility of air inside the pseudocyst which was thought either to be due to visualization of pseudocyst with bowel or an infection.  He underwent EUS guided transluminal drainage (cystgastrostomy) with placement of 15 mm AXIOS stent as well as 10 Frisian by 3 cm double-pigtail Solus stent on 7/2.      Summary:  -- Etiology: Idiopathic  -- Date of onset: 4/28  -- BISAP score: Not available  -- Fluid collection               -- Infected:  Possibility due to presence of air               -- Abx: Zosyn (completed)  -- Concurrent organ failure: N/A  -- Nutrition:    --NJ tube: Placed during procedure on 7/2               -- GJ Tube: N/A               -- PERT: Y  -- Necrosectomy- initial drainage on 7/2  -- Next Necrosectomy: Being planned for 7/10  -- Last CT: 6/28  -- Interventions:  Initial drainage 7/2  -- Drains: N/A  -- Thrombosis:  Known SMV and splenic vein thrombus, not on anticoagulation  -- Surgery consult: N    RECOMMENDATIONS:  -Plan for necrosectomy tomorrow.  After this procedure tomorrow, will reassess the patient, may be able to discharge the next day or two with outpatient follow-up for additional necrosectomies.  GI will arrange this  -Please keep patient on clear liquid diet all day today, n.p.o. at midnight  -Avoid any anticoagulation      Gastroenterology follow up recommendations: Pending clinical course.      Thank you for involving us in this patient's care. Please do not hesitate to contact the GI service with any questions or concerns.      Pt care plan discussed with Dr. Ross, GI staff physician.    Kwesi SYED MD  Gastroenterology Fellow  Division of  Gastroenterology, Hepatology and Nutrition  [facility]    _______________________________________________________________    24 Hour Events:  No acute events overnight    Subjective:  Patient feels well overall  Denies any chest pain, shortness of breath  Denies any abdominal pain, nausea, vomiting.  Does feel some fullness in the abdomen.  No blood in the stool or black stools.  Tolerating p.o. intake    Objective:  Blood pressure 120/68, pulse 93, temperature 96.7  F (35.9  C), temperature source Oral, resp. rate 16, weight 72.5 kg (159 lb 14.4 oz), SpO2 98 %.    Gen: A&Ox3, NAD  HEENT: ncat, perrl, eomi, sclera anicteric.  NG tube in place.  CV: RRR  Lungs: CTA b/l  Abd:  +bs, soft, nd/nt  Skin: no jaundice, no stigmata of chronic liver disease  MS: appropriate muscle mass for age  Neuro: non focal       LABS:  BMP  Recent Labs   Lab 07/09/20  0731 07/08/20  0643 07/07/20  0712 07/06/20  0614    134 134 137   POTASSIUM 4.0 4.2 4.5 3.9   CHLORIDE 102 103 101 104   BARAK 9.1 9.3 8.6 8.0*   CO2 27 24 26 30   BUN 22 16 15 8   CR 0.60* 0.67 0.65* 0.67   * 134* 290* 178*     CBC  Recent Labs   Lab 07/09/20  0731 07/08/20  0643 07/06/20  0614 07/05/20  0702   WBC 11.7* 12.5* 6.5 6.1   RBC 3.90* 3.79* 3.19* 3.39*   HGB 10.8* 10.6* 9.0* 9.4*   HCT 35.9* 35.0* 29.8* 31.6*   MCV 92 92 93 93   MCH 27.7 28.0 28.2 27.7   MCHC 30.1* 30.3* 30.2* 29.7*   RDW 17.3* 17.0* 16.9* 16.6*    453* 313 305     INR  Recent Labs   Lab 07/04/20  0002 07/02/20  2105   INR 1.18* 1.19*     LFTs  Recent Labs   Lab 07/09/20  0731 07/05/20  0702 07/04/20  0653 07/03/20  0721   ALKPHOS 341* 316* 471* 409*   AST 56* 18 32 23   ALT 45 18 25 23   BILITOTAL 0.4 0.5 1.2 1.1   PROTTOTAL 8.0 6.4* 7.7 7.1   ALBUMIN 2.6* 1.8* 2.2* 2.2*      PANCNo lab results found in last 7 days.    IMAGING:  reviewed

## 2020-07-09 NOTE — PROGRESS NOTES
[facility], [place]    Progress Note - Mainor 2 Service        Date of Admission:  7/2/2020    Assessment & Plan   Alexandr Almonte is a 63 yo male with a history of HTN and complicated necrotizing pancreatitis with recent CT findings of a complex cystic structure and air within a worsening WON concerning for infection. He is presenting as a transfer from [facility] for surgical evaluation by GI for the pseudocyst on 07/02/20.     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 Most recent OSH CT imaging (6/28) shows progressive change with a large complex cystic structure in the mid abdomen and a worsening pancreatic pseudocyst with evidence of air concerning for infection. He is currently on Zosyn for suspected infection of the pseudocyst. GI evaluated the patient and suspects that the gas in the collection is due to fistula and that the pseudocyst is causing gastric outlet obstruction. EUS-guided cystogastrostomy with NJ tube placement (7/4), patient doing well. Will get repeat imaging on 7/9 with plan for necrosectomy on 7/10 pending findings.   - GI Panc/Bili consult, appreciate recs  - EUS-guided cystogastrostomy with NJ tube placement (7/4)  - Plan for repeat CT imaging on 7/9 with necrosectomy 7/10 pending findings.  - Dietician consulted, appreciate recs              - Creon 2-3 capsules of 24,000 plus PRN 2 capsules with snacks TID with meals              - Fecal elastase:  pending              - Tube feeds via NJ at a rate of 60ml/hr, tolerating well              - Monitor mag and phos, replete as needed  - Antibiotics:              - Continue Zosyn for 5 days after OR procedure (7/5 - 7/9) for prophylaxis   - Nutrition: calorie counts in place, GI ok to discontinue TF if can adequately PO the below goals per nutritionist   - Energy needs: 4531-8416+ kcals/day   - Protein needs: 98-113g prtn/day  - Intervention:   - Endoscopic necrosectomy 7/4   - Plan repeat endoscopic necrosectomy on 7/11  - Omeprazole 20 mg PO BID  - CXR 7/6 showing small left pleural effusion and bibasilar subsegmental atelectasis, must improved from outside imaging      # SMV & Splenic vein thrombosis  2/2 The patient was initiated on warfarin and most recently on therapeutic dosing of lovenox. On the most recent CTA (6/28), there was no visualization of the thrombosis. No thrombosis was seen on repeat CT (7/9)  - Hold off resuming anticoagulation for now     # Pancreatogenic diabetes  Hgb 6.3 on admission. He was started on his pta dose of insulin glargine 14U BD, but continued to have elevated blood sugars so the dose was increased to 16U BD on 7/07 AM. He was then switched to insulin gtt with mealtime carb correction of 1:20 as sugars were still not adequately controlled. Will touch base with GI concerning the need for TFs if the patient is able to PO adequately - have calorie count for evaluation.  - insulin gtt  - Mealtime insulin of 1:20 carb correction  - Hypoglycemic protocol     # HTN  - hold home lisinopril 10mg daily  - monitor BPs     Diet: TF at 60ml/hr, CLD  Fluids: None  Lines: PIV  DVT Prophylaxis: Pneumatic Compression Devices  Teresa Catheter: not present  Code Status:   Full Code    Disposition Plan   Expected discharge: 2 - 3 days, recommended to prior living arrangement once repeat imagine and GI evaluation complete.  Entered: Fabienne Oliva MD 07/09/2020, 11:38 AM       The patient's  "care was discussed with the Dr. Robbin Loving \"KP\" MD Pj    Internal Medicine, PGY-3  [facility]  Pager: [phone]  ______________________________________________________________________    Interval History   No acute event overnight, VSS and afebrile. Patient has no complaints this morning but states that he does not want to leave the hospital with tubefeeds. No fever, chill,s abdominal pain, n/v, or diarrhea.     4 pt review of systems negative except as indicated in HPI.    Data reviewed today: I reviewed all medications, new labs and imaging results over the last 24 hours.     Physical Exam   Vital Signs: Temp: 96.7  F (35.9  C) Temp src: Oral BP: 121/65 Pulse: 88 Heart Rate: 82 Resp: 16 SpO2: 96 % O2 Device: None (Room air)    Weight: 159 lbs 14.4 oz  GEN: resting comfortably in the chair watching television, appears in no apparent distress  HEENT: nc/at. eomi, perrla. Moist oral mucosa  CARDIAC: rrr no m/g/r.   LUNG: ctab no w/r/r. bilateral symmetric chest expansion.  ABDOMEN: soft, mildly distended, non tender to palpation in all quadrants. +bs. No palpable organomegaly   MSK/SKIN: skin warm and well-perfused. no rashes. 2+ bilateral pedal pulses. no lower extremity, pitting edema.   NEURO: alert and oriented x 3. no focal neurologic deficits. 5/5 bilateral motor strength.    Data   Reviewed.   "

## 2020-07-09 NOTE — PLAN OF CARE
A x O, VSS on RA. C/o abdominal discomfort and bloating. Declines pain interventions overnight. Denies n/v. Tube feeding continued at 60 ml/hr via NJ tube. Insulin gtt Q1hr checks, algorithm 4. Voiding adequately, urinal at bedside. Resting between cares. Continue POC.

## 2020-07-09 NOTE — PROGRESS NOTES
Calorie Count  Intake recorded for: 7/8  Total Kcals: 1140 Total Protein: 56g  Kcals from Hospital Food: 1140  Protein: 56g  Kcals from Outside Food (average):0 Protein: 0g  # Meals Recorded: 2 meals (First - 100% ham & sausage omelet, hash browns, bagel w/ cram cheese, coffee, applesauce)      (Second - 100% deli ham sandwich w/ tomato, coffee, baby carrots, tomato soup w/ saltines)  # Supplements Recorded: 0

## 2020-07-09 NOTE — PLAN OF CARE
Transfer from [facility] for surgical evaluation by GI for the pseudocyst on 07/02/20     Pain: denies.   Nausea: denies   Lab: Insulin gtt- now on  Algorithm 4, Q1 hour checks, , 136, 158, 146, 127, 134, 132  /67 (BP Location: Right arm)   Pulse 93   Temp 97.7  F (36.5  C) (Oral)   Resp 16   Wt 72.5 kg (159 lb 14.4 oz)   SpO2 98%   Output: Voiding, not saving   Diet: clears +  tube feeds @ 60 ml/hr, tolerating well   Activity: UAL   Bowel Function: Bowel sounds heard in all quadrants, passing flatus, bm x 1 this shift   Lines: L PIV x 2   Tubes: NJ in place, tube feeds at 60 ml/hr   Skin: L posterior heel resolving wound  Plan: Continue to monitor pain, nausea, VS, output, and bowel function. Plan for  necrosectomy 7/10, NPO at midnight

## 2020-07-10 ENCOUNTER — ANESTHESIA (OUTPATIENT)
Dept: MEDSURG UNIT | Facility: CLINIC | Age: 65
End: 2020-07-10

## 2020-07-10 LAB
ALBUMIN SERPL-MCNC: 2.4 G/DL (ref 3.4–5)
ALP SERPL-CCNC: 239 U/L (ref 40–150)
ALT SERPL W P-5'-P-CCNC: 37 U/L (ref 0–70)
ANION GAP SERPL CALCULATED.3IONS-SCNC: 6 MMOL/L (ref 3–14)
AST SERPL W P-5'-P-CCNC: 36 U/L (ref 0–45)
BILIRUB SERPL-MCNC: 1.4 MG/DL (ref 0.2–1.3)
BUN SERPL-MCNC: 22 MG/DL (ref 7–30)
CALCIUM SERPL-MCNC: 8.7 MG/DL (ref 8.5–10.1)
CHLORIDE SERPL-SCNC: 103 MMOL/L (ref 94–109)
CO2 SERPL-SCNC: 26 MMOL/L (ref 20–32)
CREAT SERPL-MCNC: 0.61 MG/DL (ref 0.66–1.25)
ERYTHROCYTE [DISTWIDTH] IN BLOOD BY AUTOMATED COUNT: 17.7 % (ref 10–15)
GFR SERPL CREATININE-BSD FRML MDRD: >90 ML/MIN/{1.73_M2}
GLUCOSE BLDC GLUCOMTR-MCNC: 107 MG/DL (ref 70–99)
GLUCOSE BLDC GLUCOMTR-MCNC: 109 MG/DL (ref 70–99)
GLUCOSE BLDC GLUCOMTR-MCNC: 116 MG/DL (ref 70–99)
GLUCOSE BLDC GLUCOMTR-MCNC: 116 MG/DL (ref 70–99)
GLUCOSE BLDC GLUCOMTR-MCNC: 124 MG/DL (ref 70–99)
GLUCOSE BLDC GLUCOMTR-MCNC: 126 MG/DL (ref 70–99)
GLUCOSE BLDC GLUCOMTR-MCNC: 126 MG/DL (ref 70–99)
GLUCOSE BLDC GLUCOMTR-MCNC: 132 MG/DL (ref 70–99)
GLUCOSE BLDC GLUCOMTR-MCNC: 133 MG/DL (ref 70–99)
GLUCOSE BLDC GLUCOMTR-MCNC: 136 MG/DL (ref 70–99)
GLUCOSE BLDC GLUCOMTR-MCNC: 136 MG/DL (ref 70–99)
GLUCOSE BLDC GLUCOMTR-MCNC: 138 MG/DL (ref 70–99)
GLUCOSE BLDC GLUCOMTR-MCNC: 142 MG/DL (ref 70–99)
GLUCOSE BLDC GLUCOMTR-MCNC: 143 MG/DL (ref 70–99)
GLUCOSE BLDC GLUCOMTR-MCNC: 146 MG/DL (ref 70–99)
GLUCOSE BLDC GLUCOMTR-MCNC: 149 MG/DL (ref 70–99)
GLUCOSE BLDC GLUCOMTR-MCNC: 151 MG/DL (ref 70–99)
GLUCOSE BLDC GLUCOMTR-MCNC: 165 MG/DL (ref 70–99)
GLUCOSE BLDC GLUCOMTR-MCNC: 169 MG/DL (ref 70–99)
GLUCOSE BLDC GLUCOMTR-MCNC: 85 MG/DL (ref 70–99)
GLUCOSE BLDC GLUCOMTR-MCNC: 87 MG/DL (ref 70–99)
GLUCOSE BLDC GLUCOMTR-MCNC: 96 MG/DL (ref 70–99)
GLUCOSE SERPL-MCNC: 173 MG/DL (ref 70–99)
HCT VFR BLD AUTO: 32.5 % (ref 40–53)
HGB BLD-MCNC: 9.8 G/DL (ref 13.3–17.7)
MCH RBC QN AUTO: 27.9 PG (ref 26.5–33)
MCHC RBC AUTO-ENTMCNC: 30.2 G/DL (ref 31.5–36.5)
MCV RBC AUTO: 93 FL (ref 78–100)
PLATELET # BLD AUTO: 372 10E9/L (ref 150–450)
POTASSIUM SERPL-SCNC: 3.4 MMOL/L (ref 3.4–5.3)
PROT SERPL-MCNC: 7.4 G/DL (ref 6.8–8.8)
RBC # BLD AUTO: 3.51 10E12/L (ref 4.4–5.9)
SODIUM SERPL-SCNC: 135 MMOL/L (ref 133–144)
WBC # BLD AUTO: 8.9 10E9/L (ref 4–11)

## 2020-07-10 PROCEDURE — 80053 COMPREHEN METABOLIC PANEL: CPT | Performed by: STUDENT IN AN ORGANIZED HEALTH CARE EDUCATION/TRAINING PROGRAM

## 2020-07-10 PROCEDURE — 25000125 ZZHC RX 250: Performed by: STUDENT IN AN ORGANIZED HEALTH CARE EDUCATION/TRAINING PROGRAM

## 2020-07-10 PROCEDURE — 25000132 ZZH RX MED GY IP 250 OP 250 PS 637: Performed by: INTERNAL MEDICINE

## 2020-07-10 PROCEDURE — 00000146 ZZHCL STATISTIC GLUCOSE BY METER IP

## 2020-07-10 PROCEDURE — 36415 COLL VENOUS BLD VENIPUNCTURE: CPT | Performed by: STUDENT IN AN ORGANIZED HEALTH CARE EDUCATION/TRAINING PROGRAM

## 2020-07-10 PROCEDURE — 25800025 ZZH RX 258: Performed by: STUDENT IN AN ORGANIZED HEALTH CARE EDUCATION/TRAINING PROGRAM

## 2020-07-10 PROCEDURE — 25000132 ZZH RX MED GY IP 250 OP 250 PS 637: Performed by: STUDENT IN AN ORGANIZED HEALTH CARE EDUCATION/TRAINING PROGRAM

## 2020-07-10 PROCEDURE — 85027 COMPLETE CBC AUTOMATED: CPT | Performed by: STUDENT IN AN ORGANIZED HEALTH CARE EDUCATION/TRAINING PROGRAM

## 2020-07-10 PROCEDURE — 99207 ZZC CDG-CUT & PASTE-POTENTIAL IMPACT ON LEVEL: CPT | Performed by: INTERNAL MEDICINE

## 2020-07-10 PROCEDURE — 99232 SBSQ HOSP IP/OBS MODERATE 35: CPT | Mod: GC | Performed by: INTERNAL MEDICINE

## 2020-07-10 PROCEDURE — 12000001 ZZH R&B MED SURG/OB UMMC

## 2020-07-10 PROCEDURE — 27210437 ZZH NUTRITION PRODUCT SEMIELEM INTERMED LITER: Performed by: DIETITIAN, REGISTERED

## 2020-07-10 RX ADMIN — DEXTROSE MONOHYDRATE: 100 INJECTION, SOLUTION INTRAVENOUS at 00:50

## 2020-07-10 RX ADMIN — HUMAN INSULIN 6 UNITS/HR: 100 INJECTION, SOLUTION SUBCUTANEOUS at 09:44

## 2020-07-10 RX ADMIN — HUMAN INSULIN 4 UNITS/HR: 100 INJECTION, SOLUTION SUBCUTANEOUS at 01:00

## 2020-07-10 RX ADMIN — HUMAN INSULIN 4 UNITS/HR: 100 INJECTION, SOLUTION SUBCUTANEOUS at 05:00

## 2020-07-10 RX ADMIN — HUMAN INSULIN 4 UNITS/HR: 100 INJECTION, SOLUTION SUBCUTANEOUS at 00:00

## 2020-07-10 RX ADMIN — OMEPRAZOLE 20 MG: 20 CAPSULE, DELAYED RELEASE ORAL at 15:37

## 2020-07-10 RX ADMIN — OMEPRAZOLE 20 MG: 20 CAPSULE, DELAYED RELEASE ORAL at 18:16

## 2020-07-10 RX ADMIN — PANCRELIPASE 96000 UNITS: 24000; 76000; 120000 CAPSULE, DELAYED RELEASE PELLETS ORAL at 18:16

## 2020-07-10 RX ADMIN — PANCRELIPASE 96000 UNITS: 24000; 76000; 120000 CAPSULE, DELAYED RELEASE PELLETS ORAL at 15:38

## 2020-07-10 RX ADMIN — HUMAN INSULIN 4 UNITS/HR: 100 INJECTION, SOLUTION SUBCUTANEOUS at 19:55

## 2020-07-10 ASSESSMENT — ACTIVITIES OF DAILY LIVING (ADL)
ADLS_ACUITY_SCORE: 11

## 2020-07-10 NOTE — PLAN OF CARE
Transfer from [facility] for surgical evaluation by GI for the pseudocyst on 07/02/20     Pain: denies.   Nausea: denies   Lab: Insulin gtt- now on  Algorithm 4, Q1 hour checks, , 165, 124, 109, 96, 116, 136, 126  BP (!) 146/77 (BP Location: Right arm)   Pulse 81   Temp 96.8  F (36  C) (Oral)   Resp 16   Wt 71.6 kg (157 lb 12.8 oz)   SpO2 96%   Output: Voiding, not saving   Diet: low fat + calorie counts- OK to hold tube feeds at this time  Activity: UAL   Bowel Function: Bowel sounds hypoactive in all quadrants, passing flatus, no bm this shift   Lines: L PIV x 2, L PIV infusing D10 @ 60 ml/hr while tube feeds are being held   Tubes: NJ in place, clamped  Skin: L posterior heel resolving wound  Plan: Continue to monitor pain, nausea, VS, output, and bowel function. Necrosectomy moved to 7/13

## 2020-07-10 NOTE — PROGRESS NOTES
[facility], [place]    Progress Note - Mainor 2 Service        Date of Admission:  7/2/2020    Assessment & Plan   Alexandr Almonte is a 63 yo male with a history of HTN and complicated necrotizing pancreatitis with recent CT findings of a complex cystic structure and air within a worsening WON concerning for infection. He is presenting as a transfer from [facility] for surgical evaluation by GI for the pseudocyst on 07/02/20.     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 Most recent OSH CT imaging (6/28) shows progressive change with a large complex cystic structure in the mid abdomen and a worsening pancreatic pseudocyst with evidence of air concerning for infection. He is currently on Zosyn for suspected infection of the pseudocyst. GI evaluated the patient and suspects that the gas in the collection is due to fistula and that the pseudocyst is causing gastric outlet obstruction. EUS-guided cystogastrostomy with NJ tube placement (7/4), patient doing well. Will get repeat imaging on 7/9 with plan for necrosectomy on 7/10 pending findings.   - GI Panc/Bili consult, appreciate recs  - EUS-guided cystogastrostomy with NJ tube placement (7/4) and plan for repeat CT imaging on 7/10   - Dietician consulted, appreciate recs              - Creon 2-3 capsules of 24,000 plus PRN 2 capsules with snacks TID with meals              - Fecal elastase:  "low              - Tube feeds via NJ at a rate of 60ml/hr, tolerating well              - Monitor mag and phos, replete as needed  - Antibiotics:              - Continue Zosyn for 5 days after OR procedure (7/5 - 7/9) for prophylaxis   - Nutrition: calorie counts in place, GI ok to discontinue TF if can adequately PO the below goals per nutritionist   - Energy needs: 3528-4802+ kcals/day   - Protein needs: 98-113g prtn/day   - Likely need TF on discharge   - Intervention:   - Endoscopic necrosectomy 7/4   - Plan repeat endoscopic necrosectomy on 7/10  - Omeprazole 20 mg PO BID     # SMV & Splenic vein thrombosis  2/2 The patient was initiated on warfarin and most recently on therapeutic dosing of lovenox. On the most recent CTA (6/28), there was no visualization of the thrombosis. No thrombosis was seen on repeat CT (7/9)  - Hold off resuming anticoagulation for now     # Pancreatogenic diabetes  Hgb 6.3 on admission. He was started on his pta dose of insulin glargine 14U BD, but continued to have elevated blood sugars so the dose was increased to 16U BD on 7/07 AM. He was then switched to insulin gtt with mealtime carb correction of 1:20 as sugars were still not adequately controlled. Will touch base with GI concerning the need for TFs if the patient is able to PO adequately - have calorie count for evaluation.  - insulin gtt  - Mealtime insulin of 1:20 carb correction  - Hypoglycemic protocol     # HTN  - hold home lisinopril 10mg daily  - monitor BPs     Diet: TF at 60ml/hr, CLD  Fluids: None  Lines: PIV  DVT Prophylaxis: Pneumatic Compression Devices  Teresa Catheter: not present  Code Status:   Full Code    Disposition Plan   Expected discharge: 2 - 3 days, recommended to prior living arrangement once repeat imagine and GI evaluation complete.  Entered: Fabienne Oliva MD 07/10/2020, 12:13 PM       The patient's care was discussed with the Dr. Robbin Loving \"\" MD Pj    Internal Medicine, " PGY-3  [facility]  Pager: [phone]  ______________________________________________________________________    Interval History   No acute event overnight, VSS and afebrile. Patient has no complaints this morning. Has been ambulating around the unit a couple times a day. Denies fever, chills, abdominal pain, n/v, or diarrhea.     4 pt review of systems negative except as indicated in HPI.    Data reviewed today: I reviewed all medications, new labs and imaging results over the last 24 hours.     Physical Exam   Vital Signs: Temp: 97.8  F (36.6  C) Temp src: Oral BP: 125/66 Pulse: 76 Heart Rate: 81 Resp: 16 SpO2: 96 % O2 Device: None (Room air)    Weight: 157 lbs 12.8 oz  GEN: resting comfortably in chair, appears in no apparent distress  HEENT: nc/at. eomi, perrla. Moist oral mucosa  CARDIAC: rrr no m/g/r.   LUNG: ctab no w/r/r. bilateral symmetric chest expansion.  ABDOMEN: soft, mildly distended, non tender to palpation in all quadrants. +bs. No palpable organomegaly   MSK/SKIN: skin warm and well-perfused. no rashes. 2+ bilateral pedal pulses. no lower extremity, pitting edema.   NEURO: alert and oriented x 3. no focal neurologic deficits. 5/5 bilateral motor strength.    Data   Reviewed.

## 2020-07-10 NOTE — PLAN OF CARE
Status: Necrotizing pancreatitis     Pain: Denies  Nausea: Denies  Lab: Insulin drip on Algorithm 4.  /73 (BP Location: Right arm)   Pulse 93   Temp 97.8  F (36.6  C) (Oral)   Resp 16   Wt 72.5 kg (159 lb 14.4 oz)   SpO2 96%  - VSS on RA.  Output: Voiding, not saving.  Diet: Clear, tolerating well.  TF @ 60 mL, 60 mL water flush Q4h.  Activity: Up ad bigg to restroom. Ambulated in hallway x1.  Bowel Function: BM x1 per pt report.  Lines: PIV x2, infusing and SL.  Tubes: NG @ landmark. Nasal area intact.  Skin: Intact.  Plan: NPO @ midnight for repeat necrosectomy. Continue POC.

## 2020-07-10 NOTE — PROGRESS NOTES
CLINICAL NUTRITION SERVICES - REASSESSMENT NOTE     Nutrition Prescription    RECOMMENDATIONS FOR MDs/PROVIDERS TO ORDER:  1. Given fecal elastase is low (147.0), recommend increasing Creon 24 with meals to 4 capsules TID meals to provide 1333 units lipase/kg/meal (within goal range of 500-2500 units lipase/kg/meal - pt has been getting 2-3 capsules TID meals = 640-960 units lipase/kg/meal)    2. Recommend ordering Creon 24 + 325 mg sodium bicarb to be administered with the TF, as below.  (please copy and paste administration instructions into each order)  A) Sodium Bicarb tablet (325 mg), 1 tablet q 4 hrs via NJT. Administration Instructions: Crush 1 tablet and mix into 15 ml of warm water and use this solution to mix with Creon pancreatic enzymes. DO NOT administer directly into NJT (to be mixed into TF formula with Creon enzyme - see Creon enzyme order)   B) Creon 24, 2 capsules q 4 hrs via NJT. Administration Instructions:   **Open capsule and empty contents into 15 ml sodium bicarb solution (see sodium bicarb order), let dissolve for about 20-30 minutes and then add this solution to the amount of TF formula hung in TF bag every 4 hrs (i.e., once TF @ goal infusion 60 ml/hr will mix 2 capsules into 240 ml of TF formula every 4 hrs).   *Note: this enzyme regimen with TF @ goal infusion will provide approx 3130 units of lipase/gram of total Fat daily and approp dosing initially for pancreatic insufficiency with more elemental TF formula (dosing range 4943-2893 units lipase/g fat/day).     3. Recommend re-ordering kcal counts once no longer NPO. Current 2 days of kcal counts inadequate: Total average: 967 kcal (13 kcal/kg), 45 g pro (0.6 g/kg)       -Recommend continuing TF as ordered    Malnutrition Status:    Severe malnutrition in the context of acute illness    Recommendations already ordered by Registered Dietitian (RD):  Continue TF as ordered    Future/Additional Recommendations:  Monitor weight trends, po  intake, stool output/fecal elastase with increase in PERT     EVALUATION OF THE PROGRESS TOWARD GOALS   Diet: NPO; previously on regular diet x 2 days and clear liquids yesterday  Nutrition Support: Impact Peptide @ goal 60 ml/hr (1440 ml/day) to provide 2160 kcals (29 kcal/kg/day), 135 gm PRO (1.8 g/kg/day), 1109 ml free H2O, 92 gm Fat (50% from MCTs), 202 gm CHO and no Fiber daily.  Intake: TF reached goal rate on 7/6 (started on 7/4). Average daily intake of 1200 ml TF volume since reaching goal rate -> 1800 kcal (24 kcal/kg, 80% of low end needs), 113 g pro (1.5 g/kg, 100% of needs)  Kcal counts x 2 days  7/8: Total Kcals: 1140     Total Protein: 56g   7/9: Total Kcals: 794       Total Protein: 34g  7/10: NPO  Total average: 967 kcal (13 kcal/kg), 45 g pro (0.6 g/kg)     NEW FINDINGS   Weight: indicates 7.7% weight loss x 8 days - significant  07/10/20 0800  71.6 kg (157 lb 12.8 oz)     07/09/20 0645  72.5 kg (159 lb 14.4 oz)     07/06/20 1227  75.7 kg (166 lb 14.4 oz)     07/02/20 1755  77.6 kg (171 lb)       NEW Dosing Weight: 72 kg on 7/10    ASSESSED NUTRITION NEEDS   Estimated Energy Needs: 4576-4749+ kcals/day (30 - 35+ kcals/kg )   Justification: Increased needs with necrotizing pancreatitis, wound healing   Estimated Protein Needs: + grams protein/day (1.3 - 1.5+ grams of pro/kg)   Justification: Increased needs with necrotizing pancreatitis, wound healing   Estimated Fluid Needs: (1 mL/kcal)   Justification: Maintenance, or other per provider pending fluid status     Labs: fecal elastase 147.0 (L on 7/6)  Meds: Creon 24 (2-3 capsules TID meals = 640-960 units lipase/kg/meal), 1 units per 20 g CHO TID meals, medium sliding scale insulin, 16 units lantus BID, certavite, miralax, senna-docusate, insulin gtt  GI: BMs: 4 on 7/6, 3 on 7/7, 1 on 7/8, 1 on 7/9    TF off since midnight for repeat necrosectomy today. Per notes, pt does not want to discharge home with tube feeding. Current kcal counts  inadequate.     MALNUTRITION  % Intake: < 50% x >/= 5 days (severe)  % Weight Loss: > 2% in 1 week (severe); plus > 7.5% weight loss > 3 months (severe)  Subcutaneous Fat Loss: Unable to assess  Muscle Loss: Unable to assess  Fluid Accumulation/Edema: None noted  Malnutrition Diagnosis: Severe malnutrition in the context of acute illness    Previous Goals   Diet adv v nutrition support within 2-3 days.  Evaluation: Met    Previous Nutrition Diagnosis  Inadequate oral intake related to bloating/poor PO intake tolerance 2/2 pancreatitis as evidenced by poor PO intakes and unintentional wt loss  Evaluation: No change    CURRENT NUTRITION DIAGNOSIS  Inadequate oral intake related to poor appetite and suspected malabsorption 2/2 pancreatitis as evidenced by 2 day kcal count 967 kcal (13 kcal/kg), 45 g pro (0.6 g/kg), >2% weight loss x 1 week, and low fecal elastase indicating need for increased PERT.    INTERVENTIONS  Implementation  Enzyme recommendations  Collaboration with other providers - paged team with recs    Goals  Total avg nutritional intake to meet a minimum of 30 kcal/kg and 1.3 g PRO/kg daily (per new dosing wt 72 kg).    Monitoring/Evaluation  Progress toward goals will be monitored and evaluated per protocol.    Laura Michael, MS, RD, LD, CNSC  [address] pager: [phone]

## 2020-07-10 NOTE — PROGRESS NOTES
Calorie Count  Intake recorded for: 7/9  Total Kcals: 794 Total Protein: 34g  Kcals from Hospital Food: 794  Protein: 34g  Kcals from Outside Food (average):0 Protein: 0g  # Meals Recorded: 100% baby carrots, 2 coffees, ham & sausage omelet, hash browns, English muffin w/ butter, 2 jellos  # Supplements Recorded: 0

## 2020-07-10 NOTE — CONSULTS
"Diabetes/Hyperglycemia Management Consult    Chief Complaint further insulin management in the setting of pancreatogenic diabetes currently on insulin drip, PTA glargine 14 units bid  Consult requested by: Dr. Oliva  History of Present Illness Norberto \" Mateus\" Suzy is a 64 year old male with history of HTN , hyperlipidemia idiopathic acute pancreatitis ( dx 4/28/2020) complicated by  necrotizing pancreatitis with recent CT findings of a complex cystic structure and air within a worsening pseudocyst concerning for infection. He is presenting as a transfer from OSH (7/2/2020) for surgical evaluation by GI for the pseudocyst on 07/02/20.    Information was obtained from review of medical records and interview with patient.    Mr. Almonte reports dx of diabetes on 4/28/2020, \"when everything happened.\" No family history of diabetes. PTA medications as listed below. Was testing blood sugars twice daily and reports glcusoe  with occasional 200's. Denies hypoglycemia.      Per chart review, Mr. Almonte  presented  To [facility] and was dx with idiopathic acute pancreatitis ( dx 4/28/2020). He was transferred to [facility] in [place] for higher level of care. 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 Per documentation, he had an extended hospital stay.    Per documentation, Mr. Almonte presented to OSH again on (6/28) with nausea and vomiting. Transferred to [facility] after, CT imaging showed progressive change with a large complex cystic structure in the mid abdomen and a worsening pancreatic pseudocyst with evidence of air concerning for infection. He was stabilized and transfered on (7/2/2020) to [facility]    .  Glucose on presentation 168 with a1C of 6.3%.  Basal  insulin started on (7/3)  glargine at 7 units " twice daily 2/2 NPO along with sliding scale insulin. Glucose 106- 168  received one dose of dexamethasone on (7/4) 4 mg.  Blood sugars gradually increased and lantus dose increased as well.with large doses of novolog sliding scale insulin.  Nutrition plan has been variable  Started on tube feeds started on 7/4 and reached goal on 7/6 of Impact peptide at 60 ml/hour goal  Blood sugars > 400 when IV insulin was started on (7/7/2020) + oral intake of > 100 grams of CHO for a meal.  IV insulin rates 0- 8 units per hour for glcusoe < 200.      Currently, denies fever, chills, chest pain, shortness of breath, abdominal pain, nausea and or vomiting, bowel or bladder issues. Endorses that he will not go home on enteral feeds.      Recent Labs   Lab 07/10/20  1001 07/10/20  0856 07/10/20  0758 07/10/20  0710 07/10/20  0655 07/10/20  0603 07/10/20  0518  07/09/20  0731  07/08/20  0643  07/07/20  0712  07/06/20  0614  07/05/20  0702   GLC  --   --   --  173*  --   --   --   --  119*  --  134*  --  290*  --  178*  --  192*   * 165* 151*  --  169* 143* 142*   < >  --    < >  --    < >  --    < >  --    < >  --     < > = values in this interval not displayed.         Diabetes Type: type 3c diabetes, also called pancreoprivic diabetes  Diabetes Duration: dx 4/28/2020  Usual Diabetes Regimen:   lantus 14 units twice daily    Ability to [place] Prescribed Regimen: unknown, not sure on the amount of education he has received  Diabetes Control:   Lab Results   Component Value Date    A1C 6.3 07/02/2020       Able to Detect Hypoglycemia: has not experienced  Usual Diabetes Care Provider: PCP Dr. Ernie Yin in [facility]  Factors Impacting Glucose Control: NPO, enteral feeds      Review of Systems  10 point ROS completed with pertinent positives and negatives noted in the HPI    Past medical, family and social histories are reviewed and updated.    Past Medical History  Hypertension  Hyperlipidemia  Idiopathic  pancreatitis    Family History  No family history specific for diabetes    Social History  Social History     Socioeconomic History     Marital status: Single     Spouse name: None     Number of children: None     Years of education: None     Highest education level: None   Occupational History     None   Social Needs     Financial resource strain: None     Food insecurity     Worry: None     Inability: None     Transportation needs     Medical: None     Non-medical: None   Tobacco Use     Smoking status: None   Substance and Sexual Activity     Alcohol use: None     Drug use: None     Sexual activity: None   Lifestyle     Physical activity     Days per week: None     Minutes per session: None     Stress: None   Relationships     Social connections     Talks on phone: None     Gets together: None     Attends [employer] service: None     Active member of club or organization: None     Attends meetings of clubs or organizations: None     Relationship status: None     Intimate partner violence     Fear of current or ex partner: None     Emotionally abused: None     Physically abused: None     Forced sexual activity: None   Other Topics Concern     None   Social History Narrative     None         Physical Exam    General:   resting in bed, in no distress, thin   HEENT: PER and anicteric, non-injected, oral mucous membranes moist.   Lungs:  Non-labored  ABD: soft  Skin: warm and dry,   MSK:  fluid movement of all extremities  Mental status:  alert, oriented x3, communicating clearly  Psych:  calm, even mood    Laboratory  Recent Labs   Lab Test 07/10/20  0710 07/09/20  0731    133   POTASSIUM 3.4 4.0   CHLORIDE 103 102   CO2 26 27   ANIONGAP 6 4   * 119*   BUN 22 22   CR 0.61* 0.60*   BARAK 8.7 9.1     CBC RESULTS:   Recent Labs   Lab Test 07/10/20  0710   WBC 8.9   RBC 3.51*   HGB 9.8*   HCT 32.5*   MCV 93   MCH 27.9   MCHC 30.2*   RDW 17.7*          Liver Function Studies -   Recent Labs   Lab Test  07/10/20  0710   PROTTOTAL 7.4   ALBUMIN 2.4*   BILITOTAL 1.4*   ALKPHOS 239*   AST 36   ALT 37       Active Medications  Current Facility-Administered Medications   Medication     amylase-lipase-protease (CREON 24) 31809-79666 units per EC capsule 48,000 Units     amylase-lipase-protease (CREON 24) 37279-85512 units per EC capsule 48,000-72,000 Units     benzocaine-menthol (CEPACOL) 15-3.6 MG lozenge 1 lozenge     dextrose 10% infusion     dextrose 10% infusion     glucose gel 15-30 g    Or     dextrose 50 % injection 25-50 mL    Or     glucagon injection 1 mg     glucose gel 15-30 g    Or     dextrose 50 % injection 25-50 mL    Or     glucagon injection 1 mg     insulin 1 unit/mL in saline (NovoLIN, HumuLIN Regular) drip - ADULT IV Infusion     [Held by provider] insulin aspart (NovoLOG) injection (RAPID ACTING)     [Held by provider] insulin aspart (NovoLOG) injection (RAPID ACTING)     insulin aspart (NovoLOG) injection (RAPID ACTING)     insulin aspart (NovoLOG) injection (RAPID ACTING)     insulin aspart (NovoLOG) injection (RAPID ACTING)     insulin aspart (NovoLOG) injection (RAPID ACTING)     [Held by provider] insulin glargine (LANTUS PEN) injection 16 Units     lidocaine (LMX4) cream     lidocaine 1 % 0.1-1 mL     melatonin tablet 1 mg     multivitamins w/minerals (CERTAVITE) liquid 15 mL     naloxone (NARCAN) injection 0.1-0.4 mg     omeprazole (priLOSEC) CR capsule 20 mg     ondansetron (ZOFRAN-ODT) ODT tab 4 mg    Or     ondansetron (ZOFRAN) injection 4 mg     polyethylene glycol (MIRALAX) Packet 17 g     senna-docusate (SENOKOT-S/PERICOLACE) 8.6-50 MG per tablet 1 tablet    Or     senna-docusate (SENOKOT-S/PERICOLACE) 8.6-50 MG per tablet 2 tablet     sodium chloride (PF) 0.9% PF flush 3 mL     sodium chloride (PF) 0.9% PF flush 3 mL     sodium chloride (PF) 0.9% PF flush 3 mL     sodium phosphate 15 mmol in D5W intermittent infusion     sodium phosphate 20 mmol in D5W intermittent infusion     sodium  "phosphate 25 mmol in D5W intermittent infusion     No current outpatient medications on file.       Current Diet  Orders Placed This Encounter      NPO for Medical/Clinical Reasons Except for: Meds        Assessment: Norberto \" Mateus\" Suzy is a 64 year old male with history of HTN , hyperlipidemia idiopathic acute pancreatitis ( dx 4/28/2020) complicated by  necrotizing pancreatitis with recent CT findings of a complex cystic structure and air within a worsening pseudocyst concerning for infection. He is presenting as a transfer from OSH (7/2/2020) for surgical evaluation by GI for the pseudocyst on 07/02/20.     type 3c diabetes, also called pancreoprivic diabetes:    Started on tube feeds s7/4 and reached goal on 7/6 of Impact peptide at 60 ml/hour goal  TF off since midnight for repeat necrosectomy today. Per notes, pt does not want to discharge home with tube feeding. Current kcal counts inadequate.      Plan  - continue on IV insulin for now due to NPO for procedure and nutritional plan unknown  - hypoglycemia protocol  - will continue to follow  - may require additional education   -plan would need to be simplified for discharge  - possible discharge on Sunday per primary team    Anil Knight, [phone]  Diabetes Management Team job code: [id number]    I spent a total of 80 minutes bedside and on the inpatient unit managing the glycemic care of Norberto Almonte. Over 50% of my time on the unit was spent counseling the patient  and/or coordinating care regarding .  See note for details.  "

## 2020-07-10 NOTE — PROVIDER NOTIFICATION
Notified intern about pt tolerating PO intake and pt currently infusing D10% into PIV. Asked provider if we can discontinue this order. Provider said since pt is tolerating PO intake and even though pt TF are held we can discontinue this D10% PRN cont infusion.

## 2020-07-11 LAB
ALBUMIN SERPL-MCNC: 2.4 G/DL (ref 3.4–5)
ALP SERPL-CCNC: 200 U/L (ref 40–150)
ALT SERPL W P-5'-P-CCNC: 34 U/L (ref 0–70)
ANION GAP SERPL CALCULATED.3IONS-SCNC: 5 MMOL/L (ref 3–14)
AST SERPL W P-5'-P-CCNC: 27 U/L (ref 0–45)
BILIRUB SERPL-MCNC: 0.8 MG/DL (ref 0.2–1.3)
BUN SERPL-MCNC: 18 MG/DL (ref 7–30)
CALCIUM SERPL-MCNC: 9 MG/DL (ref 8.5–10.1)
CHLORIDE SERPL-SCNC: 107 MMOL/L (ref 94–109)
CO2 SERPL-SCNC: 26 MMOL/L (ref 20–32)
CREAT SERPL-MCNC: 0.64 MG/DL (ref 0.66–1.25)
ERYTHROCYTE [DISTWIDTH] IN BLOOD BY AUTOMATED COUNT: 17.7 % (ref 10–15)
GFR SERPL CREATININE-BSD FRML MDRD: >90 ML/MIN/{1.73_M2}
GLUCOSE BLDC GLUCOMTR-MCNC: 104 MG/DL (ref 70–99)
GLUCOSE BLDC GLUCOMTR-MCNC: 109 MG/DL (ref 70–99)
GLUCOSE BLDC GLUCOMTR-MCNC: 109 MG/DL (ref 70–99)
GLUCOSE BLDC GLUCOMTR-MCNC: 111 MG/DL (ref 70–99)
GLUCOSE BLDC GLUCOMTR-MCNC: 114 MG/DL (ref 70–99)
GLUCOSE BLDC GLUCOMTR-MCNC: 115 MG/DL (ref 70–99)
GLUCOSE BLDC GLUCOMTR-MCNC: 117 MG/DL (ref 70–99)
GLUCOSE BLDC GLUCOMTR-MCNC: 118 MG/DL (ref 70–99)
GLUCOSE BLDC GLUCOMTR-MCNC: 122 MG/DL (ref 70–99)
GLUCOSE BLDC GLUCOMTR-MCNC: 132 MG/DL (ref 70–99)
GLUCOSE BLDC GLUCOMTR-MCNC: 135 MG/DL (ref 70–99)
GLUCOSE BLDC GLUCOMTR-MCNC: 142 MG/DL (ref 70–99)
GLUCOSE BLDC GLUCOMTR-MCNC: 178 MG/DL (ref 70–99)
GLUCOSE BLDC GLUCOMTR-MCNC: 88 MG/DL (ref 70–99)
GLUCOSE BLDC GLUCOMTR-MCNC: 92 MG/DL (ref 70–99)
GLUCOSE SERPL-MCNC: 104 MG/DL (ref 70–99)
HCT VFR BLD AUTO: 34.8 % (ref 40–53)
HGB BLD-MCNC: 10.5 G/DL (ref 13.3–17.7)
MCH RBC QN AUTO: 28.3 PG (ref 26.5–33)
MCHC RBC AUTO-ENTMCNC: 30.2 G/DL (ref 31.5–36.5)
MCV RBC AUTO: 94 FL (ref 78–100)
PLATELET # BLD AUTO: 381 10E9/L (ref 150–450)
POTASSIUM SERPL-SCNC: 3.8 MMOL/L (ref 3.4–5.3)
PROT SERPL-MCNC: 7.4 G/DL (ref 6.8–8.8)
RBC # BLD AUTO: 3.71 10E12/L (ref 4.4–5.9)
SODIUM SERPL-SCNC: 138 MMOL/L (ref 133–144)
WBC # BLD AUTO: 9.9 10E9/L (ref 4–11)

## 2020-07-11 PROCEDURE — 25000132 ZZH RX MED GY IP 250 OP 250 PS 637: Performed by: INTERNAL MEDICINE

## 2020-07-11 PROCEDURE — 25000132 ZZH RX MED GY IP 250 OP 250 PS 637: Performed by: STUDENT IN AN ORGANIZED HEALTH CARE EDUCATION/TRAINING PROGRAM

## 2020-07-11 PROCEDURE — 12000001 ZZH R&B MED SURG/OB UMMC

## 2020-07-11 PROCEDURE — 36415 COLL VENOUS BLD VENIPUNCTURE: CPT | Performed by: STUDENT IN AN ORGANIZED HEALTH CARE EDUCATION/TRAINING PROGRAM

## 2020-07-11 PROCEDURE — 99233 SBSQ HOSP IP/OBS HIGH 50: CPT | Mod: GC | Performed by: INTERNAL MEDICINE

## 2020-07-11 PROCEDURE — 85027 COMPLETE CBC AUTOMATED: CPT | Performed by: STUDENT IN AN ORGANIZED HEALTH CARE EDUCATION/TRAINING PROGRAM

## 2020-07-11 PROCEDURE — 00000146 ZZHCL STATISTIC GLUCOSE BY METER IP

## 2020-07-11 PROCEDURE — 99207 ZZC CDG-MDM COMPONENT: MEETS MODERATE - UP CODED: CPT | Performed by: INTERNAL MEDICINE

## 2020-07-11 PROCEDURE — 80053 COMPREHEN METABOLIC PANEL: CPT | Performed by: STUDENT IN AN ORGANIZED HEALTH CARE EDUCATION/TRAINING PROGRAM

## 2020-07-11 RX ORDER — DEXTROSE MONOHYDRATE 25 G/50ML
25-50 INJECTION, SOLUTION INTRAVENOUS
Status: DISCONTINUED | OUTPATIENT
Start: 2020-07-11 | End: 2020-07-16 | Stop reason: HOSPADM

## 2020-07-11 RX ORDER — NICOTINE POLACRILEX 4 MG
15-30 LOZENGE BUCCAL
Status: DISCONTINUED | OUTPATIENT
Start: 2020-07-11 | End: 2020-07-16 | Stop reason: HOSPADM

## 2020-07-11 RX ADMIN — BENZOCAINE AND MENTHOL 1 LOZENGE: 15; 3.6 LOZENGE ORAL at 11:44

## 2020-07-11 RX ADMIN — PANCRELIPASE 96000 UNITS: 24000; 76000; 120000 CAPSULE, DELAYED RELEASE PELLETS ORAL at 07:19

## 2020-07-11 RX ADMIN — BENZOCAINE AND MENTHOL 1 LOZENGE: 15; 3.6 LOZENGE ORAL at 15:29

## 2020-07-11 RX ADMIN — BENZOCAINE AND MENTHOL 1 LOZENGE: 15; 3.6 LOZENGE ORAL at 18:17

## 2020-07-11 RX ADMIN — OMEPRAZOLE 20 MG: 20 CAPSULE, DELAYED RELEASE ORAL at 18:17

## 2020-07-11 RX ADMIN — MULTIVITAMIN 15 ML: LIQUID ORAL at 07:20

## 2020-07-11 RX ADMIN — OMEPRAZOLE 20 MG: 20 CAPSULE, DELAYED RELEASE ORAL at 07:20

## 2020-07-11 RX ADMIN — PANCRELIPASE 96000 UNITS: 24000; 76000; 120000 CAPSULE, DELAYED RELEASE PELLETS ORAL at 11:44

## 2020-07-11 RX ADMIN — PANCRELIPASE 96000 UNITS: 24000; 76000; 120000 CAPSULE, DELAYED RELEASE PELLETS ORAL at 18:18

## 2020-07-11 RX ADMIN — BENZOCAINE AND MENTHOL 1 LOZENGE: 15; 3.6 LOZENGE ORAL at 17:00

## 2020-07-11 ASSESSMENT — ACTIVITIES OF DAILY LIVING (ADL)
ADLS_ACUITY_SCORE: 11

## 2020-07-11 NOTE — PROGRESS NOTES
Diabetes Consult Daily  Progress Note          Assessment/Plan:   64-year-old man with newly diagnosed diabetes (since April 2020) in the setting of acute pancreatitis (April 2020) complicated by necrotizing pancreatitis.  Admitted on 7/2/2020 for surgical evaluation by GI for cirrhosis.  Admission hemoglobin A1c of 6.2.  Tube feeds on 7 4 and reached goal at 7/6/2020.    Currently on insulin drip.  Prior to admission program on glargine at 14 units twice daily.  PLAN  1.  Diabetes with concurrent tube feedings-on insulin drip  *We will change patient over him from insulin drip to subcu shots daily.  Patient requiring *approximately 2 units/h. (translated to about 48 units daily)  *Will change patient to glargine 24 units twice daily. (ordered for you)  *Pt to change to novolog at 1 per 10 gram carb (ordered for you)  *Continue with NAHID              Interval History:   Pt eating and walking around. On insulin drop. Requiring about 2 units per hour. Possible procedure on Monday 7/13.       Recent Labs   Lab 07/11/20  0807 07/11/20  0717 07/11/20  0609 07/11/20  0541 07/11/20  0437 07/11/20  0337  07/10/20  0710  07/09/20  0731  07/08/20  0643  07/07/20  0712  07/06/20  0614  07/05/20  0702   GLC  --   --   --   --   --   --   --  173*  --  119*  --  134*  --  290*  --  178*  --  192*   * 104* 114* 122* 88 109*   < >  --    < >  --    < >  --    < >  --    < >  --    < >  --     < > = values in this interval not displayed.               Review of Systems:             Medications:     Orders Placed This Encounter      Low Fat Diet       Physical Exam:  Vital signs and physical exam not available.  However the patient reports feeling well. Psych: Alert and oriented times 3; coherent speech, normal  rate and volume, able to articulate logical thoughts, able to abstract reason, no tangential thoughts, no hallucinations or delusions  /60 (BP Location: Left arm)   Pulse 83   Temp 97.6  F (36.4  C)  (Oral)   Resp 18   Wt 71.6 kg (157 lb 12.8 oz)   SpO2 97%            Data:     Lab Results   Component Value Date    A1C 6.3 07/02/2020              CBC RESULTS:   Recent Labs   Lab Test 07/11/20  0717   WBC 9.9   RBC 3.71*   HGB 10.5*   HCT 34.8*   MCV 94   MCH 28.3   MCHC 30.2*   RDW 17.7*        Recent Labs   Lab Test 07/10/20  0710 07/09/20  0731    133   POTASSIUM 3.4 4.0   CHLORIDE 103 102   CO2 26 27   ANIONGAP 6 4   * 119*   BUN 22 22   CR 0.61* 0.60*   BARAK 8.7 9.1     Liver Function Studies -   Recent Labs   Lab Test 07/10/20  0710   PROTTOTAL 7.4   ALBUMIN 2.4*   BILITOTAL 1.4*   ALKPHOS 239*   AST 36   ALT 37     Lab Results   Component Value Date    INR 1.18 07/04/2020    INR 1.19 07/02/2020       25 minutes spent bedside or on the floor counseling and/or coordinating care as well as the total time on the floor/unit attending to the individual patient. This was conducted as virtual visit - plan relayed to nursing team about change to subcutaneous insulin.    Corinna Martinez -[phone]

## 2020-07-11 NOTE — PROGRESS NOTES
[facility], [place]    Progress Note - Mainor 2 Service        Date of Admission:  7/2/2020    Assessment & Plan   Alexandr Almonte is a 63 yo male with a history of HTN and complicated necrotizing pancreatitis with recent CT findings of a complex cystic structure and air within a worsening WON concerning for infection. He is presenting as a transfer from [facility] beds at the [facility] for surgical evaluation by GI for the pseudocyst on 07/02/20.     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 Most recent OSH CT imaging (6/28) shows progressive change with a large complex cystic structure in the mid abdomen and a worsening pancreatic pseudocyst with evidence of air concerning for infection. Completed a course of Zosyn for suspected infection of the pseudocyst. GI evaluated the patient and suspects that the gas in the collection is due to fistula and that the pseudocyst is causing gastric outlet obstruction. EUS-guided cystogastrostomy with NJ tube placement (7/4), patient doing well. Repeat imaging 7/9 showed decreased size of the necrotic collection and unchanged biliary dilatation. GI will plan to take to the OR for repeat necrosectomy 7/13.  - GI Panc/Bili consult, appreciate recs  - Creon 4 capsules of 24,000 plus PRN 2 capsules with snacks TID with meals  - Fecal elastase: low  - Tube feeds discontinued, PO trial  - Monitor mag and phos, replete as needed  - Antibiotics:              - None  - Nutrition: calorie counts in place, GI ok to  discontinue TF if can adequately PO the below goals per nutritionist                 - Energy needs: 5351-3281+ kcals/day                 - Protein needs: 98-113g prtn/day  - Intervention:                 - Endoscopic necrosectomy 7/4                 - Repeat endoscopic necrosectomy on 7/13  - Omeprazole 20 mg PO BID     # SMV & Splenic vein thrombosis  2/2 The patient was initiated on warfarin and most recently on therapeutic dosing of lovenox. On the most recent CTA (6/28), there was no visualization of the thrombosis. No thrombosis was seen on repeat CT (7/9)  - Hold off resuming anticoagulation for now     # Pancreatogenic diabetes  Hgb 6.3 on admission. He was started on his pta dose of insulin glargine 14U BD, but continued to have elevated blood sugars so the dose was increased to 16U BD on 7/07 AM. He was then switched to insulin gtt with mealtime carb correction of 1:20 as sugars were still not adequately controlled. Endocrine was consulted and the patient was allowed to trial PO intake while clamping the NJ tube. Gtt was stopped and the patient was transitioned to glargine 24U BID and novolog 1:10 carb ratio on 7/11.  - Glargine 24U BID  - Mealtime insulin of 1:10 carb correction   - NAHID   - Hypoglycemic protocol     # HTN  - hold home lisinopril 10mg daily  - monitor BPs      Lines: PIV  DVT Prophylaxis: Low Risk/Ambulatory with no VTE prophylaxis indicated  Fluids: mIVF  Teresa Catheter: not present  Code Status:   Full Code         Disposition Plan   Expected discharge: 2 - 3 days, recommended to prior living arrangement once Repeat necrosectomy and imaging complete.  Entered: Aminah Zepeda MD 07/11/2020, 9:06 AM       The patient's care was discussed with the Attending Physician, Dr. Siegel.    Aminah Zepeda MD  [address], [place]  Pager: [phone]  Please see sticky note for cross cover  information  ______________________________________________________________________    Interval History   No acute events overnight. VSS, afebrile. Patient has no complaints, but would like a full diet as he doesn't feel that he can order enough food on the low-fat diet plan. He has no abdominal pain, nausea, or vomiting. Voiding ans stooling well. Continues to walk laps around unit.     4pt review of systems negative except as indicated above subjective.    Data reviewed today: I reviewed all medications, new labs and imaging results over the last 24 hours.     Physical Exam   Vital Signs: Temp: 97.6  F (36.4  C) Temp src: Oral BP: 123/60 Pulse: 83 Heart Rate: 97 Resp: 18 SpO2: 97 % O2 Device: None (Room air)    Weight: 157 lbs 12.8 oz  GEN: resting comfortably in chair, appears in no apparent distress, finishing breakfast and in a good mood  HEENT: nc/at. eomi, perrla. Oral mucosa moist and without lesion  CARDIAC: rrr no m/g/r.   LUNG: ctab no w/r/r. bilateral symmetric chest expansion.  ABDOMEN: soft, mild distension, not tender to palpation. +bs in 4 quadrants. No palpable organomegaly  MSK/SKIN: skin warm and well-perfused. no rashes. 2+ bilateral pedal pulses. no lower extremity, pitting edema.   NEURO: alert and oriented x 3. no focal neurologic deficits. 5/5 bilateral motor strength.      Data   Reviewed.

## 2020-07-11 NOTE — PLAN OF CARE
AOx4, UAL. AVSS on ra. Tolerating low fat diet. Denies +flatus, BM on days. NJ clamped. Voiding but not saving. Denies pain. PIV infusing with insulin gtt on Alg 4, Q1hr BG checks.Plan is for pt to have Necrosectomy 7/13.    Notified intern earlier in shift about pt tolerating PO intake and pt currently infusing D10% into PIV. Asked provider if we can discontinue this order. Provider said since pt is tolerating PO intake and even though pt TF are held we can discontinue this D10% PRN cont infusion.

## 2020-07-11 NOTE — PLAN OF CARE
/48 (BP Location: Right arm)   Pulse 83   Temp 98  F (36.7  C) (Oral)   Resp 16   Wt 71.6 kg (157 lb 12.8 oz)   SpO2 96%     VSS Afebrile. Up independently. BG checks q hour. Alg #4. Lungs clear, Active bowel sounds. NJ clamped. Tolerated PO food. Will continue with POC.

## 2020-07-12 LAB
BUN SERPL-MCNC: 15 MG/DL (ref 7–30)
ERYTHROCYTE [DISTWIDTH] IN BLOOD BY AUTOMATED COUNT: 17.4 % (ref 10–15)
GLUCOSE BLDC GLUCOMTR-MCNC: 115 MG/DL (ref 70–99)
GLUCOSE BLDC GLUCOMTR-MCNC: 131 MG/DL (ref 70–99)
GLUCOSE BLDC GLUCOMTR-MCNC: 153 MG/DL (ref 70–99)
GLUCOSE BLDC GLUCOMTR-MCNC: 89 MG/DL (ref 70–99)
GLUCOSE BLDC GLUCOMTR-MCNC: 94 MG/DL (ref 70–99)
HCT VFR BLD AUTO: 33.3 % (ref 40–53)
HGB BLD-MCNC: 10.2 G/DL (ref 13.3–17.7)
INR PPP: 1.04 (ref 0.86–1.14)
MCH RBC QN AUTO: 28.6 PG (ref 26.5–33)
MCHC RBC AUTO-ENTMCNC: 30.6 G/DL (ref 31.5–36.5)
MCV RBC AUTO: 93 FL (ref 78–100)
PLATELET # BLD AUTO: 377 10E9/L (ref 150–450)
RBC # BLD AUTO: 3.57 10E12/L (ref 4.4–5.9)
WBC # BLD AUTO: 9.8 10E9/L (ref 4–11)

## 2020-07-12 PROCEDURE — 25000132 ZZH RX MED GY IP 250 OP 250 PS 637: Performed by: STUDENT IN AN ORGANIZED HEALTH CARE EDUCATION/TRAINING PROGRAM

## 2020-07-12 PROCEDURE — 36415 COLL VENOUS BLD VENIPUNCTURE: CPT | Performed by: INTERNAL MEDICINE

## 2020-07-12 PROCEDURE — 85027 COMPLETE CBC AUTOMATED: CPT | Performed by: INTERNAL MEDICINE

## 2020-07-12 PROCEDURE — 00000146 ZZHCL STATISTIC GLUCOSE BY METER IP

## 2020-07-12 PROCEDURE — 99207 ZZC CDG-CUT & PASTE-POTENTIAL IMPACT ON LEVEL: CPT | Performed by: INTERNAL MEDICINE

## 2020-07-12 PROCEDURE — 85610 PROTHROMBIN TIME: CPT | Performed by: INTERNAL MEDICINE

## 2020-07-12 PROCEDURE — 25000132 ZZH RX MED GY IP 250 OP 250 PS 637: Performed by: INTERNAL MEDICINE

## 2020-07-12 PROCEDURE — 84520 ASSAY OF UREA NITROGEN: CPT | Performed by: INTERNAL MEDICINE

## 2020-07-12 PROCEDURE — 99233 SBSQ HOSP IP/OBS HIGH 50: CPT | Mod: GC | Performed by: INTERNAL MEDICINE

## 2020-07-12 PROCEDURE — 12000001 ZZH R&B MED SURG/OB UMMC

## 2020-07-12 RX ORDER — LIDOCAINE 40 MG/G
CREAM TOPICAL
Status: DISCONTINUED | OUTPATIENT
Start: 2020-07-12 | End: 2020-07-15 | Stop reason: HOSPADM

## 2020-07-12 RX ADMIN — MULTIVITAMIN 15 ML: LIQUID ORAL at 08:00

## 2020-07-12 RX ADMIN — OMEPRAZOLE 20 MG: 20 CAPSULE, DELAYED RELEASE ORAL at 07:57

## 2020-07-12 RX ADMIN — PANCRELIPASE 96000 UNITS: 24000; 76000; 120000 CAPSULE, DELAYED RELEASE PELLETS ORAL at 12:55

## 2020-07-12 RX ADMIN — PANCRELIPASE 96000 UNITS: 24000; 76000; 120000 CAPSULE, DELAYED RELEASE PELLETS ORAL at 07:57

## 2020-07-12 RX ADMIN — PANCRELIPASE 48000 UNITS: 24000; 76000; 120000 CAPSULE, DELAYED RELEASE PELLETS ORAL at 18:37

## 2020-07-12 RX ADMIN — OMEPRAZOLE 20 MG: 20 CAPSULE, DELAYED RELEASE ORAL at 18:38

## 2020-07-12 ASSESSMENT — ACTIVITIES OF DAILY LIVING (ADL)
ADLS_ACUITY_SCORE: 11

## 2020-07-12 NOTE — PLAN OF CARE
VSS on Ra, A&Ox4, denies nausea, denies pain. Passing large amount of gas, bowel sounds hyperactive. Voiding adequately, not saving urine. Up ad bigg. Regular diet, appetite good. Blood sugars stable, sliding scale insulin coverage for daytime with meals. Calorie counts maintained. NJ tube clamped, site CDI. PIVx2 saline locked. Plan for necrosectomy 7/13, discharge home afterwards without possible NJ tube- pending Regular diet + calorie counts trial. Cont POC.

## 2020-07-12 NOTE — PROGRESS NOTES
Calorie Count  Intake recorded for: 7/11  Total Kcals: 906 Total Protein: 42g  Kcals from Hospital Food: 906  Protein: 42g  Kcals from Outside Food (average):0 Protein: 0g  # Meals Recorded: 100% turkey burger w/ cheddar, tomato, pickles, and ketchup, coffee, tomato soup w/ crackers, mac and cheese, French fries, orange juice  # Supplements Recorded: 0

## 2020-07-12 NOTE — PLAN OF CARE
Patient reports feeling a bit bloated but had 3-4 BM's today, voiding not saving. Patient is ambulating independently in halls. Great appetite on calorie counts eating all food on tray. NJ clamped. OR scheduled for tomorrow will be NPO after midnight. Blood sugars 115 and 89 before meals, carb coverage provided per orders. Heart rate tachy in the 80-90's, OVSS.

## 2020-07-12 NOTE — PROGRESS NOTES
[facility], [place]    Progress Note - Mainor 2 Service        Date of Admission:  7/2/2020    Assessment & Plan   Alexandr Almonte is a 63 yo male with a history of HTN and complicated necrotizing pancreatitis with recent CT findings of a complex cystic structure and air within a worsening WON concerning for infection. He is presenting as a transfer from [facility] for surgical evaluation by GI for the pseudocyst on 07/02/20.     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 Most recent OSH CT imaging (6/28) shows progressive change with a large complex cystic structure in the mid abdomen and a worsening pancreatic pseudocyst with evidence of air concerning for infection. Completed a course of Zosyn for suspected infection of the pseudocyst. GI evaluated the patient and suspects that the gas in the collection is due to fistula and that the pseudocyst is causing gastric outlet obstruction. EUS-guided cystogastrostomy with NJ tube placement (7/4), patient doing well. Repeat imaging 7/9 showed decreased size of the necrotic collection and unchanged biliary dilatation. GI will plan to take to the OR for repeat necrosectomy 7/13.  - GI Panc/Bili consult, appreciate recs  - Creon 4 capsules of 24,000 plus PRN 2 capsules with snacks TID with meals  - Fecal elastase: low  - Tube feeds discontinued, PO trial  - Monitor mag and phos, replete as needed  - Antibiotics:              - None  - Nutrition: calorie counts in place, GI ok to discontinue TF if can adequately PO the below goals per  nutritionist                 - Energy needs: 5902-2047+ kcals/day                 - Protein needs: 98-113g prtn/day  - Intervention:                 - Endoscopic necrosectomy 7/4                 - Repeat endoscopic necrosectomy on 7/13  - Omeprazole 20 mg PO BID  - NPO at midnight 7/12 for procedure tomorrow. No change in glargine dosing per endocrine note.      # SMV & Splenic vein thrombosis  2/2 The patient was initiated on warfarin and most recently on therapeutic dosing of lovenox. On the most recent CTA (6/28), there was no visualization of the thrombosis. No thrombosis was seen on repeat CT (7/9)  - Hold off resuming anticoagulation for now     # Pancreatogenic diabetes  Hgb 6.3 on admission. He was started on his pta dose of insulin glargine 14U BD, but continued to have elevated blood sugars so the dose was increased to 16U BD on 7/07 AM. He was then switched to insulin gtt with mealtime carb correction of 1:20 as sugars were still not adequately controlled. Endocrine was consulted and the patient was allowed to trial PO intake while clamping the NJ tube. Gtt was stopped and the patient was transitioned to glargine 22U BID and novolog 1:10 carb ratio.   - Glargine 22U BID  - Mealtime insulin of 1:10 carb correction   [place] [facility]   - Hypoglycemic protocol     # HTN  - hold home lisinopril 10mg daily  - monitor BPs     Lines: PIV  DVT Prophylaxis: Low Risk/Ambulatory with no VTE prophylaxis indicated  Fluids: None  Teresa Catheter: not present  Code Status:   Full Code    Disposition Plan   Expected discharge: 4 - 7 days, recommended to prior living arrangement once antibiotic plan established and following necrosectomy pending repeat imaging.  Entered: Aminah Zepeda MD 07/12/2020, 11:39 AM       The patient's care was discussed with the Attending Physician, Dr. Siegel.    Aminah Zepeda MD  [address], [place]  Pager: [phone]  Please see sticky note for cross  cover information  ______________________________________________________________________    Interval History   No acute events overnight, VSS and afebrile. Patient feels well this morning and has been ambulating in the hallways. He is tolerating PO intake and reports wishing he could order more food as he remains hungry. Normal BM x3 since yesterday. No abdominal pain, n/v or diarrhea. Eagerly anticipating surgery tomorrow.    4pt review of systems negative except as indicated in the subjective above.     Data reviewed today: I reviewed all medications, new labs and imaging results over the last 24 hours.    Physical Exam   Vital Signs: Temp: 97.9  F (36.6  C) Temp src: Oral BP: 135/74 Pulse: 86 Heart Rate: 86 Resp: 16 SpO2: 98 % O2 Device: None (Room air)    Weight: 157 lbs 12.8 oz  GEN: resting comfortably in a chair listening to music, no acute distress, NJ tube in place  HEENT: nc/at, EOMI, oral mucosa moist and without lesion  CV: RRR, no m/r/g, 2+ radial pulses b/l  PULM: ctab, bilateral chest expansion, no increased work of breathing, no w/r/r  ABD: soft, non-tender, mildly distended, +bs, no palpable organomegaly  MSK/SKIN: skin warm and well perfused, no rashes, no LE edema  NEURO: alert and oriented x3, no focal deficits, 5/5 bilateral motor strength    Data   Reviewed.

## 2020-07-12 NOTE — PROGRESS NOTES
Diabetes Consult Daily  Progress Note          Assessment/Plan:   64-year-old man with newly diagnosed diabetes (since April 2020) in the setting of acute pancreatitis (April 2020) complicated by necrotizing pancreatitis.  Admitted on 7/2/2020 for surgical evaluation by GI for cirrhosis.  Admission hemoglobin A1c of 6.2.  Tube feeds on 7 4 and reached goal at 7/6/2020.    Currently on insulin drip.  Prior to admission program on glargine at 14 units twice daily. Anticipate procedure on 7/13    PLAN  1.  Diabetes with hx of pancreatitis/necrotizing pancreatitis  *will slightly reduce glargine to  22 units twice daily (ordered for you)  *continue with novolog at 1 per 10 gram with meals  *ordered diabetes ed to teach pt (likely will need fixed dose insulin) about novolog with meals (ordered for you)  *when pt is NPO- pt should continue with the glargine              Interval History:   Pt eating and walking around. Possible endoscopic necrosectomy on Monday 7/13.       Recent Labs   Lab 07/12/20  0755 07/12/20  0214 07/11/20  2214 07/11/20  1811 07/11/20  1159 07/11/20  1111  07/11/20  0717  07/10/20  0710  07/09/20  0731  07/08/20  0643  07/07/20  0712  07/06/20  0614   GLC  --   --   --   --   --   --   --  104*  --  173*  --  119*  --  134*  --  290*  --  178*   * 131* 135* 178* 115* 118*   < > 104*   < >  --    < >  --    < >  --    < >  --    < >  --     < > = values in this interval not displayed.               Review of Systems:             Medications:     Orders Placed This Encounter      Regular Diet Adult      NPO per Anesthesia Guidelines for Procedure/Surgery Except for: Meds       Physical Exam:  Vital signs and physical exam not available.  However the patient reports feeling well. Psych: Alert and oriented times 3; coherent speech, normal  rate and volume, able to articulate logical thoughts, able to abstract reason, no tangential thoughts, no hallucinations or delusions  /74 (BP  Location: Right arm)   Pulse 86   Temp 97.9  F (36.6  C) (Oral)   Resp 16   Wt 71.6 kg (157 lb 12.8 oz)   SpO2 98%            Data:     Lab Results   Component Value Date    A1C 6.3 07/02/2020              CBC RESULTS:   Recent Labs   Lab Test 07/11/20  0717   WBC 9.9   RBC 3.71*   HGB 10.5*   HCT 34.8*   MCV 94   MCH 28.3   MCHC 30.2*   RDW 17.7*        Recent Labs   Lab Test 07/10/20  0710 07/09/20  0731    133   POTASSIUM 3.4 4.0   CHLORIDE 103 102   CO2 26 27   ANIONGAP 6 4   * 119*   BUN 22 22   CR 0.61* 0.60*   BARAK 8.7 9.1     Liver Function Studies -   Recent Labs   Lab Test 07/10/20  0710   PROTTOTAL 7.4   ALBUMIN 2.4*   BILITOTAL 1.4*   ALKPHOS 239*   AST 36   ALT 37     Lab Results   Component Value Date    INR 1.18 07/04/2020    INR 1.19 07/02/2020       25 minutes spent bedside or on the floor counseling and/or coordinating care as well as the total time on the floor/unit attending to the individual patient. This was conducted as virtual visit - plan relayed to nursing team about change to subcutaneous insulin.    Corinna Martinez [phone]

## 2020-07-12 NOTE — PROVIDER NOTIFICATION
Provider notified regarding question for pt Lantus. Pt is suppose to have procedure tomorrow per report. Pt has Lantus scheduled at 2000. Wondering if we should hold.     Provider responded and said that endocrine note said to continue to give Lantus even though pt will be NPO at midnight. Provider said to page if pt becomes hypoglycemic.

## 2020-07-12 NOTE — PLAN OF CARE
/72 (BP Location: Right arm)   Pulse 96   Temp 98.2  F (36.8  C) (Oral)   Resp 18   Wt 71.6 kg (157 lb 12.8 oz)   SpO2 96%   Assumed care from 0749-8437. Hypertensive but not within notifying parameters, all other VSS on RA. A&Ox4. Denies pain and nausea, tolerating regular diet. Stopped insulin gtt at 1200, BGs checked and corrected with sliding scale and carb coverage. Left PIV SL. NJ clamped, otherwise WNL. Passing gas, 2 BMs today. Voiding spont, not saving. Up ad bigg, frequently walking throughout the halls. Plan is for pt to have Necrosectomy 7/13. Continue POC.

## 2020-07-13 ENCOUNTER — HOSPITAL ENCOUNTER (OUTPATIENT)
Facility: AMBULATORY SURGERY CENTER | Age: 65
End: 2020-07-13
Attending: INTERNAL MEDICINE
Payer: OTHER GOVERNMENT

## 2020-07-13 ENCOUNTER — ANESTHESIA (OUTPATIENT)
Dept: SURGERY | Facility: CLINIC | Age: 65
DRG: 405 | End: 2020-07-13
Payer: OTHER GOVERNMENT

## 2020-07-13 ENCOUNTER — ANESTHESIA EVENT (OUTPATIENT)
Dept: SURGERY | Facility: CLINIC | Age: 65
DRG: 405 | End: 2020-07-13
Payer: OTHER GOVERNMENT

## 2020-07-13 LAB
ALBUMIN SERPL-MCNC: 2.6 G/DL (ref 3.4–5)
ALP SERPL-CCNC: 159 U/L (ref 40–150)
ALT SERPL W P-5'-P-CCNC: 24 U/L (ref 0–70)
ANION GAP SERPL CALCULATED.3IONS-SCNC: 5 MMOL/L (ref 3–14)
AST SERPL W P-5'-P-CCNC: 19 U/L (ref 0–45)
BILIRUB SERPL-MCNC: 1.3 MG/DL (ref 0.2–1.3)
BUN SERPL-MCNC: 11 MG/DL (ref 7–30)
CALCIUM SERPL-MCNC: 9 MG/DL (ref 8.5–10.1)
CHLORIDE SERPL-SCNC: 105 MMOL/L (ref 94–109)
CO2 SERPL-SCNC: 27 MMOL/L (ref 20–32)
CREAT SERPL-MCNC: 0.63 MG/DL (ref 0.66–1.25)
ERYTHROCYTE [DISTWIDTH] IN BLOOD BY AUTOMATED COUNT: 17.2 % (ref 10–15)
GFR SERPL CREATININE-BSD FRML MDRD: >90 ML/MIN/{1.73_M2}
GLUCOSE BLDC GLUCOMTR-MCNC: 103 MG/DL (ref 70–99)
GLUCOSE BLDC GLUCOMTR-MCNC: 106 MG/DL (ref 70–99)
GLUCOSE BLDC GLUCOMTR-MCNC: 49 MG/DL (ref 70–99)
GLUCOSE BLDC GLUCOMTR-MCNC: 83 MG/DL (ref 70–99)
GLUCOSE BLDC GLUCOMTR-MCNC: 90 MG/DL (ref 70–99)
GLUCOSE BLDC GLUCOMTR-MCNC: 99 MG/DL (ref 70–99)
GLUCOSE SERPL-MCNC: 98 MG/DL (ref 70–99)
HCT VFR BLD AUTO: 34.2 % (ref 40–53)
HGB BLD-MCNC: 10.2 G/DL (ref 13.3–17.7)
MCH RBC QN AUTO: 28 PG (ref 26.5–33)
MCHC RBC AUTO-ENTMCNC: 29.8 G/DL (ref 31.5–36.5)
MCV RBC AUTO: 94 FL (ref 78–100)
PLATELET # BLD AUTO: 324 10E9/L (ref 150–450)
POTASSIUM SERPL-SCNC: 3.8 MMOL/L (ref 3.4–5.3)
PROT SERPL-MCNC: 7.7 G/DL (ref 6.8–8.8)
RBC # BLD AUTO: 3.64 10E12/L (ref 4.4–5.9)
SODIUM SERPL-SCNC: 137 MMOL/L (ref 133–144)
WBC # BLD AUTO: 8.4 10E9/L (ref 4–11)

## 2020-07-13 PROCEDURE — 25000132 ZZH RX MED GY IP 250 OP 250 PS 637: Performed by: INTERNAL MEDICINE

## 2020-07-13 PROCEDURE — 00000146 ZZHCL STATISTIC GLUCOSE BY METER IP

## 2020-07-13 PROCEDURE — 99207 ZZC CDG-CUT & PASTE-POTENTIAL IMPACT ON LEVEL: CPT | Performed by: INTERNAL MEDICINE

## 2020-07-13 PROCEDURE — 25000132 ZZH RX MED GY IP 250 OP 250 PS 637: Performed by: STUDENT IN AN ORGANIZED HEALTH CARE EDUCATION/TRAINING PROGRAM

## 2020-07-13 PROCEDURE — 85027 COMPLETE CBC AUTOMATED: CPT | Performed by: STUDENT IN AN ORGANIZED HEALTH CARE EDUCATION/TRAINING PROGRAM

## 2020-07-13 PROCEDURE — 25800025 ZZH RX 258: Performed by: STUDENT IN AN ORGANIZED HEALTH CARE EDUCATION/TRAINING PROGRAM

## 2020-07-13 PROCEDURE — 12000001 ZZH R&B MED SURG/OB UMMC

## 2020-07-13 PROCEDURE — 99233 SBSQ HOSP IP/OBS HIGH 50: CPT | Mod: GC | Performed by: INTERNAL MEDICINE

## 2020-07-13 PROCEDURE — 80053 COMPREHEN METABOLIC PANEL: CPT | Performed by: STUDENT IN AN ORGANIZED HEALTH CARE EDUCATION/TRAINING PROGRAM

## 2020-07-13 PROCEDURE — 36415 COLL VENOUS BLD VENIPUNCTURE: CPT | Performed by: STUDENT IN AN ORGANIZED HEALTH CARE EDUCATION/TRAINING PROGRAM

## 2020-07-13 RX ADMIN — DEXTROSE AND SODIUM CHLORIDE: 5; 450 INJECTION, SOLUTION INTRAVENOUS at 12:54

## 2020-07-13 RX ADMIN — PANCRELIPASE 96000 UNITS: 24000; 76000; 120000 CAPSULE, DELAYED RELEASE PELLETS ORAL at 16:09

## 2020-07-13 RX ADMIN — PANCRELIPASE 48000 UNITS: 24000; 76000; 120000 CAPSULE, DELAYED RELEASE PELLETS ORAL at 17:53

## 2020-07-13 RX ADMIN — DEXTROSE MONOHYDRATE 25 ML: 25 INJECTION, SOLUTION INTRAVENOUS at 12:38

## 2020-07-13 RX ADMIN — OMEPRAZOLE 20 MG: 20 CAPSULE, DELAYED RELEASE ORAL at 15:39

## 2020-07-13 ASSESSMENT — ACTIVITIES OF DAILY LIVING (ADL)
ADLS_ACUITY_SCORE: 11

## 2020-07-13 NOTE — PROGRESS NOTES
Diabetes Consult Daily  Progress Note          Assessment/Plan:   64-year-old man with newly diagnosed diabetes (since April 2020) in the setting of acute pancreatitis (April 2020) complicated by necrotizing pancreatitis.  Admitted on 7/2/2020 for surgical evaluation by GI for cirrhosis.  Admission hemoglobin A1c of 6.2.  Tube feeds on 7/ 4 and reached goal at 7/6/2020--> regular diet only since 7/11.    BG trending in tight control, NPO for procedure    PLAN  1.  Diabetes with hx of pancreatitis/necrotizing pancreatitis  *glargine reduced to 14 units this morning for NPO, then 20 units BID from this evening  (BG still dropped, so starting dextrose MIVF at 1245)  *continue with novolog at 1 per 10 gram with meals  *aspart medium resistance correction qAC, HS  *ordered diabetes ed to teach pt (likely will need fixed dose insulin) about novolog with meals   *BG qAC, HS 0200  *when pt is NPO- pt should continue with the glargine  *pt was on BID glargine at home, but with addition of aspart injections, he may prefer daily glargine to minimize number of injections    Outpatient follow up for diabetes- prefers [place] do [facility] for now, then seek care near home in [place], [place]    ADD 1910-- necrosectomy rescheduled.  Allowed dinner then NPO again midnight.  Ate huge dinner- 190 grams carb.  Reduce glargine to 14 units for tonight              Interval History:   Two solid meals yesterday bfast and lunch.  At supper, did not get any aspart (comment reads pt consumed less than 10g carb) but the calorie count lists pudding and two jellos.  Uncovered carb intake would have insulated him from developing low BG overnight.  glargine reduced to 22 units starting last evening.    Feels hungry and wants to eat, but NPO since MN and anticipating endoscopic necrosectomy on Monday 7/13 at 1600.  He's really hoping the procedure isn't cancelled.    Likes to keep active.  Plans to resume biking by August 1.  Used to ride  daily, 5-15 miles, in all weather.  Unemployed presently.         PTA diabetes regimen:   Checking BG twice daily: glucose  with occasional 200s  Lantus 14 units in AM around 0800 and in evening sometime between 3693-3598  No Novolog at home    Recent Labs   Lab 07/13/20  0720 07/13/20  0652 07/13/20  0155 07/12/20  2137 07/12/20  1841 07/12/20  1228 07/12/20  0755  07/11/20  0717  07/10/20  0710  07/09/20  0731  07/08/20  0643  07/07/20  0712   GLC 98  --   --   --   --   --   --   --  104*  --  173*  --  119*  --  134*  --  290*   BGM  --  90 83 153* 94 89 115*   < > 104*   < >  --    < >  --    < >  --    < >  --     < > = values in this interval not displayed.               Review of Systems:   See interval hx          Medications:     Orders Placed This Encounter      NPO per Anesthesia Guidelines for Procedure/Surgery Except for: Meds               Physical Exam     /70 (BP Location: Left arm)   Pulse 76   Temp 96.8  F (36  C) (Oral)   Resp 16   Wt 71.6 kg (157 lb 12.8 oz)   SpO2 97%     General:  pleasant thin man resting in chair in no distress.   HEENT: NC/AT, PER and anicteric, non-injected, oral mucous membranes moist.   Lungs: unlabored respiration, no cough  Skin: warm and dry, no obvious lesions  MSK:  fluid movement of all extremities  Lymp:  no LE edema   Mental status:  alert, oriented x3, communicating clearly  Psych:  calm, even mood    /70 (BP Location: Left arm)   Pulse 96   Temp 96.3  F (35.7  C) (Oral)   Resp 16   Wt 71.6 kg (157 lb 12.8 oz)   SpO2 97%            Data:     Lab Results   Component Value Date    A1C 6.3 07/02/2020            Recent Labs   Lab Test 07/13/20  0720 07/12/20  1611 07/11/20  0717     --  138   POTASSIUM 3.8  --  3.8   CHLORIDE 105  --  107   CO2 27  --  26   ANIONGAP 5  --  5   GLC 98  --  104*   BUN 11 15 18   CR 0.63*  --  0.64*   BARAK 9.0  --  9.0     CBC RESULTS:   Recent Labs   Lab Test 07/13/20  0720   WBC 8.4   RBC 3.64*   HGB  10.2*   HCT 34.2*   MCV 94   MCH 28.0   MCHC 29.8*   RDW 17.2*      I spent a total of 35 minutes bedside and on the inpatient unit managing the glycemic care of Norberto Almonte. Over 50% of my time on the unit was spent counseling the patient  and/or coordinating care regarding acute glucose management.  See note for details.    Jyothi Schultz APRN [facility] [phone]  Diabetes Management Team job code: [phone]

## 2020-07-13 NOTE — PLAN OF CARE
AOx4. UAL with halls, ambulating halls. HTN but within order parameters, OVSS on ra. Reg diet but only having full liq this humberto. NJ clamped. NPO at midnight for OR tomorrow for necrosectomy that is planned at 1645. + flatus, BM x1 on shift. Denies N/V. Voiding but not saving. Denies pain. PIV x2 SL. BG monitored but no sliding scale insulin or carb coverage needed this humberto. Providers okay with pt getting Lantus even though pt will be NPO at midnight, see provider note for additional info. Pt refused to do scrubs or shower this humberto and said he will wake up at 5am to shower. Cont POC.

## 2020-07-13 NOTE — PLAN OF CARE
Transfer from [facility] for surgical evaluation by GI for the pseudocyst on 07/02/20     Pain: denies.   Nausea: denies   Lab: BS Q4- AM BS 98, lunch check was 49- administered IV dextrose, re-check was 106- page notified MD. See supplemental note  /70 (BP Location: Left arm)   Pulse 76   Temp 96.8  F (36  C) (Oral)   Resp 16   Wt 71.6 kg (157 lb 12.8 oz)   SpO2 97%    Output: Voiding, not saving   Diet: NPO   Activity: UAL   Bowel Function: Bowel sounds heard in all quadrants, passing flatus, bm x1 this shift.   Lines: L PIV x 2. L PIV infusing MIVF, dressing CDI   Tubes: NJ in place, clamped  Skin: L posterior heel resolving wound  Plan: Continue to monitor pain, nausea, VS, output, and bowel function. Necrosectomy this afternoon.

## 2020-07-13 NOTE — PROVIDER NOTIFICATION
Page notified MD regarding patient status. BS at 6297- [phone]. Administered 25 ml dextrose 50%. Verbally discussed plan with MD. Orders placed for dextrose 5% and 0.45% NaCl. Will continue to monitor patient status.     15 minute re-check BS - 106 & dextrose 5% and 0.45% NaCl started.

## 2020-07-13 NOTE — PROGRESS NOTES
Brief GI Note    Patient has been rescheduled again for his necrosectomy.    OK to advance diet, will reschedule for tomorrow. Please keep NPO at MN.    Kwesi SYED MD  Gastroenterology Fellow  Division of Gastroenterology, Hepatology and Nutrition  [facility]

## 2020-07-13 NOTE — PROGRESS NOTES
Calorie Count  Intake recorded for: 7/12  Total Kcals: 1577 Total Protein: 72g  Kcals from Hospital Food: 1577   Protein: 72g  Kcals from Outside Food (average):0 Protein: 0g  # Meals Recorded: 3 meals (First- 100% peaches, ham and cheese omelet, hash browns, english muffin w/ butter and jelly)     (Second- 100% turkey burger, tomato soup w/ crackers, fries w/ ketchup)     (Third- 100% pudding, 2 Jell-O)  # Supplements Recorded: 0

## 2020-07-13 NOTE — PLAN OF CARE
VSS on RA, denies nausea, denies pain. A&Ox4. NJ clamped, site CDI. Up ad bigg. Voiding adequately, not saving urine. Passing gas. PT NPO for Necrosectomy today at 1645. PIVx2 saline locked. Blood sugar checks 83 and 90.  Will continue to monitor. Pre procedure shower this AM. Second pre procedure scrub still needing to be done. Cont POC.

## 2020-07-13 NOTE — PROGRESS NOTES
[facility], [place]    Progress Note - Mainor 2 Service        Date of Admission:  7/2/2020    Assessment & Plan   Alexandr Almonte is a 65 yo male with a history of HTN and complicated necrotizing pancreatitis with recent CT findings of a complex cystic structure and air within a worsening WON concerning for infection. He is presenting as a transfer from [facility] for surgical evaluation by GI for the pseudocyst on 07/02/20.     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 Most recent OSH CT imaging (6/28) shows progressive change with a large complex cystic structure in the mid abdomen and a worsening pancreatic pseudocyst with evidence of air concerning for infection. Completed a course of Zosyn for suspected infection of the pseudocyst. GI evaluated the patient and suspects that the gas in the collection is due to fistula and that the pseudocyst is causing gastric outlet obstruction. EUS-guided cystogastrostomy with NJ tube placement (7/4), patient doing well. Repeat imaging 7/9 showed decreased size of the necrotic collection and unchanged biliary dilatation. GI will plan to take to the OR for repeat necrosectomy 7/13.  - GI Panc/Bili consult, appreciate recs  - Creon 4 capsules of 24,000 plus PRN 2 capsules with snacks TID with meals  - Fecal elastase: low  - Tube feeds discontinued, PO trial  - Monitor mag and phos, replete as needed  - Antibiotics:              - None  - Nutrition: calorie counts in place                 - Energy needs: 8870-7605+ kcals/day                 - Protein  needs: 98-113g prtn/day  - Intervention:                 - Endoscopic necrosectomy 7/4                 - Repeat endoscopic necrosectomy on 7/13  - Omeprazole 20 mg PO BID     # SMV & Splenic vein thrombosis  2/2 The patient was initiated on warfarin and most recently on therapeutic dosing of lovenox. On the most recent CTA (6/28), there was no visualization of the thrombosis. No thrombosis was seen on repeat CT (7/9)  - Hold off resuming anticoagulation for now     # Pancreatogenic diabetes  Hgb 6.3 on admission. He was started on his pta dose of insulin glargine 14U BD, but continued to have elevated blood sugars so the dose was increased to 16U BD on 7/07 AM. He was then switched to insulin gtt with mealtime carb correction of 1:20 as sugars were still not adequately controlled. Endocrine was consulted and the patient was allowed to trial PO intake while clamping the NJ tube. Gtt was stopped and the patient was transitioned to glargine 22U BID and novolog 1:10 carb ratio.   - Glargine 22U BID  - Mealtime insulin of 1:10 carb correction   - [facility] [place]   - Hypoglycemic protocol  - Glargine 14U for AM dose at patient NPO since last night and surgery not scheduled until 1600 today     # HTN  - hold home lisinopril 10mg daily  - monitor BPs     Lines: PIV  DVT Prophylaxis: Low Risk/Ambulatory with no VTE prophylaxis indicated  Fluids: None  Diet: NPO until after surgery, then as tolerated  Teresa Catheter: not present  Code Status:   Full Code         Disposition Plan   Expected discharge: 2 - 3 days, recommended to prior living arrangement once adequate surgical management and tolerating PO.  Entered: Aminah Zepeda MD 07/13/2020, 8:21 AM       The patient's care was discussed with the Attending Physician, Dr. Siegel.    Aminah Zepeda MD  [address], [place]  Pager: [phone]  Please see sticky note for cross cover  information  ______________________________________________________________________    Interval History   No acute events overnight, VSS and afebrile. Patient has been NPO since midnight and is scheduled for the OR at 1600 today for repeat necrosectomy. Lantus was decreased to 14U for AM dose. Patient notes sore spot on the distal medial aspect of the R foot and is hungry, otherwise no complaints and eager to have the surgery completed later today. He states that his bloating has improved since yesterday. No abdominal pain, no n/v, stooling well.     4pt review of systems negative except as indicated in the subjective above.     Data reviewed today: I reviewed all medications, new labs and imaging results over the last 24 hours.   Physical Exam   Vital Signs: Temp: 96.3  F (35.7  C) Temp src: Oral BP: 126/70 Pulse: 96 Heart Rate: 100 Resp: 16 SpO2: 97 % O2 Device: None (Room air)    Weight: 157 lbs 12.8 oz  GEN: resting comfortably in the chair, no acute distress, NJ in place  HEENT: nc/at, EOMI, PERRLA, oral mucosa moist and without lesion  CV: RRR, no m/r/g, 2+ radial pulses b/l  PULM: ctab, bilateral chest expansion, no increased work of breathing, no w/r/r  ABD: soft, non-tender, mildly distended, +bs, no palpable organomegaly  MSK/SKIN: skin warm and well perfused, small slightly erythematous area of distal medial foot that is mildly tender to palpation (looks like he rubbed his shoe) no rashes, no LE edema  NEURO: alert and oriented x3, no focal deficits, 5/5 bilateral motor strength    Data   Reviewed.

## 2020-07-14 LAB
ALBUMIN SERPL-MCNC: 2.6 G/DL (ref 3.4–5)
ALP SERPL-CCNC: 147 U/L (ref 40–150)
ALT SERPL W P-5'-P-CCNC: 22 U/L (ref 0–70)
ANION GAP SERPL CALCULATED.3IONS-SCNC: 8 MMOL/L (ref 3–14)
AST SERPL W P-5'-P-CCNC: 16 U/L (ref 0–45)
BILIRUB SERPL-MCNC: 0.7 MG/DL (ref 0.2–1.3)
BUN SERPL-MCNC: 12 MG/DL (ref 7–30)
CALCIUM SERPL-MCNC: 9 MG/DL (ref 8.5–10.1)
CHLORIDE SERPL-SCNC: 106 MMOL/L (ref 94–109)
CO2 SERPL-SCNC: 24 MMOL/L (ref 20–32)
CREAT SERPL-MCNC: 0.6 MG/DL (ref 0.66–1.25)
ERYTHROCYTE [DISTWIDTH] IN BLOOD BY AUTOMATED COUNT: 17.2 % (ref 10–15)
GFR SERPL CREATININE-BSD FRML MDRD: >90 ML/MIN/{1.73_M2}
GLUCOSE BLDC GLUCOMTR-MCNC: 114 MG/DL (ref 70–99)
GLUCOSE BLDC GLUCOMTR-MCNC: 127 MG/DL (ref 70–99)
GLUCOSE BLDC GLUCOMTR-MCNC: 152 MG/DL (ref 70–99)
GLUCOSE BLDC GLUCOMTR-MCNC: 70 MG/DL (ref 70–99)
GLUCOSE SERPL-MCNC: 98 MG/DL (ref 70–99)
HCT VFR BLD AUTO: 34.1 % (ref 40–53)
HGB BLD-MCNC: 10.2 G/DL (ref 13.3–17.7)
MCH RBC QN AUTO: 27.6 PG (ref 26.5–33)
MCHC RBC AUTO-ENTMCNC: 29.9 G/DL (ref 31.5–36.5)
MCV RBC AUTO: 92 FL (ref 78–100)
PLATELET # BLD AUTO: 328 10E9/L (ref 150–450)
POTASSIUM SERPL-SCNC: 3.8 MMOL/L (ref 3.4–5.3)
PROT SERPL-MCNC: 7.5 G/DL (ref 6.8–8.8)
RBC # BLD AUTO: 3.69 10E12/L (ref 4.4–5.9)
SODIUM SERPL-SCNC: 138 MMOL/L (ref 133–144)
WBC # BLD AUTO: 7.7 10E9/L (ref 4–11)

## 2020-07-14 PROCEDURE — 25000132 ZZH RX MED GY IP 250 OP 250 PS 637: Performed by: INTERNAL MEDICINE

## 2020-07-14 PROCEDURE — 99233 SBSQ HOSP IP/OBS HIGH 50: CPT | Mod: GC | Performed by: PEDIATRICS

## 2020-07-14 PROCEDURE — 00000146 ZZHCL STATISTIC GLUCOSE BY METER IP

## 2020-07-14 PROCEDURE — 25000132 ZZH RX MED GY IP 250 OP 250 PS 637: Performed by: STUDENT IN AN ORGANIZED HEALTH CARE EDUCATION/TRAINING PROGRAM

## 2020-07-14 PROCEDURE — 80053 COMPREHEN METABOLIC PANEL: CPT | Performed by: STUDENT IN AN ORGANIZED HEALTH CARE EDUCATION/TRAINING PROGRAM

## 2020-07-14 PROCEDURE — 12000001 ZZH R&B MED SURG/OB UMMC

## 2020-07-14 PROCEDURE — 85027 COMPLETE CBC AUTOMATED: CPT | Performed by: STUDENT IN AN ORGANIZED HEALTH CARE EDUCATION/TRAINING PROGRAM

## 2020-07-14 PROCEDURE — 36415 COLL VENOUS BLD VENIPUNCTURE: CPT | Performed by: STUDENT IN AN ORGANIZED HEALTH CARE EDUCATION/TRAINING PROGRAM

## 2020-07-14 PROCEDURE — 99207 ZZC CDG-CUT & PASTE-POTENTIAL IMPACT ON LEVEL: CPT | Performed by: PEDIATRICS

## 2020-07-14 RX ADMIN — PANCRELIPASE 96000 UNITS: 24000; 76000; 120000 CAPSULE, DELAYED RELEASE PELLETS ORAL at 18:00

## 2020-07-14 RX ADMIN — PANCRELIPASE 96000 UNITS: 24000; 76000; 120000 CAPSULE, DELAYED RELEASE PELLETS ORAL at 11:28

## 2020-07-14 RX ADMIN — OMEPRAZOLE 20 MG: 20 CAPSULE, DELAYED RELEASE ORAL at 17:59

## 2020-07-14 RX ADMIN — OMEPRAZOLE 20 MG: 20 CAPSULE, DELAYED RELEASE ORAL at 11:28

## 2020-07-14 RX ADMIN — DOCUSATE SODIUM AND SENNOSIDES 2 TABLET: 8.6; 5 TABLET ORAL at 19:27

## 2020-07-14 ASSESSMENT — ACTIVITIES OF DAILY LIVING (ADL)
ADLS_ACUITY_SCORE: 11

## 2020-07-14 NOTE — PLAN OF CARE
Transfer from [facility] for surgical evaluation by GI for the pseudocyst on 07/02/20     Pain: denies.   Nausea: denies   Lab: BS Q4- 98, 114  /72 (BP Location: Left arm)   Pulse 90   Temp 97.8  F (36.6  C) (Oral)   Resp 16   Wt 71.6 kg (157 lb 12.8 oz)   SpO2 97%    Output: Voiding, not saving   Diet: Low fat diet   Activity: UAL   Bowel Function: Bowel sounds heard in all quadrants, passing flatus, bm x1 this shift.   Lines: L PIV x 2. PIVs saline locked, dressing CDI   Tubes: NJ in place, clamped  Skin: L posterior heel resolving wound  Plan:  Necrosectomy tomorrow AM

## 2020-07-14 NOTE — PROGRESS NOTES
"CLINICAL NUTRITION SERVICES  *See RD note on 7/10 for last nutrition reassessment details    Per Endocrine provider, pt with questions about carbohydrate content in menu items, general education about what foods contain carbohydrates, and has questions about low fat diet.  Suggested pt get written carbohydrate ed materials.    Per patient, he would like written education materials on carbohydrates in food and low fat diet.  Diet is currently regular, but pt says his medical team today told him he should still be following low fat diet.  Pt says he is unsure at this time if he will go home on carb counting insulin with meals/snacks or fixed insulin doses (per Endo note yesterday, \"likely will need fixed dose insulin\").      NTERVENTIONS  Implementation  Nutrition Education:  1. Provided instruction on CHO in foods and meal planning.  Provided handout with carbohydrate content of hospital menu items as well as Guide to Carbohydrate Counting booklet for general carbohydrate education     2. Provided Fat-Restricted Nutrition Therapy handout from Nutrition Care Manual.  Discussed which foods are higher and lower in fat and how to keep fat intake in moderation.      Monitoring/Evaluation  Patient to ask any further nutrition-related questions before discharge.  In addition, pt may request outpatient RD appointment.  Progress toward goals will be monitored and evaluated per protocol.     Lauren Carrasco RD, LD  7C RD pager: [phone]   "

## 2020-07-14 NOTE — PROGRESS NOTES
Calorie Count  Intake recorded for: 7/13  Total Kcals: 0 Total Protein: 0g  Kcals from Hospital Food: 0   Protein: 0g  Kcals from Outside Food (average):0 Protein: 0g  # Meals Recorded: 2 meals ordered from kitchen, no intake recorded.   # Supplements Recorded: no intake recorded.     Note: Per Epic Food Record, pt consumed 100% @ 5 PM, but not enough information was given to calculate calories/protein.

## 2020-07-14 NOTE — PROGRESS NOTES
Diabetes Consult Daily  Progress Note          Assessment/Plan:   64-year-old man with newly diagnosed diabetes (since April 2020) in the setting of acute pancreatitis (April 2020) complicated by necrotizing pancreatitis.  Admitted on 7/2/2020 for surgical evaluation by GI for cirrhosis.  Admission hemoglobin A1c of 6.2.  Tube feeds on 7/ 4 and reached goal at 7/6/2020--> regular diet only since 7/11.    BG trending in good control, now allowed to eat again since necrosectomy rescheduled to tomorrow    PLAN  1.  Diabetes with hx of pancreatitis/necrotizing pancreatitis  *glargine 20 units x 1 this morning, reduce evening dose to 10 units, dosed for NPO    *continue with novolog at 1 per 10 gram with meals  *aspart medium resistance correction qAC, HS  *ordered diabetes ed to teach pt (likely will need fixed dose insulin) about novolog with meals   *RD will provide nutrition educ materials  *BG qAC, HS 0200  *when pt is NPO- pt should continue with the glargine  *pt was on BID glargine at home, but with addition of aspart injections, he may prefer daily glargine to minimize number of injections    Outpatient follow up for diabetes- prefers to do [facility] for now, then seek care near home in [place], [place]       Discussed w/ pt, RN, primary team           Interval History:   Now on schedule for 1445 tomorrow.  Mateus expresses frustration with all the delays in getting procedure done.    Discussion of his high aspart need relative to glargine, in setting of high carb intake. He is trying to eat low fat as advised.  He asks which foods have carbs.  He is gathering info to better understand the Novolog and how it is added to his glucose control plan.  He's happy to be learning as he goes, rather than on day of discharge. Reflects on  How many health changes he's had in a short time.           PTA diabetes regimen:   Checking BG twice daily: glucose  with occasional 200s  Lantus 14 units in AM around  0800 and in evening sometime between 7587-5925  No Novolog at home    Recent Labs   Lab 07/14/20  1130 07/14/20  0716 07/14/20  0202 07/13/20  2101 07/13/20  1543 07/13/20  1252 07/13/20  1233 07/13/20  0720  07/11/20  0717  07/10/20  0710  07/09/20  0731  07/08/20  0643   GLC  --  98  --   --   --   --   --  98  --  104*  --  173*  --  119*  --  134*   *  --  127* 99 103* 106* 49*  --    < > 104*   < >  --    < >  --    < >  --     < > = values in this interval not displayed.               Review of Systems:   See interval hx          Medications:     Orders Placed This Encounter      Regular Diet Adult               Physical Exam     /72 (BP Location: Left arm)   Pulse 93   Temp 97.8  F (36.6  C) (Oral)   Resp 20   Wt 71.6 kg (157 lb 12.8 oz)   SpO2 98%     General:  pleasant thin man resting in bedin no distress.   HEENT: NC/AT, PER and anicteric, non-injected, oral mucous membranes moist.   Lungs: unlabored respiration, no cough  Skin: warm and dry, no obvious lesions  MSK:  fluid movement of all extremities  Lymp:  no LE edema   Mental status:  alert, oriented x3, communicating clearly  Psych:  calm, even mood    /72 (BP Location: Left arm)   Pulse 93   Temp 97.8  F (36.6  C) (Oral)   Resp 20   Wt 71.6 kg (157 lb 12.8 oz)   SpO2 98%            Data:     Lab Results   Component Value Date    A1C 6.3 07/02/2020            Recent Labs   Lab Test 07/14/20  0716 07/13/20  0720    137   POTASSIUM 3.8 3.8   CHLORIDE 106 105   CO2 24 27   ANIONGAP 8 5   GLC 98 98   BUN 12 11   CR 0.60* 0.63*   BARAK 9.0 9.0     CBC RESULTS:   Recent Labs   Lab Test 07/14/20  0716   WBC 7.7   RBC 3.69*   HGB 10.2*   HCT 34.1*   MCV 92   MCH 27.6   MCHC 29.9*   RDW 17.2*          I spent a total of 35 minutes bedside and on the inpatient unit managing the glycemic care of Norberto Almonte. Over 50% of my time on the unit was spent counseling the patient  and/or coordinating care regarding acute  glucose management, translation to home.  See note for details.    Jyothi Schultz APRN -[id number]  Diabetes Management Team job code: [id number]

## 2020-07-14 NOTE — PLAN OF CARE
/66 (BP Location: Left arm)   Pulse 90   Temp 97.7  F (36.5  C) (Oral)   Resp 16   Wt 71.6 kg (157 lb 12.8 oz)   SpO2 97%     VSS on room air. Up independently. NJ in place, clamped. NPO for possible necrosectomy today. Denies pain at rest. Voiding, not saving. No BM this shift per patient. RLE numbness, no change. Resting between cares. Continue with POC.

## 2020-07-14 NOTE — PROGRESS NOTES
[facility], [place]    Progress Note - Mainor 2 Service        Date of Admission:  7/2/2020    Assessment & Plan   Alexandr Almonte is a 65 yo male with a history of HTN and complicated necrotizing pancreatitis with recent CT findings of a complex cystic structure and air within a worsening WON concerning for infection. He is presenting as a transfer from [facility] for surgical evaluation by GI for the pseudocyst on 07/02/20.     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 Most recent OSH CT imaging (6/28) shows progressive change with a large complex cystic structure in the mid abdomen and a worsening pancreatic pseudocyst with evidence of air concerning for infection. Completed a course of Zosyn for suspected infection of the pseudocyst. GI evaluated the patient and suspects that the gas in the collection is due to fistula and that the pseudocyst is causing gastric outlet obstruction. EUS-guided cystogastrostomy with NJ tube placement (7/4), patient doing well. Repeat imaging 7/9 showed decreased size of the necrotic collection and unchanged biliary dilatation. GI will plan to take to the OR for repeat necrosectomy 7/15.  - GI Panc/Bili consult, appreciate recs  - Creon 4 capsules of 24,000 plus PRN 2 capsules with snacks TID with meals  - Fecal elastase: low  - Tube feeds discontinued, PO trial  - Monitor mag and phos, replete as needed  - Antibiotics:              - None  - Nutrition: calorie counts in place                 - Energy needs: 5137-2584+ kcals/day                 - Protein  needs: 98-113g prtn/day  - Intervention:                 - Endoscopic necrosectomy 7/4                 - Repeat endoscopic necrosectomy on 7/15  - Omeprazole 20 mg PO BID     # SMV & Splenic vein thrombosis  2/2 The patient was initiated on warfarin and most recently on therapeutic dosing of lovenox. On the most recent CTA (6/28), there was no visualization of the thrombosis. No thrombosis was seen on repeat CT (7/9)  - Hold off resuming anticoagulation for now     # Pancreatogenic diabetes  Hgb 6.3 on admission. He was started on his pta dose of insulin glargine 14U BD, but continued to have elevated blood sugars so the dose was increased to 16U BD on 7/07 AM. He was then switched to insulin gtt with mealtime carb correction of 1:20 as sugars were still not adequately controlled. Endocrine was consulted and the patient was allowed to trial PO intake while clamping the NJ tube. Gtt was stopped and the patient was transitioned to glargine 22U BID and novolog 1:10 carb ratio.   - Glargine 22U BID (hold for surgery)  - Mealtime insulin of 1:10 carb correction   - [facility] [place] [facility]   - Hypoglycemic protocol  - Glargine 20U this AM and 14U for PM dose as patient NPO at midnight for procedure tomorrow     # HTN  - hold home lisinopril 10mg daily  - monitor BPs     Diet:   Adult diet, NPO at midnight  Fluids: None  Lines: PIV  DVT Prophylaxis: Low Risk/Ambulatory with no VTE prophylaxis indicated  Teresa Catheter: not present  Code Status:   Full Code         Disposition Plan   Expected discharge: 2 - 3 days, recommended to prior living arrangement once surgical management complete.  Entered: Aminah Zepeda MD 07/14/2020, 8:49 AM       The patient's care was discussed with the Attending Physician, Dr. Valenzuela.    Aminah Zepeda MD  [address], [place]  Pager: [phone]  Please see sticky note for cross cover  information  ______________________________________________________________________    Interval History   No acute events overnight, VSS and afebrile. Patient was not able to be scheduled for the OR last night, and will not be able to go today per OR . Will place him on full diet today, give 20U lantus and 14U tonight with NPO at midnight for scheduled procedure at 2:45 tomorrow. No abdominal pain, notes some bloating, normal BM this morning, no n/v. Has noticed some tingling/numbness of the R foot. Will continue to monitor.     4pt review of systems negative except as indicated in the subjective above.     Data reviewed today: I reviewed all medications, new labs and imaging results over the last 24 hours. I personally reviewed     Physical Exam   Vital Signs: Temp: 97.8  F (36.6  C) Temp src: Oral BP: 135/72 Pulse: 90 Heart Rate: 86 Resp: 16 SpO2: 97 % O2 Device: None (Room air)    Weight: 157 lbs 12.8 oz  GEN: resting comfortably in bed, no acute distress   HEENT: nc/at, EOMI, PERRLA, oral mucosa moist and without lesion  CV: RRR, no m/r/g, 2+ radial pulses b/l  PULM: ctab, bilateral chest expansion, no increased work of breathing, no w/r/r  ABD: soft, non-tender, mildly distended, +bs, no palpable organomegaly  MSK/SKIN: skin warm and well perfused, no rashes, no LE edema  NEURO: alert and oriented x3, no focal deficits, 5/5 bilateral motor strength, some subjective tingling of the R ventral foot but no difference in sensation of light touch on exam    Data   Reviewed.

## 2020-07-14 NOTE — PLAN OF CARE
Assumed Care: 1500 - 2330  Admission/Status: Necrotizing Pancreatitis    VS: /77 (BP Location: Left arm)   Pulse 76   Temp 95.1  F (35.1  C) (Oral)   Resp 16   Wt 71.6 kg (157 lb 12.8 oz)   SpO2 96%  - VSS on RA.  Neuro: Aox4. PERRLA, EOMI. CN 2-12 grossly intact. Moves all extremities spontaneously.  Patient reports RLE numbness/tingling spread along anterior shin/thigh, ambulation provides some relief.  Cardiac: WDL.  Respiratory: WDL, clear.  GI/: Abdomen soft, appropriately tender in LUQ. Sounds audible and normoactive. -BM this shift. Voiding spontaneously, not saving.  Nutrition: NPO until 1700 d/t procedure canceled.  Regular diet, tolerating well.  IV/Drains: PIV SL. NJ at tushar, skin intact.  D5 1/2NS discontinued following PO intake.  Activity: Ambulating in tran frequently.  Pain: Denies.  Skin: Intac ex; R heel wound, scabbed.  Labs: Normoglycemic. Carb coverage and lantus, no sliding scale given.    New this shift: Necrosectomy canceled. NPO at midnight for add-on procedure tomorrow, unknown time. Continue POC.

## 2020-07-15 ENCOUNTER — APPOINTMENT (OUTPATIENT)
Dept: GENERAL RADIOLOGY | Facility: CLINIC | Age: 65
DRG: 405 | End: 2020-07-15
Attending: PEDIATRICS
Payer: OTHER GOVERNMENT

## 2020-07-15 LAB
ABO + RH BLD: NORMAL
ABO + RH BLD: NORMAL
ANION GAP SERPL CALCULATED.3IONS-SCNC: 7 MMOL/L (ref 3–14)
BLD GP AB SCN SERPL QL: NORMAL
BLOOD BANK CMNT PATIENT-IMP: NORMAL
BUN SERPL-MCNC: 11 MG/DL (ref 7–30)
CALCIUM SERPL-MCNC: 8.9 MG/DL (ref 8.5–10.1)
CHLORIDE SERPL-SCNC: 106 MMOL/L (ref 94–109)
CO2 SERPL-SCNC: 25 MMOL/L (ref 20–32)
CREAT SERPL-MCNC: 0.65 MG/DL (ref 0.66–1.25)
ERYTHROCYTE [DISTWIDTH] IN BLOOD BY AUTOMATED COUNT: 17 % (ref 10–15)
GFR SERPL CREATININE-BSD FRML MDRD: >90 ML/MIN/{1.73_M2}
GLUCOSE BLDC GLUCOMTR-MCNC: 102 MG/DL (ref 70–99)
GLUCOSE BLDC GLUCOMTR-MCNC: 163 MG/DL (ref 70–99)
GLUCOSE BLDC GLUCOMTR-MCNC: 231 MG/DL (ref 70–99)
GLUCOSE BLDC GLUCOMTR-MCNC: 232 MG/DL (ref 70–99)
GLUCOSE BLDC GLUCOMTR-MCNC: 76 MG/DL (ref 70–99)
GLUCOSE BLDC GLUCOMTR-MCNC: 87 MG/DL (ref 70–99)
GLUCOSE BLDC GLUCOMTR-MCNC: 90 MG/DL (ref 70–99)
GLUCOSE BLDC GLUCOMTR-MCNC: 93 MG/DL (ref 70–99)
GLUCOSE SERPL-MCNC: 109 MG/DL (ref 70–99)
HCT VFR BLD AUTO: 31.5 % (ref 40–53)
HGB BLD-MCNC: 9.7 G/DL (ref 13.3–17.7)
MCH RBC QN AUTO: 28.4 PG (ref 26.5–33)
MCHC RBC AUTO-ENTMCNC: 30.8 G/DL (ref 31.5–36.5)
MCV RBC AUTO: 92 FL (ref 78–100)
PLATELET # BLD AUTO: 292 10E9/L (ref 150–450)
POTASSIUM SERPL-SCNC: 3.8 MMOL/L (ref 3.4–5.3)
RBC # BLD AUTO: 3.41 10E12/L (ref 4.4–5.9)
SODIUM SERPL-SCNC: 138 MMOL/L (ref 133–144)
SPECIMEN EXP DATE BLD: NORMAL
WBC # BLD AUTO: 7.1 10E9/L (ref 4–11)

## 2020-07-15 PROCEDURE — 25000132 ZZH RX MED GY IP 250 OP 250 PS 637: Performed by: INTERNAL MEDICINE

## 2020-07-15 PROCEDURE — 36000059 ZZH SURGERY LEVEL 3 EA 15 ADDTL MIN UMMC: Performed by: INTERNAL MEDICINE

## 2020-07-15 PROCEDURE — 36415 COLL VENOUS BLD VENIPUNCTURE: CPT | Performed by: STUDENT IN AN ORGANIZED HEALTH CARE EDUCATION/TRAINING PROGRAM

## 2020-07-15 PROCEDURE — 12000001 ZZH R&B MED SURG/OB UMMC

## 2020-07-15 PROCEDURE — C1876 STENT, NON-COA/NON-COV W/DEL: HCPCS | Performed by: INTERNAL MEDICINE

## 2020-07-15 PROCEDURE — 86901 BLOOD TYPING SEROLOGIC RH(D): CPT | Performed by: INTERNAL MEDICINE

## 2020-07-15 PROCEDURE — 37000008 ZZH ANESTHESIA TECHNICAL FEE, 1ST 30 MIN: Performed by: INTERNAL MEDICINE

## 2020-07-15 PROCEDURE — 25000125 ZZHC RX 250: Performed by: NURSE ANESTHETIST, CERTIFIED REGISTERED

## 2020-07-15 PROCEDURE — 25000128 H RX IP 250 OP 636: Performed by: NURSE ANESTHETIST, CERTIFIED REGISTERED

## 2020-07-15 PROCEDURE — 25800030 ZZH RX IP 258 OP 636: Performed by: ANESTHESIOLOGY

## 2020-07-15 PROCEDURE — 0F7D8DZ DILATION OF PANCREATIC DUCT WITH INTRALUMINAL DEVICE, VIA NATURAL OR ARTIFICIAL OPENING ENDOSCOPIC: ICD-10-PCS | Performed by: INTERNAL MEDICINE

## 2020-07-15 PROCEDURE — 25000125 ZZHC RX 250: Performed by: INTERNAL MEDICINE

## 2020-07-15 PROCEDURE — 86900 BLOOD TYPING SEROLOGIC ABO: CPT | Performed by: INTERNAL MEDICINE

## 2020-07-15 PROCEDURE — 37000009 ZZH ANESTHESIA TECHNICAL FEE, EACH ADDTL 15 MIN: Performed by: INTERNAL MEDICINE

## 2020-07-15 PROCEDURE — 99233 SBSQ HOSP IP/OBS HIGH 50: CPT | Mod: GC | Performed by: PEDIATRICS

## 2020-07-15 PROCEDURE — 0FBG8ZZ EXCISION OF PANCREAS, VIA NATURAL OR ARTIFICIAL OPENING ENDOSCOPIC: ICD-10-PCS | Performed by: INTERNAL MEDICINE

## 2020-07-15 PROCEDURE — 80048 BASIC METABOLIC PNL TOTAL CA: CPT | Performed by: STUDENT IN AN ORGANIZED HEALTH CARE EDUCATION/TRAINING PROGRAM

## 2020-07-15 PROCEDURE — 25500064 ZZH RX 255 OP 636: Performed by: INTERNAL MEDICINE

## 2020-07-15 PROCEDURE — 25800030 ZZH RX IP 258 OP 636: Performed by: NURSE ANESTHETIST, CERTIFIED REGISTERED

## 2020-07-15 PROCEDURE — 0FPD8DZ REMOVAL OF INTRALUMINAL DEVICE FROM PANCREATIC DUCT, VIA NATURAL OR ARTIFICIAL OPENING ENDOSCOPIC: ICD-10-PCS | Performed by: INTERNAL MEDICINE

## 2020-07-15 PROCEDURE — 36000061 ZZH SURGERY LEVEL 3 W FLUORO 1ST 30 MIN - UMMC: Performed by: INTERNAL MEDICINE

## 2020-07-15 PROCEDURE — 86850 RBC ANTIBODY SCREEN: CPT | Performed by: INTERNAL MEDICINE

## 2020-07-15 PROCEDURE — 40000279 XR SURGERY CARM FLUORO GREATER THAN 5 MIN W STILLS: Mod: TC

## 2020-07-15 PROCEDURE — 40000170 ZZH STATISTIC PRE-PROCEDURE ASSESSMENT II: Performed by: INTERNAL MEDICINE

## 2020-07-15 PROCEDURE — 85027 COMPLETE CBC AUTOMATED: CPT | Performed by: STUDENT IN AN ORGANIZED HEALTH CARE EDUCATION/TRAINING PROGRAM

## 2020-07-15 PROCEDURE — 27210794 ZZH OR GENERAL SUPPLY STERILE: Performed by: INTERNAL MEDICINE

## 2020-07-15 PROCEDURE — C1769 GUIDE WIRE: HCPCS | Performed by: INTERNAL MEDICINE

## 2020-07-15 PROCEDURE — 25000566 ZZH SEVOFLURANE, EA 15 MIN: Performed by: INTERNAL MEDICINE

## 2020-07-15 PROCEDURE — 0F9G8ZZ DRAINAGE OF PANCREAS, VIA NATURAL OR ARTIFICIAL OPENING ENDOSCOPIC: ICD-10-PCS | Performed by: INTERNAL MEDICINE

## 2020-07-15 PROCEDURE — 71000014 ZZH RECOVERY PHASE 1 LEVEL 2 FIRST HR: Performed by: INTERNAL MEDICINE

## 2020-07-15 PROCEDURE — 99207 ZZC CDG-MDM COMPONENT: MEETS MODERATE - UP CODED: CPT | Performed by: PEDIATRICS

## 2020-07-15 PROCEDURE — 00000146 ZZHCL STATISTIC GLUCOSE BY METER IP

## 2020-07-15 PROCEDURE — C1726 CATH, BAL DIL, NON-VASCULAR: HCPCS | Performed by: INTERNAL MEDICINE

## 2020-07-15 DEVICE — STENT SOLUS BILIARY 10FRX07CM DBL PIGTAIL W/INTRO G25673
Type: IMPLANTABLE DEVICE | Site: ESOPHAGUS | Status: NON-FUNCTIONAL
Removed: 2020-08-06

## 2020-07-15 RX ORDER — HYDROMORPHONE HYDROCHLORIDE 1 MG/ML
.3-.5 INJECTION, SOLUTION INTRAMUSCULAR; INTRAVENOUS; SUBCUTANEOUS EVERY 5 MIN PRN
Status: DISCONTINUED | OUTPATIENT
Start: 2020-07-15 | End: 2020-07-15 | Stop reason: HOSPADM

## 2020-07-15 RX ORDER — IOPAMIDOL 510 MG/ML
INJECTION, SOLUTION INTRAVASCULAR PRN
Status: DISCONTINUED | OUTPATIENT
Start: 2020-07-15 | End: 2020-07-15 | Stop reason: HOSPADM

## 2020-07-15 RX ORDER — NALOXONE HYDROCHLORIDE 0.4 MG/ML
.1-.4 INJECTION, SOLUTION INTRAMUSCULAR; INTRAVENOUS; SUBCUTANEOUS
Status: DISCONTINUED | OUTPATIENT
Start: 2020-07-15 | End: 2020-07-16 | Stop reason: HOSPADM

## 2020-07-15 RX ORDER — FENTANYL CITRATE 50 UG/ML
INJECTION, SOLUTION INTRAMUSCULAR; INTRAVENOUS PRN
Status: DISCONTINUED | OUTPATIENT
Start: 2020-07-15 | End: 2020-07-15

## 2020-07-15 RX ORDER — SODIUM CHLORIDE, SODIUM LACTATE, POTASSIUM CHLORIDE, CALCIUM CHLORIDE 600; 310; 30; 20 MG/100ML; MG/100ML; MG/100ML; MG/100ML
INJECTION, SOLUTION INTRAVENOUS CONTINUOUS PRN
Status: DISCONTINUED | OUTPATIENT
Start: 2020-07-15 | End: 2020-07-15

## 2020-07-15 RX ORDER — ONDANSETRON 4 MG/1
4 TABLET, ORALLY DISINTEGRATING ORAL EVERY 30 MIN PRN
Status: DISCONTINUED | OUTPATIENT
Start: 2020-07-15 | End: 2020-07-15 | Stop reason: HOSPADM

## 2020-07-15 RX ORDER — LIDOCAINE HYDROCHLORIDE 20 MG/ML
INJECTION, SOLUTION INFILTRATION; PERINEURAL PRN
Status: DISCONTINUED | OUTPATIENT
Start: 2020-07-15 | End: 2020-07-15

## 2020-07-15 RX ORDER — LABETALOL 20 MG/4 ML (5 MG/ML) INTRAVENOUS SYRINGE
10
Status: DISCONTINUED | OUTPATIENT
Start: 2020-07-15 | End: 2020-07-15 | Stop reason: HOSPADM

## 2020-07-15 RX ORDER — ONDANSETRON 2 MG/ML
INJECTION INTRAMUSCULAR; INTRAVENOUS PRN
Status: DISCONTINUED | OUTPATIENT
Start: 2020-07-15 | End: 2020-07-15

## 2020-07-15 RX ORDER — CIPROFLOXACIN 2 MG/ML
INJECTION, SOLUTION INTRAVENOUS PRN
Status: DISCONTINUED | OUTPATIENT
Start: 2020-07-15 | End: 2020-07-15

## 2020-07-15 RX ORDER — ONDANSETRON 2 MG/ML
4 INJECTION INTRAMUSCULAR; INTRAVENOUS EVERY 30 MIN PRN
Status: DISCONTINUED | OUTPATIENT
Start: 2020-07-15 | End: 2020-07-15 | Stop reason: HOSPADM

## 2020-07-15 RX ORDER — PROPOFOL 10 MG/ML
INJECTION, EMULSION INTRAVENOUS PRN
Status: DISCONTINUED | OUTPATIENT
Start: 2020-07-15 | End: 2020-07-15

## 2020-07-15 RX ORDER — FENTANYL CITRATE 50 UG/ML
25-50 INJECTION, SOLUTION INTRAMUSCULAR; INTRAVENOUS
Status: DISCONTINUED | OUTPATIENT
Start: 2020-07-15 | End: 2020-07-15 | Stop reason: HOSPADM

## 2020-07-15 RX ORDER — SODIUM CHLORIDE, SODIUM LACTATE, POTASSIUM CHLORIDE, CALCIUM CHLORIDE 600; 310; 30; 20 MG/100ML; MG/100ML; MG/100ML; MG/100ML
INJECTION, SOLUTION INTRAVENOUS CONTINUOUS
Status: DISCONTINUED | OUTPATIENT
Start: 2020-07-15 | End: 2020-07-15 | Stop reason: HOSPADM

## 2020-07-15 RX ORDER — DEXAMETHASONE SODIUM PHOSPHATE 4 MG/ML
INJECTION, SOLUTION INTRA-ARTICULAR; INTRALESIONAL; INTRAMUSCULAR; INTRAVENOUS; SOFT TISSUE PRN
Status: DISCONTINUED | OUTPATIENT
Start: 2020-07-15 | End: 2020-07-15

## 2020-07-15 RX ADMIN — PANCRELIPASE 96000 UNITS: 24000; 76000; 120000 CAPSULE, DELAYED RELEASE PELLETS ORAL at 18:45

## 2020-07-15 RX ADMIN — SODIUM CHLORIDE, POTASSIUM CHLORIDE, SODIUM LACTATE AND CALCIUM CHLORIDE: 600; 310; 30; 20 INJECTION, SOLUTION INTRAVENOUS at 14:01

## 2020-07-15 RX ADMIN — FENTANYL CITRATE 50 MCG: 50 INJECTION, SOLUTION INTRAMUSCULAR; INTRAVENOUS at 14:03

## 2020-07-15 RX ADMIN — ROCURONIUM BROMIDE 10 MG: 10 INJECTION INTRAVENOUS at 14:34

## 2020-07-15 RX ADMIN — DEXAMETHASONE SODIUM PHOSPHATE 4 MG: 4 INJECTION, SOLUTION INTRA-ARTICULAR; INTRALESIONAL; INTRAMUSCULAR; INTRAVENOUS; SOFT TISSUE at 14:29

## 2020-07-15 RX ADMIN — PROPOFOL 40 MG: 10 INJECTION, EMULSION INTRAVENOUS at 15:18

## 2020-07-15 RX ADMIN — FENTANYL CITRATE 50 MCG: 50 INJECTION, SOLUTION INTRAMUSCULAR; INTRAVENOUS at 15:00

## 2020-07-15 RX ADMIN — LIDOCAINE HYDROCHLORIDE 100 MG: 20 INJECTION, SOLUTION INFILTRATION; PERINEURAL at 14:09

## 2020-07-15 RX ADMIN — PROPOFOL 70 MG: 10 INJECTION, EMULSION INTRAVENOUS at 14:09

## 2020-07-15 RX ADMIN — FENTANYL CITRATE 50 MCG: 50 INJECTION, SOLUTION INTRAMUSCULAR; INTRAVENOUS at 14:37

## 2020-07-15 RX ADMIN — ROCURONIUM BROMIDE 10 MG: 10 INJECTION INTRAVENOUS at 15:00

## 2020-07-15 RX ADMIN — ROCURONIUM BROMIDE 50 MG: 10 INJECTION INTRAVENOUS at 14:09

## 2020-07-15 RX ADMIN — CIPROFLOXACIN 400 MG: 2 INJECTION INTRAVENOUS at 14:16

## 2020-07-15 RX ADMIN — ROCURONIUM BROMIDE 10 MG: 10 INJECTION INTRAVENOUS at 15:32

## 2020-07-15 RX ADMIN — SUGAMMADEX 200 MG: 100 INJECTION, SOLUTION INTRAVENOUS at 15:40

## 2020-07-15 RX ADMIN — ONDANSETRON 4 MG: 2 INJECTION INTRAMUSCULAR; INTRAVENOUS at 15:36

## 2020-07-15 RX ADMIN — FENTANYL CITRATE 50 MCG: 50 INJECTION, SOLUTION INTRAMUSCULAR; INTRAVENOUS at 15:34

## 2020-07-15 RX ADMIN — OMEPRAZOLE 20 MG: 20 CAPSULE, DELAYED RELEASE ORAL at 18:45

## 2020-07-15 ASSESSMENT — ACTIVITIES OF DAILY LIVING (ADL)
ADLS_ACUITY_SCORE: 11
ADLS_ACUITY_SCORE: 12
ADLS_ACUITY_SCORE: 11

## 2020-07-15 NOTE — PLAN OF CARE
Transfer from [facility] for surgical evaluation by GI for the pseudocyst on 07/02/20  POD #0 Necrosectomy, NJ removed --- refused CAPNO      Pain: denies.   Nausea: denies   Lab: BS Q4- 109, 87, 90, 76   BP (!) 147/72 (BP Location: Left arm)   Pulse 84   Temp 97  F (36.1  C) (Oral)   Resp 14   Wt 71.6 kg (157 lb 12.8 oz)   SpO2 95%   Output: Voiding, not saving   Diet: Regular  Activity: UAL   Bowel Function: Bowel sounds heard in all quadrants, passing flatus, bm x1 this shift.   Lines: L PIV,  saline locked, dressing CDI   Skin: L posterior heel resolving wound  Additional notes: Intermittent R foot numbness, team aware  Plan: Continue to monitor pain, nausea, vs, output, and bowel function

## 2020-07-15 NOTE — PROGRESS NOTES
Admitted/transferred from: PACU  2 RN skin assessment completed by BUNNY MONREAL RN and Rachel Nina   Skin assessment finding: L posterior resolving wound- present prior to OR   Interventions/actions: No new interventions at this time.   Will continue to monitor.

## 2020-07-15 NOTE — PLAN OF CARE
AOx4. UAL with cane, ambulating halls. AVSS on ra. Tolerating reg diet. NJ clamped. NPO at midnight for OR tomorrow for necrosectomy that is planned at 1445. + flatus, BM x1 on days. Denies N/V. Voiding but not saving. Denies pain. PIV x2 SL. BG monitored and sliding scale insulin + carb coverage needed this humberto. Pt said he will wake up at 5 AM to shower tomorrow AM and do a scrub before going down to OR. Cont POC.    Addendum: pt BG 70 at bedtime check, PO intake encourage, recheck came back WDL.

## 2020-07-15 NOTE — ANESTHESIA POSTPROCEDURE EVALUATION
Anesthesia POST Procedure Evaluation    Patient: Norberto Almonte   MRN:     [id number] Gender:   male   Age:    64 year old :      1955        Preoperative Diagnosis: Necrotizing pancreatitis [K85.91]   Procedure(s):  ESOPHAGOGASTRODUODENOSCOPY  WITH ENDOSCOPIC EXCISION OF NECROTIC PANCREATIC TISSUE, STENT EXCHANGE, NJ TUBE REMOVAL   Postop Comments: No value filed.     Anesthesia Type: General       Disposition: Admission   Postop Pain Control: Uneventful            Sign Out: Well controlled pain   PONV: No   Neuro/Psych: Uneventful            Sign Out: Acceptable/Baseline neuro status   Airway/Respiratory: Uneventful            Sign Out: Acceptable/Baseline resp. status   CV/Hemodynamics: Uneventful            Sign Out: Acceptable CV status   Other NRE: NONE   DID A NON-ROUTINE EVENT OCCUR? No         Last Anesthesia Record Vitals:  CRNA VITALS  7/15/2020 1514 - 7/15/2020 1554      7/15/2020             Pulse:  93    SpO2:  100 %    Resp Rate (observed):  (!) 1          Last PACU Vitals:  Vitals Value Taken Time   /79 7/15/2020  3:50 PM   Temp     Pulse 87 7/15/2020  3:50 PM   Resp     SpO2 100 % 7/15/2020  3:53 PM   Temp src     NIBP     Pulse     SpO2     Resp     Temp     Ht Rate     Temp 2     Vitals shown include unvalidated device data.      Electronically Signed By: Ervin Villalta MD, July 15, 2020, 3:54 PM

## 2020-07-15 NOTE — PROGRESS NOTES
[facility], [place]    Progress Note - Mainor 2 Service        Date of Admission:  7/2/2020    Assessment & Plan   Alexandr Almonte is a 63 yo male with a history of HTN and complicated necrotizing pancreatitis with recent CT findings of a complex cystic structure and air within a worsening WON concerning for infection. He is presenting as a transfer from [facility] for surgical evaluation by GI for the pseudocyst on 07/02/20.     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 Most recent OSH CT imaging (6/28) shows progressive change with a large complex cystic structure in the mid abdomen and a worsening pancreatic pseudocyst with evidence of air concerning for infection. Completed a course of Zosyn for suspected infection of the pseudocyst. GI evaluated the patient and suspects that the gas in the collection is due to fistula and that the pseudocyst is causing gastric outlet obstruction. EUS-guided cystogastrostomy with NJ tube placement (7/4), patient doing well. Tube feeds were initiated and subsequently discontinued as the patient was able to PO sufficiently. Repeat imaging 7/9 showed decreased size of the necrotic collection and unchanged biliary dilatation. GI will plan to take to the OR for repeat necrosectomy 7/15.  - GI Panc/Bili consult, appreciate recs  - Creon 4 capsules of 24,000 plus PRN 2 capsules with snacks TID with meals  - Fecal elastase: low  - Monitor mag and phos, replete as needed  - Antibiotics:              - None  -  Nutrition: calorie counts in place                 - Energy needs: 3902-5486+ kcals/day                 - Protein needs: 98-113g prtn/day  - Intervention:                 - Endoscopic necrosectomy 7/4                 - Repeat endoscopic necrosectomy on 7/15  - Omeprazole 20 mg PO BID     # SMV & Splenic vein thrombosis  2/2 The patient was initiated on warfarin and most recently on therapeutic dosing of lovenox. On the most recent CTA (6/28), there was no visualization of the thrombosis. No thrombosis was seen on repeat CT (7/9)  - Hold off resuming anticoagulation for now     # Pancreatogenic diabetes  Hgb 6.3 on admission. He was started on his pta dose of insulin glargine 14U BD, but continued to have elevated blood sugars so the dose was increased to 16U BD on 7/07 AM. He was then switched to insulin gtt with mealtime carb correction of 1:20 as sugars were still not adequately controlled. Endocrine was consulted and the patient was allowed to trial PO intake while clamping the NJ tube. Gtt was stopped and the patient was transitioned to glargine 20U daily at 1130 with insulin aspart 1:10 carb correction for all meals other than dinner which will be a carb correction of 1:13.  - Daily glargine 20U at 1130  - Mealtime insulin of 1:10 carb correction with a 1:13 carb correction for dinner only  - [facility]   - Will follow up with [facility] endocrine clinic once discharged, request has already been placed     # HTN  - hold home lisinopril 10mg daily  - monitor BPs     Diet:   NPO for procedure today  Fluids: None  Lines: PIV  DVT Prophylaxis: Low Risk/Ambulatory with no VTE prophylaxis indicated  Teresa Catheter: not present  Code Status:            Disposition Plan   Expected discharge: 1-2d, recommended to prior living arrangement once surgical management of pt per GI complete.  Entered: Aminah Zepeda MD 07/15/2020, 12:00 PM       The patient's care was discussed with the Attending Physician, Dr. Valenzuela.    Aminah  MD Mainor Zepeda 2 Service  [facility], [place]  Pager: [phone]  Please see sticky note for cross cover information  ______________________________________________________________________    Interval History   No acute events overnight. VSS and afebrile. Pt NPO for procedure today. C/o some bloating and mild abdominal discomfort that are described as gas pains. He has no n/v, last BM last night and normal. Pt is eagerly anticipating surgery and discharge home in the next few days.     4pt review of systems negative except as indicated in the subjective above.     Data reviewed today: I reviewed all medications, new labs and imaging results over the last 24 hours.     Physical Exam   Vital Signs: Temp: 98.1  F (36.7  C) Temp src: Oral BP: (!) 142/73 Pulse: 93 Heart Rate: 87 Resp: 16 SpO2: 98 % O2 Device: None (Room air)    Weight: 157 lbs 12.8 oz  GEN: resting comfortably in bed, no acute distress   HEENT: nc/at, EOMI, PERRLA, oral mucosa moist and without lesion  CV: RRR, no m/r/g, 2+ radial pulses b/l  PULM: ctab, bilateral chest expansion, no increased work of breathing, no w/r/r  ABD: soft, mildly tender to palpation in epigastric region, moderately distended, +bs, no palpable organomegaly  MSK/SKIN: skin warm and well perfused, no rashes, no LE edema  NEURO: alert and oriented x3, no focal deficits, 5/5 bilateral motor strength    Data   Reviewed.

## 2020-07-15 NOTE — ADDENDUM NOTE
Addendum  created 07/15/20 1559 by Farooq Chambers APRN CRNA    Clinical Note Signed, Flowsheet data copied forward, Intraprocedure Flowsheets edited, Intraprocedure Meds edited

## 2020-07-15 NOTE — PROGRESS NOTES
Diabetes Consult Daily  Progress Note          Assessment/Plan:   64-year-old man with newly diagnosed diabetes (since April 2020) in the setting of acute pancreatitis (April 2020) complicated by necrotizing pancreatitis.  Admitted on 7/2/2020 for surgical evaluation by GI for cirrhosis.  Admission hemoglobin A1c of 6.2.  Tube feeds on 7/ 4 and reached goal at 7/6/2020--> regular diet only since 7/11.    BG below target following supper aspart last night, then stable overnight.    PLAN  1.  Diabetes with hx of pancreatitis/necrotizing pancreatitis    *glargine 20 units q24h at 1130,  Discontinue evening dose.  Plan for once daily dosing at home  *decrease aspart from 1 per 10 gram to 1 per 13 grams  at supper, 1 per 10 grams rest of day  *aspart medium resistance correction qAC, HS (this will not be included on discharge)  *ordered diabetes ed to teach pt (likely will need fixed dose insulin) about novolog with meals --> updated on timeline to discharge  *RD provided nutrition educ materials  *BG qAC, HS 0200  *when pt is NPO- pt should continue with the glargine  *sample diabetes schedule and low carb snack list given to pt to review (also pasted into AVS)    Outpatient follow up for diabetes- prefers to do [facility] for now (appt request sent), then seek care near home in [place], [place]       Discussed w/ pt, RN, page to primary team and CDE           Interval History:   on schedule for 1445 necrosectomy today.  NPO since midnight again  Ate twice yesterday, once procedure canceled.  BG 70,  2 hrs after aspart given last night.  Drank juice before bedtime.  Reviewed options for hypoglycemia treatment at home-- he dislikes apple juice.  He's reading the carb booklet, making notes.    Discussed insulin schedule, timing of glargine for home.  Says he's learning, needs repetition, needs written materials.    No IVF at present.  With glargine 30 units on board, BG low to mid 100s overnight.                Family Medicine Office Visit    HISTORY OF PRESENT ILLNESS:  Pt is here today with her mother with two main concerns. First, she was found to have a large ovarian cyst causing pain when she was in the ED a few weeks ago. Multiple women in her family have needed cysts surgically removed, and her pelvic discomfort, though not as severe, is still there. She would like to make sure this cyst is going away, and then would like to try some sort of hormonal contraceptive to see if this prevents further cysts from forming. She cannot remember to take OCPs, so that's not a great option for her. She'd actually really like to get the arm implant.    PAST MEDICAL HISTORY:    Chronic tonsillitis                                           Menorrhagia                                                   Otorrhea of right ear                           9/20/2012     GERD (gastroesophageal reflux disease)                        Acne                                                          Constipation                                                  ALLERGIES:  No Known Allergies  Social History     Socioeconomic History   • Marital status: Single     Spouse name: Not on file   • Number of children: Not on file   • Years of education: Not on file   • Highest education level: Not on file   Occupational History   • Not on file   Social Needs   • Financial resource strain: Not on file   • Food insecurity:     Worry: Not on file     Inability: Not on file   • Transportation needs:     Medical: Not on file     Non-medical: Not on file   Tobacco Use   • Smoking status: Passive Smoke Exposure - Never Smoker   • Smokeless tobacco: Never Used   Substance and Sexual Activity   • Alcohol use: No   • Drug use: No   • Sexual activity: Not on file   Lifestyle   • Physical activity:     Days per week: Not on file     Minutes per session: Not on file   • Stress: Not on file   Relationships   • Social connections:     Talks on phone: Not on file     Gets  PTA diabetes regimen:   Checking BG twice daily: glucose  with occasional 200s  Lantus 14 units in AM around 0800 and in evening sometime between 3675-7035  No Novolog at home    Recent Labs   Lab 07/15/20  0721 07/15/20  0154 07/14/20  2224 07/14/20  2200 07/14/20  2139 07/14/20  1808 07/14/20  1130 07/14/20  0716  07/13/20  0720  07/11/20  0717  07/10/20  0710  07/09/20  0731   *  --   --   --   --   --   --  98  --  98  --  104*  --  173*  --  119*   BGM  --  163* 102* 93 70 152* 114*  --    < >  --    < > 104*   < >  --    < >  --     < > = values in this interval not displayed.               Review of Systems:   See interval hx          Medications:     Orders Placed This Encounter      Regular Diet Adult               Physical Exam     /77 (BP Location: Left arm)   Pulse 93   Temp 96.8  F (36  C) (Oral)   Resp 16   Wt 71.6 kg (157 lb 12.8 oz)   SpO2 96%     General:  pleasant thin man resting in bedin no distress.   HEENT: NC/AT, PER and anicteric, non-injected, oral mucous membranes moist. NJ feeding tube remains in place  Lungs: unlabored respiration, no cough  Skin: warm and dry, no obvious lesions  MSK:  fluid movement of all extremities  Lymp:  no LE edema   Mental status:  alert, oriented x3, communicating clearly  Psych:  calm, even mood    /77 (BP Location: Left arm)   Pulse 93   Temp 96.8  F (36  C) (Oral)   Resp 16   Wt 71.6 kg (157 lb 12.8 oz)   SpO2 96%            Data:     Lab Results   Component Value Date    A1C 6.3 07/02/2020            Recent Labs   Lab Test 07/15/20  0721 07/14/20  0716    138   POTASSIUM 3.8 3.8   CHLORIDE 106 106   CO2 25 24   ANIONGAP 7 8   * 98   BUN 11 12   CR 0.65* 0.60*   BARAK 8.9 9.0     CBC RESULTS:   Recent Labs   Lab Test 07/15/20  0721   WBC 7.1   RBC 3.41*   HGB 9.7*   HCT 31.5*   MCV 92   MCH 28.4   MCHC 30.8*   RDW 17.0*      I spent a total of 35 minutes bedside and on the inpatient unit managing the  together: Not on file     Attends Mosque service: Not on file     Active member of club or organization: Not on file     Attends meetings of clubs or organizations: Not on file     Relationship status: Not on file   • Intimate partner violence:     Fear of current or ex partner: Not on file     Emotionally abused: Not on file     Physically abused: Not on file     Forced sexual activity: Not on file   Other Topics Concern   • Not on file   Social History Narrative   • Not on file         REVIEW OF SYSTEMS:   Constitutional:  Negative for fevers, chills  Gastrointestinal: Negative for nausea, vomiting, constipation, diarrhea  : Positive for pelvic pain. Negative for dysuria, hematuria, abnormal vaginal discharge or irritation.    PHYSICAL EXAMINATION:   Visit Vitals  /64   Pulse 68   Resp 16   Ht 5' 2\" (1.575 m)   Wt 64.8 kg   LMP 07/05/2019 (Exact Date)   BMI 26.14 kg/m²     General:  Well-developed, well-nourished, no acute distress.  Eyes: Normal lids, conjunctivae clear bilaterally. PERRL(Pupils equal, round, reactive to light).  ENT: External ears normal, nares symmetric, no discharge. Moist oropharyngeal mucous membranes.  Skin: Warm, no rashes or lesions. No palpable nodules.      ASSESSMENT AND PLAN:  1. L ovarian cyst - will get an US to make sure this has resolved.   2. Contraceptive management - Discussed options, she'd like to get a nexplanon placed. Discussed side effects, possible bleeding patterns with the nexplanon (none, irregular, regular menses). Discussed she may still get cysts with this, but she'd like to try it. Will get preg test ahead of time, advised to use barrier contraception or abstinence in the meantime    Follow up: for Nexplanon placement    Shruthi Prajapati MD  7/14/2019     glycemic care of Norberto Almonte. Over 50% of my time on the unit was spent counseling the patient  and/or coordinating care regarding acute diabetes mgmnt and planning for home.  See note for details.      Jyothi Schultz APRN [id number]  Diabetes Management Team job code: [phone]

## 2020-07-15 NOTE — PROGRESS NOTES
CLINICAL NUTRITION SERVICES - BRIEF NOTE  *See RD note on 7/10 for last nutrition reassessment details and brief note 7/14 for diet education provided    Calorie Counts  7/13: 2 meals ordered from kitchen, but no food intake recorded  7/12: 1577 kcal and 72 g protein (73% kcal needs and 77% protein needs)  7/11: 906 kcal and 42 g protein  (42% kcal needs and 44% protein needs)  7/9: 794 kcal and 34 g protein  (37% kcal needs and 36% protein needs)  7/8: 1140 kcal and 56 g protein  (53% kcal needs and 60% protein needs)    TF stopped since 7/10, but NJ remains in place.  Per conversation with patient yesterday, his appetite has been good/improving and is tolerating food.    Per chart, plan for necrosectomy today.     Recommendations: if appetite declines, consider restarting calorie counts       INTERVENTIONS  Implementation  Chart review      Monitoring/Evaluation  Progress toward goals will be monitored and evaluated per protocol.     Lauren Carrasco RD, LD  7C RD pager: [phone]

## 2020-07-15 NOTE — PROGRESS NOTES
GASTROENTEROLOGY PROGRESS NOTE    Date: 07/15/2020     ASSESSMENT:  64-year-old man with a history of idiopathic pancreatitis and recent hospital stay in April complicated by ileus, SMV thrombosis, and splenic vein thrombosis.  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  There was possibility of air inside the pseudocyst which was thought either to be due to visualization of pseudocyst with bowel or an infection.  He underwent EUS guided transluminal drainage (cystgastrostomy) with placement of 15 mm AXIOS stent as well as 10 Uzbek by 3 cm double-pigtail Solus stent on 7/2.      Summary:  -- Etiology: Idiopathic  -- Date of onset: 4/28  -- BISAP score: Not available  -- Fluid collection               -- Infected:  Possibility due to presence of air               -- Abx: Zosyn (completed)  -- Concurrent organ failure: N/A  -- Nutrition:    --NJ tube: Placed during procedure on 7/2               -- GJ Tube: N/A               -- PERT: Y  -- Necrosectomy- initial drainage on 7/2  -- Next Necrosectomy: 7/15  -- Last CT: 6/28  -- Interventions:  Initial drainage 7/2  -- Drains: N/A  -- Thrombosis:  Known SMV and splenic vein thrombus, not on anticoagulation  -- Surgery consult: N    RECOMMENDATIONS:  -Plan for necrosectomy,  After this procedure,  will reassess the patient, may be able to discharge the next day or two with outpatient follow-up for additional necrosectomies.  GI will arrange this    Further recommendations after necrosectomy    Gastroenterology follow up recommendations: Pending clinical course.      Thank you for involving us in this patient's care. Please do not hesitate to contact the GI service with any questions or concerns.      Pt care plan discussed with Dr. Stevens, GI staff physician.    Kwesi SYED MD  Gastroenterology Fellow  Division of Gastroenterology, Hepatology and Nutrition  [facility]  [place]    _______________________________________________________________    Subjective  Patient in procedure today      Objective:  Blood pressure 133/77, pulse 93, temperature 96.8  F (36  C), temperature source Oral, resp. rate 16, weight 71.6 kg (157 lb 12.8 oz), SpO2 96 %.    Gen: A&Ox3, NAD  HEENT: ncat, perrl, eomi, sclera anicteric.  NG tube in place.  CV: RRR  Lungs: CTA b/l  Abd:  +bs, soft, nd/nt  Skin: no jaundice, no stigmata of chronic liver disease  MS: appropriate muscle mass for age  Neuro: non focal       LABS:  BMP  Recent Labs   Lab 07/15/20  0721 07/14/20  0716 07/13/20  0720 07/12/20  1611 07/11/20  0717    138 137  --  138   POTASSIUM 3.8 3.8 3.8  --  3.8   CHLORIDE 106 106 105  --  107   BARAK 8.9 9.0 9.0  --  9.0   CO2 25 24 27  --  26   BUN 11 12 11 15 18   CR 0.65* 0.60* 0.63*  --  0.64*   * 98 98  --  104*     CBC  Recent Labs   Lab 07/15/20  0721 07/14/20  0716 07/13/20  0720 07/12/20  1611   WBC 7.1 7.7 8.4 9.8   RBC 3.41* 3.69* 3.64* 3.57*   HGB 9.7* 10.2* 10.2* 10.2*   HCT 31.5* 34.1* 34.2* 33.3*   MCV 92 92 94 93   MCH 28.4 27.6 28.0 28.6   MCHC 30.8* 29.9* 29.8* 30.6*   RDW 17.0* 17.2* 17.2* 17.4*    328 324 377     INR  Recent Labs   Lab 07/12/20  1611   INR 1.04     LFTs  Recent Labs   Lab 07/14/20  0716 07/13/20  0720 07/11/20  0717 07/10/20  0710   ALKPHOS 147 159* 200* 239*   AST 16 19 27 36   ALT 22 24 34 37   BILITOTAL 0.7 1.3 0.8 1.4*   PROTTOTAL 7.5 7.7 7.4 7.4   ALBUMIN 2.6* 2.6* 2.4* 2.4*      PANCNo lab results found in last 7 days.    IMAGING:  reviewed

## 2020-07-15 NOTE — OP NOTE
Upper GI Endoscopy  07/15/2020  1:44 PM  [address] [place] [address] [phone]     Endoscopy Department   _______________________________________________________________________________   Patient Name: Norberto Almonte          Procedure Date: 7/15/2020 1:44 PM   MRN: [id number]                       Account Number: [id number]   YOB: 1955             Admit Type: Inpatient   Age: 64                                Gender: Male   Note Status: Finalized                Attending MD: Segun Prakash MD   Total Sedation Time:                     _______________________________________________________________________________       Procedure:           Upper GI endoscopy   Indications:         Walled off necrosis post endoscopic transluminal drainage   Providers:           Segun Prakash MD   Patient Profile:     Mr Almonte mary 65yo gentleman with idiopathic acute                        pancreatitis in May which progressed to walled off                        necrosis now status post endoscopic transluminal                        drainage the first week of July. He has more recently                        been tolerating oral intake and wants his NJ removed. He                        proceeds to upper endoscopy for necrosectomy.   Referring MD:        Jyothi Dill MD   Requesting Provider: César Almaguer MD   Medicines:           General Anesthesia, Cipro 400 mg IV   Complications:       No immediate complications.   _______________________________________________________________________________   Procedure:           Pre-Anesthesia Assessment:                        - Prior to the procedure, a History and Physical was                        performed, and patient medications and allergies were                        reviewed. The patient is competent. The risks and                        benefits of the procedure and the sedation options  and                        risks were discussed with the patient. All questions                        were answered and informed consent was obtained. Patient                        identification and proposed procedure were verified by                        the nurse in the pre-procedure area. Mental Status                        Examination: alert and oriented. Airway Examination:                        Mallampati Class II (the uvula but not tonsillar pillars                        visualized). Respiratory Examination: clear to                        auscultation. CV Examination: normal. ASA Grade                        Assessment: II - A patient with mild systemic disease.                        After reviewing the risks and benefits, the patient was                        deemed in satisfactory condition to undergo the                        procedure. The anesthesia plan was to use general                        anesthesia. Immediately prior to administration of                        medications, the patient was re-assessed for adequacy to                        receive sedatives. The heart rate, respiratory rate,                        oxygen saturations, blood pressure, adequacy of                        pulmonary ventilation, and response to care were                        monitored throughout the procedure. The physical status                        of the patient was re-assessed after the procedure.                        After obtaining informed consent, the endoscope was                        passed under direct vision. Throughout the procedure,                        the patient's blood pressure, pulse, and oxygen                        saturations were monitored continuously. The gastroscope                        was introduced through the mouth, and advanced to the                        second part of duodenum. The upper GI endoscopy was                        accomplished without difficulty.  The patient tolerated                        the procedure well.                                                                                     Findings:        The patient was supine throughout.  films demonstrated a Solus        stent within a widely expanded Axios stent. Endoscopically, the        esophagus was unremarkable as was the stomach save for the Axios stent        well seated and expanded across the posterior wall of the antrum. Within        this Axios was a Solus stent. The duodenum was edematous and narrowed        from bulb to second portion. The Solus stent and later the Axios were        revoved using a rat toothed forceps. This revealed a mature        cystogastrostomy tract leading to a large cavity occupied with solid        necrosis. We then began the process of necrosectomy using both        Captivator snares and a 15mm occlusion balloon over a wire (Mod 22).        Contrast injected assisted in delineating the extent of the cavity. A        large amount of solid necrosis was removed and a large amount remained.        Two 10F Solus stents were then deployed, one 7cm to the patient's left        and one 3cm to the patient's right, spanning the collection.                                                                                     Impression:          - Uncomplicated removal of NJ, not replaced based on                        patient's request and suspicion that he is tolerating at                        least liquid orals despite edematous duodenal sweep                        - Uncomplicated removal of the well seated, fully                        expanded Axios and coaxial Solus traversing the                        posterior antral wal                        - Mature cystogastrostomy leading to a large mid-abdomen                        compartment of solid necrosis                        - Endoscopic necrosectomy successful in removing a large                         amount of solid necrosis, however a large amount remained                        - Successful placement of two 10F Solus stents as above   Recommendation:      - General anesthesia recovery with return to the floor                        when appropriate                        - Repeat necrosectomy (Dr Almaguer, Dr Prakash if Dr Almaguer                        not available) in 2-4 weeks with IV contasted CT of the                        abdomen just before the procedure to help guide                        debridement                        - Antithrombotics may resume in 48h; all other                        medications and diet may resume without delay                        - The findings and recommendations were discussed with                        the patient and their family                                                                                       electronically signed by KRISTAL Prakash

## 2020-07-15 NOTE — PLAN OF CARE
/59 (BP Location: Left arm)   Pulse 93   Temp 97  F (36.1  C) (Oral)   Resp 17   Wt 71.6 kg (157 lb 12.8 oz)   SpO2 95%     VSS on room air. Up independently. NJ in place, clamped. NPO for scheduled necrosectomy at 1445 today. Denies pain at rest. Voiding, not saving. No BM this shift per patient. RLE numbness, no change. Resting between cares. Continue with POC.

## 2020-07-15 NOTE — ANESTHESIA PREPROCEDURE EVALUATION
Anesthesia Pre-Procedure Evaluation    Patient: Norberto Almonte   MRN:     [id number] Gender:   male   Age:    64 year old :      1955        Preoperative Diagnosis: Necrotizing pancreatitis [K85.91]   Procedure(s):  ESOPHAGOGASTRODUODENOSCOPY, WITH ENDOSCOPIC ULTRASOUND, WITH ENDOSCOPIC EXCISION OF NECROTIC PANCREATIC TISSUE     LABS:  CBC:   Lab Results   Component Value Date    WBC 7.7 2020    WBC 8.4 2020    HGB 10.2 (L) 2020    HGB 10.2 (L) 2020    HCT 34.1 (L) 2020    HCT 34.2 (L) 2020     2020     2020     BMP:   Lab Results   Component Value Date     2020     2020    POTASSIUM 3.8 2020    POTASSIUM 3.8 2020    CHLORIDE 106 2020    CHLORIDE 105 2020    CO2 24 2020    CO2 27 2020    BUN 12 2020    BUN 11 2020    CR 0.60 (L) 2020    CR 0.63 (L) 2020    GLC 98 2020    GLC 98 2020     COAGS:   Lab Results   Component Value Date    INR 1.04 2020     POC:   Lab Results   Component Value Date     (H) 07/15/2020     OTHER:   Lab Results   Component Value Date    A1C 6.3 (H) 2020    BARAK 9.0 2020    PHOS 2.6 2020    MAG 2.1 2020    ALBUMIN 2.6 (L) 2020    PROTTOTAL 7.5 2020    ALT 22 2020    AST 16 2020    ALKPHOS 147 2020    BILITOTAL 0.7 2020    .0 (H) 2020        Preop Vitals    BP Readings from Last 3 Encounters:   20 125/59    Pulse Readings from Last 3 Encounters:   20 93      Resp Readings from Last 3 Encounters:   20 17    SpO2 Readings from Last 3 Encounters:   20 95%      Temp Readings from Last 1 Encounters:   20 36.1  C (97  F) (Oral)    Ht Readings from Last 1 Encounters:   No data found for Ht      Wt Readings from Last 1 Encounters:   07/10/20 71.6 kg (157 lb 12.8 oz)    There is no height or weight on file to calculate BMI.      LDA:  Peripheral IV 07/07/20 Left;Posterior Lower forearm (Active)   Site Assessment [facility] 07/15/20 0400   Line Status Saline locked 07/15/20 0400   Phlebitis Scale 0-->no symptoms 07/15/20 0400   Infiltration Scale 0 07/15/20 0400   Infiltration Site Treatment Method  None 07/14/20 0830   Extravasation? No 07/14/20 1717   Number of days: 8       NG/OG/NJ Tube Nasojejunal 12 fr Left nostril (Active)   Site Description [facility] 07/15/20 0400   Status Clamped 07/15/20 0400   [place] (cm marking) at nare/mouth 99 cm 07/08/20 1600   Intake (ml) 15 ml 07/09/20 0745   Flush/Free Water (mL) 60 mL 07/09/20 1600   Number of days: 11        History reviewed. No pertinent past medical history.   Past Surgical History:   Procedure Laterality Date     ENDOSCOPIC ULTRASOUND, ESOPHAGOSCOPY, GASTROSCOPY, DUODENOSCOPY (EGD), NECROSECTOMY N/A 7/4/2020    Procedure: Endoscopic ultrasound with cyst gastrostomy, dilation and stent placement, nasojejunal feeding tube placement;  Surgeon: César Almaguer MD;  Location: [facility] OR     INSERT TUBE NASOJEJUNOSTOMY  7/4/2020    Procedure: Insert tube nasojejunostomy;  Surgeon: César Almaguer MD;  Location: [facility] OR      No Known Allergies     Anesthesia Evaluation     . Pt has had prior anesthetic. Type: General           ROS/MED HX    ENT/Pulmonary:  - neg pulmonary ROS     Neurologic:  - neg neurologic ROS     Cardiovascular:     (+) hypertension----. : . . . :. .       METS/Exercise Tolerance:     Hematologic:  - neg hematologic  ROS       Musculoskeletal:  - neg musculoskeletal ROS       GI/Hepatic:     (+) Other GI/Hepatic necrotizing pancreatitis      Renal/Genitourinary:  - ROS Renal section negative       Endo:     (+) type II DM .      Psychiatric:  - neg psychiatric ROS       Infectious Disease:  - neg infectious disease ROS       Malignancy:      - no malignancy   Other:                         PHYSICAL EXAM:   Mental Status/Neuro: A/A/O   Airway: Facies: Feasible  Mallampati:  II  Mouth/Opening: Full  TM distance: > 6 cm  Neck ROM: Full   Respiratory: Auscultation: CTAB     Resp. Rate: Normal     Resp. Effort: Normal      CV: Rhythm: Regular  Rate: Age appropriate  Heart: Normal Sounds  Edema: None   Comments:      Dental: Normal Dentition                Assessment:   ASA SCORE: 3    H&P: History and physical reviewed and following examination; no interval change.   Smoking Status:  Non-Smoker/Unknown   NPO Status: NPO Appropriate     Plan:   Anes. Type:  General   Pre-Medication: None   Induction:  IV (Standard)   Airway: ETT; Oral   Access/Monitoring: PIV   Maintenance: Balanced     Postop Plan:   Postop Pain: Opioids  Postop Sedation/Airway: Not planned     PONV Management:   Adult Risk Factors:, Non-Smoker, Postop Opioids   Prevention: Ondansetron, Dexamethasone     CONSENT: Direct conversation   Plan and risks discussed with: Patient   Blood Products: Consent Deferred (Minimal Blood Loss)                   Ervin Villalta MD

## 2020-07-15 NOTE — DISCHARGE INSTRUCTIONS
Diabetes Plan-  Check glucose before meals and at bedtime when first home.  If glucose is stable, you can reduce to checking twice per day, before breakfast and before evening meal.    Lantus 28 units, once per day in the morning (if you are told not to eat for a procedure, take a smaller dose of Lantus that day, 20 units)    Novolog 10 units per meal    For snacks, try to eat low-carbohydrate foods.  If you choose higher carbohydrate foods for your snack, take 5 units Novolog    If you have questions regarding your diabetes discharge treatment plan within the first week following discharge, contact the inpatient diabetes management team or the on-call endocrinologist by calling the hospital  [phone].    Diabetes follow-up appointment has been requested for you.  Call [facility] End[facility]ology Clinic coordinator: [phone], if you haven't heard appointment confirmation within a week of discharge.    Low-carb snacks, ready to eat  1. Nuts  2. Cheese  3. Olives  4. Celery  5. Full-fat yogurt, plain or Greek  6. Cherry tomatoes  7. Berries  8. Cold meats  9. Jerky  10. Pepperoni sticks

## 2020-07-15 NOTE — ANESTHESIA CARE TRANSFER NOTE
Patient: Norberto Almonte    Procedure(s):  ESOPHAGOGASTRODUODENOSCOPY  WITH ENDOSCOPIC EXCISION OF NECROTIC PANCREATIC TISSUE, STENT EXCHANGE, NJ TUBE REMOVAL    Diagnosis: Necrotizing pancreatitis [K85.91]  Diagnosis Additional Information: No value filed.    Anesthesia Type:   General     Note:  Anesthesia Care Transfer Notewriter    Vitals: (Last set prior to Anesthesia Care Transfer)    CRNA VITALS  7/15/2020 1514 - 7/15/2020 1558      7/15/2020             NIBP:  125/76    Ht Rate:  88                Electronically Signed By: KIM Dash CRNA  July 15, 2020  3:58 PM

## 2020-07-16 ENCOUNTER — PATIENT OUTREACH (OUTPATIENT)
Dept: CARE COORDINATION | Facility: CLINIC | Age: 65
End: 2020-07-16

## 2020-07-16 VITALS
DIASTOLIC BLOOD PRESSURE: 67 MMHG | HEART RATE: 84 BPM | OXYGEN SATURATION: 96 % | SYSTOLIC BLOOD PRESSURE: 125 MMHG | WEIGHT: 157.8 LBS | RESPIRATION RATE: 16 BRPM | TEMPERATURE: 97.2 F

## 2020-07-16 LAB
ALBUMIN SERPL-MCNC: 2.6 G/DL (ref 3.4–5)
ALP SERPL-CCNC: 129 U/L (ref 40–150)
ALT SERPL W P-5'-P-CCNC: 18 U/L (ref 0–70)
ANION GAP SERPL CALCULATED.3IONS-SCNC: 5 MMOL/L (ref 3–14)
AST SERPL W P-5'-P-CCNC: 14 U/L (ref 0–45)
BILIRUB SERPL-MCNC: 0.5 MG/DL (ref 0.2–1.3)
BUN SERPL-MCNC: 12 MG/DL (ref 7–30)
CALCIUM SERPL-MCNC: 8.7 MG/DL (ref 8.5–10.1)
CHLORIDE SERPL-SCNC: 105 MMOL/L (ref 94–109)
CO2 SERPL-SCNC: 27 MMOL/L (ref 20–32)
CREAT SERPL-MCNC: 0.74 MG/DL (ref 0.66–1.25)
ERYTHROCYTE [DISTWIDTH] IN BLOOD BY AUTOMATED COUNT: 16.8 % (ref 10–15)
GFR SERPL CREATININE-BSD FRML MDRD: >90 ML/MIN/{1.73_M2}
GLUCOSE BLDC GLUCOMTR-MCNC: 152 MG/DL (ref 70–99)
GLUCOSE BLDC GLUCOMTR-MCNC: 158 MG/DL (ref 70–99)
GLUCOSE BLDC GLUCOMTR-MCNC: 228 MG/DL (ref 70–99)
GLUCOSE SERPL-MCNC: 172 MG/DL (ref 70–99)
HCT VFR BLD AUTO: 32.8 % (ref 40–53)
HGB BLD-MCNC: 10 G/DL (ref 13.3–17.7)
MCH RBC QN AUTO: 28.4 PG (ref 26.5–33)
MCHC RBC AUTO-ENTMCNC: 30.5 G/DL (ref 31.5–36.5)
MCV RBC AUTO: 93 FL (ref 78–100)
PLATELET # BLD AUTO: 270 10E9/L (ref 150–450)
POTASSIUM SERPL-SCNC: 4 MMOL/L (ref 3.4–5.3)
PROT SERPL-MCNC: 7.4 G/DL (ref 6.8–8.8)
RBC # BLD AUTO: 3.52 10E12/L (ref 4.4–5.9)
SODIUM SERPL-SCNC: 137 MMOL/L (ref 133–144)
WBC # BLD AUTO: 6.2 10E9/L (ref 4–11)

## 2020-07-16 PROCEDURE — 99207 ZZC CDG-CUT & PASTE-POTENTIAL IMPACT ON LEVEL: CPT | Performed by: PEDIATRICS

## 2020-07-16 PROCEDURE — 25000131 ZZH RX MED GY IP 250 OP 636 PS 637: Performed by: CLINICAL NURSE SPECIALIST

## 2020-07-16 PROCEDURE — 25000132 ZZH RX MED GY IP 250 OP 250 PS 637: Performed by: INTERNAL MEDICINE

## 2020-07-16 PROCEDURE — 36415 COLL VENOUS BLD VENIPUNCTURE: CPT | Performed by: STUDENT IN AN ORGANIZED HEALTH CARE EDUCATION/TRAINING PROGRAM

## 2020-07-16 PROCEDURE — 85027 COMPLETE CBC AUTOMATED: CPT | Performed by: STUDENT IN AN ORGANIZED HEALTH CARE EDUCATION/TRAINING PROGRAM

## 2020-07-16 PROCEDURE — 99238 HOSP IP/OBS DSCHRG MGMT 30/<: CPT | Mod: GC | Performed by: PEDIATRICS

## 2020-07-16 PROCEDURE — 80053 COMPREHEN METABOLIC PANEL: CPT | Performed by: STUDENT IN AN ORGANIZED HEALTH CARE EDUCATION/TRAINING PROGRAM

## 2020-07-16 PROCEDURE — 00000146 ZZHCL STATISTIC GLUCOSE BY METER IP

## 2020-07-16 RX ADMIN — OMEPRAZOLE 20 MG: 20 CAPSULE, DELAYED RELEASE ORAL at 06:58

## 2020-07-16 RX ADMIN — MULTIVITAMIN 15 ML: LIQUID ORAL at 09:13

## 2020-07-16 RX ADMIN — PANCRELIPASE 4 CAPSULE: 24000; 76000; 120000 CAPSULE, DELAYED RELEASE PELLETS ORAL at 12:38

## 2020-07-16 RX ADMIN — PANCRELIPASE 96000 UNITS: 24000; 76000; 120000 CAPSULE, DELAYED RELEASE PELLETS ORAL at 07:01

## 2020-07-16 RX ADMIN — INSULIN GLARGINE 28 UNITS: 100 INJECTION, SOLUTION SUBCUTANEOUS at 11:21

## 2020-07-16 ASSESSMENT — ACTIVITIES OF DAILY LIVING (ADL)
ADLS_ACUITY_SCORE: 11

## 2020-07-16 NOTE — PLAN OF CARE
DISCHARGE                         7/16/2020  3:34 PM  ----------------------------------------------------------------------------  Discharged to: Home  Via: private transportation  Accompanied by: Friend  Prescriptions: To be filled by pt own pharmacy  Follow Up Appointments: arranged; information given  Belongings: All sent with pt

## 2020-07-16 NOTE — DISCHARGE SUMMARY
[facility], [place]  Discharge Summary - Medicine & Pediatrics       Date of Admission:  7/2/2020  Date of Discharge:  7/16/2020  3:34 PM  Discharging Provider: Mainor Calderon  Discharge Service: Mainor Calderon    Discharge Diagnoses   Idiopathic Necrotizing Pancreatitis with Walled Off Necrosis  Gastric Outlet Obstruction  SMV and Splenic Vein Thrombosis  Pancreatogenic Diabetes    Follow-ups Needed After Discharge   Follow-up Appointments     Adult [facility] Follow-up and recommended labs and tests      Follow up with primary care provider, Nickolas Yin, within 7 days   for hospital follow- up.  No follow up labs or test are needed.      Appointments on [place] and/or [facility] (with [facility] or [facility]   provider or service). Call [phone] if you haven't heard regarding   these appointments within 7 days of discharge.            Additional follow-up with GI with CT A/P in 2-4 weeks and repeat necrosectomy after imaging. (GI to arrange follow-up)    Discharge Disposition   Discharged to home  Condition at discharge: Stable    Hospital Course   Norberto Almonte was admitted on 7/2/2020 for recent CT findings of a complex cystic structure and air within a worsening WON concerning for infection. He is presenting as a transfer from  for surgical evaluation by GI for the pseudocyst.The following problems were addressed during his hospitalization:    # Idiopathic necrotizing pancreatitis c/b large WON with GOO  Patient has a complex history of necrotizing pancreatitis complicated by splenic vein and superior mesenteric vein thrombosis, HARRIS, septic shock, acute hypoxic respiratory failure, pancreatitis induced diabetes, toxic encephalopathy and pseudocyst. OSH CT imaging (6/28) shows progressive change with a large complex cystic structure in the mid abdomen and a worsening pancreatic pseudocyst with evidence of air concerning for infection. Completed a course of Zosyn for  suspected infection of the pseudocyst. GI evaluated the patient and suspects that the gas in the collection is due to fistula and that the pseudocyst is causing gastric outlet obstruction. EUS-guided cystogastrostomy with NJ tube placement (7/4). Tube feeds were initiated and subsequently discontinued as the patient was able to PO sufficiently. Repeat imaging 7/9 showed decreased size of the necrotic collection and unchanged biliary dilatation. Necrosectomy with GI on 7/15 with large amount of necrotic tissue removed but necrotic tissue still present thus will need repeat necrosectomy in 2-4 weeks.   - GI follow-up in 2-4 weeks with CT A/P and endoscopic necrosectomy with GI    -GI to arrange follow-up     # SMV & Splenic vein thrombosis  2/2 The patient was initiated on warfarin and most recently on therapeutic dosing of lovenox. On the most recent CTA (6/28), there was no visualization of the thrombosis. No thrombosis was seen on repeat CT (7/9). Discussed with GI prior to discharge and since no current thrombosis recommending discontinuation of anticoagulation  -Discontinue warfrain     # Pancreatogenic diabetes  Hgb 6.3 on admission. He was started on his pta dose of insulin glargine 14U BD, but continued to have elevated blood sugars so the dose was increased to 16U BD on 7/07 AM. He was then switched to insulin gtt with mealtime carb correction of 1:20 as sugars were still not adequately controlled. Endocrine was consulted.   - Daily glargine 28U   - Mealtime novolog 10U TID with meals (Rx sent to local [facility] per patient request)  - Will follow up with [facility] endocrine clinic once discharged, request has already been placed    #Non-Severe / Mild or Moderate (protein-calorie) Malnutrition   Poor PO intake for >7days and reduced muscle mass thus meets criteria for mild malnutrition. Nutrition consulted during admission. Recently started on creon PTA  - Nutrition consulted     #Pressure Injury, stage  3 vs 4    Left posterior heel that pas present on admission; appeared to be resolving. [facility] nurse consulted and followed during admission.      Consultations This Hospital Stay   WOUND OSTOMY CONTINENCE NURSE  IP CONSULT  GI PANCREATICOBILIARY ADULT IP CONSULT  PHYSICAL THERAPY ADULT IP CONSULT  OCCUPATIONAL THERAPY ADULT IP CONSULT  VASCULAR ACCESS CARE ADULT IP CONSULT  NUTRITION SERVICES ADULT IP CONSULT  MEDICATION HISTORY IP PHARMACY CONSULT  NUTRITION SERVICES ADULT IP CONSULT  PHARMACY IP CONSULT  VASCULAR ACCESS CARE ADULT IP CONSULT  PHARMACY IP CONSULT  VASCULAR ACCESS CARE ADULT IP CONSULT  VASCULAR ACCESS CARE ADULT IP CONSULT  ENDOCRINE DIABETES ADULT IP CONSULT  DIABETES EDUCATION IP CONSULT  ADVANCE DIRECTIVE IP CONSULT    Code Status   Full Code       The patient was discussed with Dr. Bianca Giron MD  [place] [address] Service  [facility]  Pager: [phone]  ______________________________________________________________________    Physical Exam   Vital Signs: Temp: 97.2  F (36.2  C) Temp src: Oral BP: 125/67 Pulse: 84 Heart Rate: 92 Resp: 16 SpO2: 96 % O2 Device: None (Room air)    Weight: 157 lbs 12.8 oz  GEN: resting comfortably in bed, no acute distress   HEENT: nc/at, EOMI  CV: RRR  PULM: ctab, bilateral chest expansion, no increased work of breathing  ABD: soft, mildly tender to palpation in epigastric region, moderately distended, +bs, no palpable organomegaly  NEURO: alert and oriented x3, no focal deficits      Primary Care Physician   Nickolas Yin    Discharge Orders      Home infusion referral      MD face to face encounter    No home care needed at time of discharge.     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 On 7/15 you had necrosectomy with the GI team which was completed without complications. You will need to follow-up with GI as an outpatient. GI team will call  you to set up appointment. If you experience fevers, chills, nausea, increased abdominal pain, blood in stool please seek medical attention.     Your insulin was increased to Lantus 28U daily and Novolog 10U with every meal. Your insulin was sent to your local pharmacy.     It was a pleasure taking care of you in the hospital.     Adult [facility] Follow-up and recommended labs and tests    Follow up with primary care provider, Nickolas Yin, within 7 days for hospital follow- up.  No follow up labs or test are needed.      Appointments on [place] and/or [facility] (with [facility] or [facility] provider or service). Call [phone] if you haven't heard regarding these appointments within 7 days of discharge.     Activity    Your activity upon discharge: activity as tolerated     Diet    Follow this diet upon discharge: Orders Placed This Encounter      Calorie Counts      Regular Diet Adult       Significant Results and Procedures   Most Recent 3 CBC's:  Recent Labs   Lab Test 07/16/20  0707 07/15/20  0721 07/14/20  0716   WBC 6.2 7.1 7.7   HGB 10.0* 9.7* 10.2*   MCV 93 92 92    292 328     Most Recent 3 BMP's:  Recent Labs   Lab Test 07/16/20  0707 07/15/20  0721 07/14/20  0716    138 138   POTASSIUM 4.0 3.8 3.8   CHLORIDE 105 106 106   CO2 27 25 24   BUN 12 11 12   CR 0.74 0.65* 0.60*   ANIONGAP 5 7 8   BARAK 8.7 8.9 9.0   * 109* 98     Most Recent 2 LFT's:  Recent Labs   Lab Test 07/16/20  0707 07/14/20  0716   AST 14 16   ALT 18 22   ALKPHOS 129 147   BILITOTAL 0.5 0.7       Discharge Medications   Discharge Medication List as of 7/16/2020 12:49 PM      START taking these medications    Details   insulin aspart (NOVOLOG PEN) 100 UNIT/ML pen Inject 10 Units Subcutaneous 3 times daily (with meals), Disp-9 mL,R-0, E-Prescribe         CONTINUE these medications which have CHANGED    Details   insulin glargine (LANTUS PEN) 100 UNIT/ML pen Inject 28 Units Subcutaneous every morning, Disp-9 mL,R-0,  E-PrescribeIf Lantus is not covered by insurance, may substitute Basaglar at same dose and frequency.           CONTINUE these medications which have NOT CHANGED    Details   amylase-lipase-protease (CREON 24) 02132-62909 units CPEP per EC capsule Take 1 capsule by mouth 3 times daily (with meals), Historical      aspirin (ASA) 81 MG chewable tablet Take 81 mg by mouth daily, Historical      fish oil-omega-3 fatty acids 1000 MG capsule Take 2 g by mouth daily, Historical      lisinopril (ZESTRIL) 10 MG tablet Take 10 mg by mouth daily, Historical      multivitamin w/minerals (THERA-VIT-M) tablet Take 1 tablet by mouth daily, Historical      omeprazole 20 MG tablet Take 20 mg by mouth 2 times daily, Historical      ondansetron (ZOFRAN-ODT) 4 MG ODT tab Take 4 mg by mouth every 8 hours as needed for nausea, Historical         STOP taking these medications       enoxaparin ANTICOAGULANT (LOVENOX) 80 MG/0.8ML syringe Comments:   Reason for Stopping:         warfarin ANTICOAGULANT (COUMADIN) 5 MG tablet Comments:   Reason for Stopping:             Allergies   No Known Allergies

## 2020-07-16 NOTE — PLAN OF CARE
/67 (BP Location: Left arm)   Pulse 84   Temp 97.2  F (36.2  C) (Oral)   Resp 16   Wt 71.6 kg (157 lb 12.8 oz)   SpO2 96%     Neuro: A&Ox4.   Cardiac: AVSS.   Respiratory: Sats 96% on RA.  GI/: Adequate urine output. BM X1  Diet/appetite: Tolerating diet. Eating well.  Activity:  Independent in room and halls  Pain: Denies  Skin: No new deficits noted.  LDA's: No IV access     Reviewed discharge instructions. Verbalized understanding. PIV removed. Plan to discharge home today. Pt waiting for his ride to pick him up.

## 2020-07-16 NOTE — PROGRESS NOTES
Gastroenterology Inpatient Sign Off Note    Inpatient GI consults service will sign off. No further recommendations at this time. If primary team has addition questions, please page consult fellow listed in [place].    Current GI Consult Staff: Dr Stevens     Follow up recommendations:   - Repeat imaging needed: CT A/P in 2-4 weeks (prior to procedure)  - Repeat procedure needed:  Necrosectomy in 2-4 weeks    GI will arrange above.    -Staff responsible for outpatient care: Dr Tripp SYED MD  Gastroenterology Fellow  Division of Gastroenterology, Hepatology and Nutrition  [facility]

## 2020-07-16 NOTE — PLAN OF CARE
Assumed care of patient from [id number].   POD#1 of A&OX4 Necrosectomy, NJ removed. Refused CAPNO. VSS on room air. Denies pain throughout shift. On regular diet, denies nausea. Passing gas, no BM overnight. PIV's saline locked  Intermittent right foot numbness. Left posterior heel with wound resolving. Ambulates independently in room and to the bathroom with a cane. Voiding but no saving. Blood sugar check before meal and at bedtime, insulin given per sliding scale. Calls appropriately. Comfortably resting between care. Continue with plan of care.

## 2020-07-16 NOTE — PROGRESS NOTES
Diabetes Consult Daily  Progress Note          Assessment/Plan:   64-year-old man with newly diagnosed diabetes (since April 2020) in the setting of acute pancreatitis (April 2020) complicated by necrotizing pancreatitis.  Admitted on 7/2/2020 for surgical evaluation by GI for cirrhosis.  Admission hemoglobin A1c of 6.2.  Tube feeds on 7/ 4 and reached goal at 7/6/2020--> regular diet only since 7/11.    Variable BG control in setting of NPO and steroids    PLAN  1.  Diabetes with hx of pancreatitis/necrotizing pancreatitis    *glargine 28 units every morning, starting now. Plan for once daily dosing at home  *prandial aspart per carb in hospital, but fixed meal dosing for home  *aspart medium resistance correction qAC, HS (this will not be included on discharge)  *ordered diabetes ed to teach pt Novolog-- he is understanding this reasonably well so far  *RD provided nutrition educ materials  *sample diabetes schedule and low carb snack list given to pt to review (also pasted into AVS)  *BG qAC, HS 0200  *when pt is NPO- pt should continue with the glargine (reduced dose advised for outpt)      Outpatient follow up for diabetes- prefers to do  [facility] for now (appt request sent 7/15), then seek care near home in [place], [place]       Discussed w/ pt, RN,  primary team and page to diab educ    UPdated plan pasted into AVS and given to pt for review  Diabetes Plan-  Check glucose before meals and at bedtime when first home.  If glucose is stable, you can reduce to checking twice per day, before breakfast and before evening meal.  Lantus 28 units, once per day in the morning (if you are told not to eat for a procedure, take a smaller dose of Lantus that day, 20 units)  Novolog 10 units per meal    For snacks, try to eat low-carbohydrate foods.  If you choose higher carbohydrate foods for your snack, take 5 units Novolog    If you have questions regarding your diabetes discharge treatment plan within the  first week following discharge, contact the inpatient diabetes management team or the on-call endocrinologist by calling the hospital  [phone].    Diabetes follow-up appointment has been requested for you.  Call [facility] Endocrinology Clinic coordinator: [phone], if you haven't heard appointment confirmation within a week of discharge.    Low-carb snacks, ready to eat  1. Nuts  2. Cheese  3. Olives  4. Celery  5. Full-fat yogurt, plain or Greek  6. Cherry tomatoes  7. Berries  8. Cold meats  9. Jerky  10. Pepperoni sticks               Interval History:   Yesterday:  Endoscopic necrosectomy successful in removing a large                        amount of solid necrosis, however a large amount remained  Feels good today.  FT removed.  Ready to go home.  Glargine 20 units yesterday noon, Dex 4 mg at 1430, BG 76 before supper, then to 200s afterwards  Mateus remembered the glargine would be switched to daily.  REcalls he should take aspart with each meal.  Asking good questions about what to do if he eats while away from home.  Reaffirms plan for [facility] follow up for diabetes.  He read through the treatment plan and voiced understanding.  He wants to make sure his Novolog prescription goes to his home pharmacy.    Plans to get on his bike by August 1.         PTA diabetes regimen:   Checking BG twice daily: glucose  with occasional 200s  Lantus 14 units in AM around 0800 and in evening sometime between 6739-4338  No Novolog at home  Regularly active    Recent Labs   Lab 07/16/20  0723 07/16/20  0707 07/16/20  0219 07/15/20  2209 07/15/20  1959 07/15/20  1739 07/15/20  1549  07/15/20  0721  07/14/20  0716  07/13/20  0720  07/11/20  0717  07/10/20  0710   GLC  --  172*  --   --   --   --   --   --  109*  --  98  --  98  --  104*  --  173*   *  --  228* 232* 231* 76 90   < >  --    < >  --    < >  --    < > 104*   < >  --     < > = values in this interval not displayed.               Review of  Systems:   See interval hx          Medications:     Orders Placed This Encounter      Regular Diet Adult               Physical Exam     /75 (BP Location: Left arm)   Pulse 100   Temp 97.2  F (36.2  C) (Oral)   Resp 16   Wt 71.6 kg (157 lb 12.8 oz)   SpO2 96%     General:  pleasant thin man up in room, NAD  HEENT: NC/AT, PER and anicteric, non-injected, oral mucous membranes moist.  Lungs: unlabored respiration, no cough  Skin: warm and dry, no obvious lesions  MSK:  fluid movement of all extremities  Lymp:  no LE edema   Mental status:  alert, oriented x3, communicating clearly  Psych:  Slightly anxious about timing of discharge, ride    /75 (BP Location: Left arm)   Pulse 100   Temp 97.2  F (36.2  C) (Oral)   Resp 16   Wt 71.6 kg (157 lb 12.8 oz)   SpO2 96%            Data:     Lab Results   Component Value Date    A1C 6.3 07/02/2020            Recent Labs   Lab Test 07/16/20  0707 07/15/20  0721    138   POTASSIUM 4.0 3.8   CHLORIDE 105 106   CO2 27 25   ANIONGAP 5 7   * 109*   BUN 12 11   CR 0.74 0.65*   BARAK 8.7 8.9     CBC RESULTS:   Recent Labs   Lab Test 07/16/20  0707   WBC 6.2   RBC 3.52*   HGB 10.0*   HCT 32.8*   MCV 93   MCH 28.4   MCHC 30.5*   RDW 16.8*      I spent a total of 35 minutes bedside and on the inpatient unit managing the glycemic care of Norberto Almonte. Over 50% of my time on the unit was spent counseling the patient  and/or coordinating care regarding diabets mgmnt plan for home.  See note for details.    Jyothi Schultz APRN [id number]  Diabetes Management Team job code: [email or web address]

## 2020-07-17 ENCOUNTER — PATIENT OUTREACH (OUTPATIENT)
Dept: GASTROENTEROLOGY | Facility: CLINIC | Age: 65
End: 2020-07-17

## 2020-07-17 ENCOUNTER — PREP FOR PROCEDURE (OUTPATIENT)
Dept: GASTROENTEROLOGY | Facility: CLINIC | Age: 65
End: 2020-07-17

## 2020-07-17 DIAGNOSIS — K85.91 NECROTIZING PANCREATITIS: Primary | ICD-10-CM

## 2020-07-17 LAB — UPPER GI ENDOSCOPY: NORMAL

## 2020-07-17 NOTE — PROGRESS NOTES
[facility] Internal Procedure form:    Referring/Requesting provider: inpatient consult follow up    Procedure Requested: EGD with necrostectomy - scheduled for 8/13/20    Requested provider (if specified): Tripp    Specific method of sedation requested: General    History and physical within last 30 days? yes    Indication/Reason for procedure: necrotizing pancreatitis    Requested urgency of procedure: tier 2    Is patient is aware of request for procedure and ok to be contacted to schedule? Yes    Patient needs CT abdomen day prior to procedure.  Order placed.  Will need to be done on 8/10/20 and he can have Covid test done same day.      OR orders placed and patient is scheduled for procedure 8/13/20.  Reviewed     Preop Instructions provided including NPO solids 8 hours prior, clear liquids up to 2 hours prior and NPO 2 hours,  home, no blood thinners currently.    Asked to call with any additional questions or concerns.    Libra Garcia RN   BSN, HNBC, STAR-T  [facility]  Care Coordinator  Ph: [phone]  FAX: [phone]

## 2020-07-21 DIAGNOSIS — Z11.59 ENCOUNTER FOR SCREENING FOR OTHER VIRAL DISEASES: Primary | ICD-10-CM

## 2020-07-22 NOTE — TELEPHONE ENCOUNTER
Ct has been rescheduled.  He does not know about the Surgery date/time.  He does not have a ride/or hotel.

## 2020-07-29 ENCOUNTER — TRANSFERRED RECORDS (OUTPATIENT)
Dept: HEALTH INFORMATION MANAGEMENT | Facility: CLINIC | Age: 65
End: 2020-07-29

## 2020-07-30 ENCOUNTER — MEDICAL CORRESPONDENCE (OUTPATIENT)
Dept: HEALTH INFORMATION MANAGEMENT | Facility: CLINIC | Age: 65
End: 2020-07-30

## 2020-07-30 ENCOUNTER — PATIENT OUTREACH (OUTPATIENT)
Dept: GASTROENTEROLOGY | Facility: CLINIC | Age: 65
End: 2020-07-30

## 2020-07-30 NOTE — PROGRESS NOTES
"Care Coordination Telephone Call  [facility]     Called patient to discuss how he is feeling.     Main c/o bloating, denies nausea or vomiting.  Denies fever or chills.  Relates \"discomfort in my abdomen that is basically the same\"   Denies chills.     Relates that he is taking fluids and eating without difficulty.      Relates that in the past week bloating has increased.  Having regular formed stools.    I have asked the patient to call with any additional questions or concerns and have provided my contact information.    Plan:  Dr. Almaguer will talk with PCP; plan to move OR case to 8/6/20 from 8/13/20  Patient has been contacted and has confirmed the date change.  He will contact his PCP to arrange Covid test.  He will also pick the CD with recent CT images.     Libra GANN, HNBC, STAR-T  RN Care Coordinator  [facility]  Ph: [phone]  FAX: [phone]          "

## 2020-07-31 ENCOUNTER — TELEPHONE (OUTPATIENT)
Dept: ONCOLOGY | Facility: CLINIC | Age: 65
End: 2020-07-31

## 2020-07-31 ENCOUNTER — PATIENT OUTREACH (OUTPATIENT)
Dept: GASTROENTEROLOGY | Facility: CLINIC | Age: 65
End: 2020-07-31

## 2020-07-31 NOTE — TELEPHONE ENCOUNTER
[phone]     Successful transmission verified by RightFax.     Sangeetha Choudhury CMA        ----- Message from Libra Garcia RN sent at 7/31/2020 12:52 PM CDT -----  Regarding[facility]  Please fax covid order to [phone].  Thanks!  MK

## 2020-08-05 ENCOUNTER — TELEPHONE (OUTPATIENT)
Dept: ENDOCRINOLOGY | Facility: CLINIC | Age: 65
End: 2020-08-05

## 2020-08-05 ENCOUNTER — ANESTHESIA EVENT (OUTPATIENT)
Dept: SURGERY | Facility: CLINIC | Age: 65
End: 2020-08-05
Payer: OTHER GOVERNMENT

## 2020-08-05 NOTE — TELEPHONE ENCOUNTER
CLINIC COORDINATOR SCHEDULING NOTES    CALL RESULT: LVM    APPT TYPE: VIRTUAL VISIT RETURN    PROVIDER: Danny / ben BUSH    DATE APPT NEEDED: ASAP    ADDITIONAL NOTES: Hospital f/u

## 2020-08-05 NOTE — TELEPHONE ENCOUNTER
Pt returned phone call and stated he was told that this appointment was supposed to be scheduled 2 weeks after surgery. The note left stated asap. Please follow up with pt

## 2020-08-05 NOTE — TELEPHONE ENCOUNTER
----- Message from KIM Sepulveda sent at 7/15/2020 11:22 AM CDT -----  Regarding: hospital follow up diabetes appt  Dear coordinators,  Mateus would like to follow with our outpatient diabetes team.  An appointment within three weeks would be great for him.    Expected to discharge in next couple days.  Can be reached on his mobile phone or room phone: [phone]    Thanks!

## 2020-08-06 ENCOUNTER — ANESTHESIA (OUTPATIENT)
Dept: SURGERY | Facility: CLINIC | Age: 65
End: 2020-08-06
Payer: OTHER GOVERNMENT

## 2020-08-06 ENCOUNTER — HOSPITAL ENCOUNTER (OUTPATIENT)
Facility: CLINIC | Age: 65
Discharge: HOME | End: 2020-08-06
Attending: INTERNAL MEDICINE | Admitting: INTERNAL MEDICINE
Payer: OTHER GOVERNMENT

## 2020-08-06 ENCOUNTER — APPOINTMENT (OUTPATIENT)
Dept: GENERAL RADIOLOGY | Facility: CLINIC | Age: 65
End: 2020-08-06
Attending: INTERNAL MEDICINE
Payer: OTHER GOVERNMENT

## 2020-08-06 ENCOUNTER — EXTERNAL ORDER RESULTS (OUTPATIENT)
Dept: NURSING | Facility: CLINIC | Age: 65
End: 2020-08-06

## 2020-08-06 VITALS
WEIGHT: 160.5 LBS | HEIGHT: 69 IN | TEMPERATURE: 98.4 F | BODY MASS INDEX: 23.77 KG/M2 | SYSTOLIC BLOOD PRESSURE: 107 MMHG | HEART RATE: 95 BPM | RESPIRATION RATE: 14 BRPM | OXYGEN SATURATION: 100 % | DIASTOLIC BLOOD PRESSURE: 60 MMHG

## 2020-08-06 DIAGNOSIS — Z11.59 ENCOUNTER FOR SCREENING FOR OTHER VIRAL DISEASES: ICD-10-CM

## 2020-08-06 DIAGNOSIS — K85.91 NECROTIZING PANCREATITIS: ICD-10-CM

## 2020-08-06 LAB
ALBUMIN SERPL-MCNC: 2.2 G/DL (ref 3.4–5)
ALP SERPL-CCNC: 72 U/L (ref 40–150)
ALT SERPL W P-5'-P-CCNC: 30 U/L (ref 0–70)
AMYLASE SERPL-CCNC: 20 U/L (ref 30–110)
ANION GAP SERPL CALCULATED.3IONS-SCNC: 4 MMOL/L (ref 3–14)
AST SERPL W P-5'-P-CCNC: 22 U/L (ref 0–45)
BILIRUB SERPL-MCNC: 0.4 MG/DL (ref 0.2–1.3)
BUN SERPL-MCNC: 10 MG/DL (ref 7–30)
CALCIUM SERPL-MCNC: 8.7 MG/DL (ref 8.5–10.1)
CHLORIDE SERPL-SCNC: 102 MMOL/L (ref 94–109)
CO2 SERPL-SCNC: 28 MMOL/L (ref 20–32)
COVID-19 VIRUS BY PCR (EXTERNAL RESULT): NOT DETECTED
CREAT SERPL-MCNC: 0.71 MG/DL (ref 0.66–1.25)
ERYTHROCYTE [DISTWIDTH] IN BLOOD BY AUTOMATED COUNT: 16.7 % (ref 10–15)
GFR SERPL CREATININE-BSD FRML MDRD: >90 ML/MIN/{1.73_M2}
GLUCOSE BLDC GLUCOMTR-MCNC: 126 MG/DL (ref 70–99)
GLUCOSE BLDC GLUCOMTR-MCNC: 73 MG/DL (ref 70–99)
GLUCOSE BLDC GLUCOMTR-MCNC: 79 MG/DL (ref 70–99)
GLUCOSE BLDC GLUCOMTR-MCNC: 90 MG/DL (ref 70–99)
GLUCOSE SERPL-MCNC: 122 MG/DL (ref 70–99)
HCT VFR BLD AUTO: 31.1 % (ref 40–53)
HGB BLD-MCNC: 9.3 G/DL (ref 13.3–17.7)
INR PPP: 1.29 (ref 0.86–1.14)
LIPASE SERPL-CCNC: 73 U/L (ref 73–393)
MCH RBC QN AUTO: 26.9 PG (ref 26.5–33)
MCHC RBC AUTO-ENTMCNC: 29.9 G/DL (ref 31.5–36.5)
MCV RBC AUTO: 90 FL (ref 78–100)
PLATELET # BLD AUTO: 355 10E9/L (ref 150–450)
POTASSIUM SERPL-SCNC: 4.2 MMOL/L (ref 3.4–5.3)
PROT SERPL-MCNC: 7.7 G/DL (ref 6.8–8.8)
RBC # BLD AUTO: 3.46 10E12/L (ref 4.4–5.9)
SODIUM SERPL-SCNC: 134 MMOL/L (ref 133–144)
UPPER GI ENDOSCOPY: NORMAL
WBC # BLD AUTO: 9.7 10E9/L (ref 4–11)

## 2020-08-06 PROCEDURE — 36000059 ZZH SURGERY LEVEL 3 EA 15 ADDTL MIN UMMC: Performed by: INTERNAL MEDICINE

## 2020-08-06 PROCEDURE — 40000170 ZZH STATISTIC PRE-PROCEDURE ASSESSMENT II: Performed by: INTERNAL MEDICINE

## 2020-08-06 PROCEDURE — 36000061 ZZH SURGERY LEVEL 3 W FLUORO 1ST 30 MIN - UMMC: Performed by: INTERNAL MEDICINE

## 2020-08-06 PROCEDURE — 82150 ASSAY OF AMYLASE: CPT | Performed by: INTERNAL MEDICINE

## 2020-08-06 PROCEDURE — C1769 GUIDE WIRE: HCPCS | Performed by: INTERNAL MEDICINE

## 2020-08-06 PROCEDURE — 37000008 ZZH ANESTHESIA TECHNICAL FEE, 1ST 30 MIN: Performed by: INTERNAL MEDICINE

## 2020-08-06 PROCEDURE — 25500064 ZZH RX 255 OP 636: Performed by: INTERNAL MEDICINE

## 2020-08-06 PROCEDURE — C1876 STENT, NON-COA/NON-COV W/DEL: HCPCS | Performed by: INTERNAL MEDICINE

## 2020-08-06 PROCEDURE — 37000009 ZZH ANESTHESIA TECHNICAL FEE, EACH ADDTL 15 MIN: Performed by: INTERNAL MEDICINE

## 2020-08-06 PROCEDURE — 27210794 ZZH OR GENERAL SUPPLY STERILE: Performed by: INTERNAL MEDICINE

## 2020-08-06 PROCEDURE — 25000125 ZZHC RX 250: Performed by: NURSE ANESTHETIST, CERTIFIED REGISTERED

## 2020-08-06 PROCEDURE — 25000566 ZZH SEVOFLURANE, EA 15 MIN: Performed by: INTERNAL MEDICINE

## 2020-08-06 PROCEDURE — 36415 COLL VENOUS BLD VENIPUNCTURE: CPT | Performed by: INTERNAL MEDICINE

## 2020-08-06 PROCEDURE — 83690 ASSAY OF LIPASE: CPT | Performed by: INTERNAL MEDICINE

## 2020-08-06 PROCEDURE — 85610 PROTHROMBIN TIME: CPT | Performed by: INTERNAL MEDICINE

## 2020-08-06 PROCEDURE — 25000125 ZZHC RX 250: Performed by: INTERNAL MEDICINE

## 2020-08-06 PROCEDURE — 25000128 H RX IP 250 OP 636: Performed by: NURSE ANESTHETIST, CERTIFIED REGISTERED

## 2020-08-06 PROCEDURE — C1726 CATH, BAL DIL, NON-VASCULAR: HCPCS | Performed by: INTERNAL MEDICINE

## 2020-08-06 PROCEDURE — 82962 GLUCOSE BLOOD TEST: CPT

## 2020-08-06 PROCEDURE — 80053 COMPREHEN METABOLIC PANEL: CPT | Performed by: INTERNAL MEDICINE

## 2020-08-06 PROCEDURE — 40000277 XR SURGERY CARM FLUORO LESS THAN 5 MIN W STILLS: Mod: TC

## 2020-08-06 PROCEDURE — 71000027 ZZH RECOVERY PHASE 2 EACH 15 MINS: Performed by: INTERNAL MEDICINE

## 2020-08-06 PROCEDURE — 85027 COMPLETE CBC AUTOMATED: CPT | Performed by: INTERNAL MEDICINE

## 2020-08-06 PROCEDURE — 71000014 ZZH RECOVERY PHASE 1 LEVEL 2 FIRST HR: Performed by: INTERNAL MEDICINE

## 2020-08-06 PROCEDURE — 25800030 ZZH RX IP 258 OP 636: Performed by: NURSE ANESTHETIST, CERTIFIED REGISTERED

## 2020-08-06 DEVICE — STENT SOLUS BILIARY 10FRX05CM DBL PIGTAIL W/INTRO G25672
Type: IMPLANTABLE DEVICE | Site: STOMACH | Status: NON-FUNCTIONAL
Removed: 2020-08-24

## 2020-08-06 DEVICE — STENT SOLUS BILIARY 10FRX03CM DBL PIGTAIL W/INTRO G25670: Type: IMPLANTABLE DEVICE | Site: STOMACH | Status: FUNCTIONAL

## 2020-08-06 RX ORDER — NALOXONE HYDROCHLORIDE 0.4 MG/ML
.1-.4 INJECTION, SOLUTION INTRAMUSCULAR; INTRAVENOUS; SUBCUTANEOUS
Status: DISCONTINUED | OUTPATIENT
Start: 2020-08-06 | End: 2020-08-06 | Stop reason: HOSPADM

## 2020-08-06 RX ORDER — ONDANSETRON 2 MG/ML
4 INJECTION INTRAMUSCULAR; INTRAVENOUS EVERY 6 HOURS PRN
Status: DISCONTINUED | OUTPATIENT
Start: 2020-08-06 | End: 2020-08-06 | Stop reason: HOSPADM

## 2020-08-06 RX ORDER — SODIUM CHLORIDE, SODIUM LACTATE, POTASSIUM CHLORIDE, CALCIUM CHLORIDE 600; 310; 30; 20 MG/100ML; MG/100ML; MG/100ML; MG/100ML
INJECTION, SOLUTION INTRAVENOUS CONTINUOUS
Status: DISCONTINUED | OUTPATIENT
Start: 2020-08-06 | End: 2020-08-06 | Stop reason: HOSPADM

## 2020-08-06 RX ORDER — ONDANSETRON 4 MG/1
4 TABLET, ORALLY DISINTEGRATING ORAL EVERY 6 HOURS PRN
Status: DISCONTINUED | OUTPATIENT
Start: 2020-08-06 | End: 2020-08-06 | Stop reason: HOSPADM

## 2020-08-06 RX ORDER — LABETALOL 20 MG/4 ML (5 MG/ML) INTRAVENOUS SYRINGE
10
Status: DISCONTINUED | OUTPATIENT
Start: 2020-08-06 | End: 2020-08-06 | Stop reason: HOSPADM

## 2020-08-06 RX ORDER — FLUMAZENIL 0.1 MG/ML
0.2 INJECTION, SOLUTION INTRAVENOUS
Status: DISCONTINUED | OUTPATIENT
Start: 2020-08-06 | End: 2020-08-06 | Stop reason: HOSPADM

## 2020-08-06 RX ORDER — IOPAMIDOL 510 MG/ML
INJECTION, SOLUTION INTRAVASCULAR PRN
Status: DISCONTINUED | OUTPATIENT
Start: 2020-08-06 | End: 2020-08-06 | Stop reason: HOSPADM

## 2020-08-06 RX ORDER — LEVOFLOXACIN 5 MG/ML
INJECTION, SOLUTION INTRAVENOUS PRN
Status: DISCONTINUED | OUTPATIENT
Start: 2020-08-06 | End: 2020-08-06

## 2020-08-06 RX ORDER — LIDOCAINE 40 MG/G
CREAM TOPICAL
Status: DISCONTINUED | OUTPATIENT
Start: 2020-08-06 | End: 2020-08-06 | Stop reason: HOSPADM

## 2020-08-06 RX ORDER — SODIUM CHLORIDE, SODIUM LACTATE, POTASSIUM CHLORIDE, CALCIUM CHLORIDE 600; 310; 30; 20 MG/100ML; MG/100ML; MG/100ML; MG/100ML
INJECTION, SOLUTION INTRAVENOUS CONTINUOUS PRN
Status: DISCONTINUED | OUTPATIENT
Start: 2020-08-06 | End: 2020-08-06

## 2020-08-06 RX ORDER — PROPOFOL 10 MG/ML
INJECTION, EMULSION INTRAVENOUS PRN
Status: DISCONTINUED | OUTPATIENT
Start: 2020-08-06 | End: 2020-08-06

## 2020-08-06 RX ORDER — ONDANSETRON 2 MG/ML
4 INJECTION INTRAMUSCULAR; INTRAVENOUS EVERY 30 MIN PRN
Status: DISCONTINUED | OUTPATIENT
Start: 2020-08-06 | End: 2020-08-06 | Stop reason: HOSPADM

## 2020-08-06 RX ORDER — FENTANYL CITRATE 50 UG/ML
25-50 INJECTION, SOLUTION INTRAMUSCULAR; INTRAVENOUS
Status: DISCONTINUED | OUTPATIENT
Start: 2020-08-06 | End: 2020-08-06 | Stop reason: HOSPADM

## 2020-08-06 RX ORDER — ONDANSETRON 4 MG/1
4 TABLET, ORALLY DISINTEGRATING ORAL EVERY 30 MIN PRN
Status: DISCONTINUED | OUTPATIENT
Start: 2020-08-06 | End: 2020-08-06 | Stop reason: HOSPADM

## 2020-08-06 RX ORDER — ONDANSETRON 2 MG/ML
INJECTION INTRAMUSCULAR; INTRAVENOUS PRN
Status: DISCONTINUED | OUTPATIENT
Start: 2020-08-06 | End: 2020-08-06

## 2020-08-06 RX ORDER — HYDROMORPHONE HYDROCHLORIDE 1 MG/ML
.3-.5 INJECTION, SOLUTION INTRAMUSCULAR; INTRAVENOUS; SUBCUTANEOUS EVERY 5 MIN PRN
Status: DISCONTINUED | OUTPATIENT
Start: 2020-08-06 | End: 2020-08-06 | Stop reason: HOSPADM

## 2020-08-06 RX ORDER — LIDOCAINE HYDROCHLORIDE 20 MG/ML
INJECTION, SOLUTION INFILTRATION; PERINEURAL PRN
Status: DISCONTINUED | OUTPATIENT
Start: 2020-08-06 | End: 2020-08-06

## 2020-08-06 RX ORDER — FENTANYL CITRATE 50 UG/ML
INJECTION, SOLUTION INTRAMUSCULAR; INTRAVENOUS PRN
Status: DISCONTINUED | OUTPATIENT
Start: 2020-08-06 | End: 2020-08-06

## 2020-08-06 RX ADMIN — PHENYLEPHRINE HYDROCHLORIDE 50 MCG: 10 INJECTION INTRAVENOUS at 13:47

## 2020-08-06 RX ADMIN — PHENYLEPHRINE HYDROCHLORIDE 100 MCG: 10 INJECTION INTRAVENOUS at 13:56

## 2020-08-06 RX ADMIN — PHENYLEPHRINE HYDROCHLORIDE 100 MCG: 10 INJECTION INTRAVENOUS at 14:15

## 2020-08-06 RX ADMIN — SUGAMMADEX 150 MG: 100 INJECTION, SOLUTION INTRAVENOUS at 15:36

## 2020-08-06 RX ADMIN — PHENYLEPHRINE HYDROCHLORIDE 100 MCG: 10 INJECTION INTRAVENOUS at 14:59

## 2020-08-06 RX ADMIN — PHENYLEPHRINE HYDROCHLORIDE 100 MCG: 10 INJECTION INTRAVENOUS at 14:09

## 2020-08-06 RX ADMIN — PHENYLEPHRINE HYDROCHLORIDE 50 MCG: 10 INJECTION INTRAVENOUS at 14:01

## 2020-08-06 RX ADMIN — ONDANSETRON 4 MG: 2 INJECTION INTRAMUSCULAR; INTRAVENOUS at 14:52

## 2020-08-06 RX ADMIN — FENTANYL CITRATE 50 MCG: 50 INJECTION, SOLUTION INTRAMUSCULAR; INTRAVENOUS at 14:30

## 2020-08-06 RX ADMIN — PHENYLEPHRINE HYDROCHLORIDE 100 MCG: 10 INJECTION INTRAVENOUS at 14:05

## 2020-08-06 RX ADMIN — SODIUM CHLORIDE, POTASSIUM CHLORIDE, SODIUM LACTATE AND CALCIUM CHLORIDE: 600; 310; 30; 20 INJECTION, SOLUTION INTRAVENOUS at 13:25

## 2020-08-06 RX ADMIN — PHENYLEPHRINE HYDROCHLORIDE 100 MCG: 10 INJECTION INTRAVENOUS at 15:32

## 2020-08-06 RX ADMIN — PHENYLEPHRINE HYDROCHLORIDE 100 MCG: 10 INJECTION INTRAVENOUS at 15:11

## 2020-08-06 RX ADMIN — ROCURONIUM BROMIDE 50 MG: 10 INJECTION INTRAVENOUS at 13:33

## 2020-08-06 RX ADMIN — FENTANYL CITRATE 50 MCG: 50 INJECTION, SOLUTION INTRAMUSCULAR; INTRAVENOUS at 15:41

## 2020-08-06 RX ADMIN — LIDOCAINE HYDROCHLORIDE 100 MG: 20 INJECTION, SOLUTION INFILTRATION; PERINEURAL at 13:33

## 2020-08-06 RX ADMIN — PROPOFOL 180 MG: 10 INJECTION, EMULSION INTRAVENOUS at 13:33

## 2020-08-06 RX ADMIN — FENTANYL CITRATE 100 MCG: 50 INJECTION, SOLUTION INTRAMUSCULAR; INTRAVENOUS at 13:33

## 2020-08-06 RX ADMIN — FENTANYL CITRATE 50 MCG: 50 INJECTION, SOLUTION INTRAMUSCULAR; INTRAVENOUS at 14:41

## 2020-08-06 RX ADMIN — LEVOFLOXACIN 500 MG: 5 INJECTION, SOLUTION INTRAVENOUS at 13:38

## 2020-08-06 ASSESSMENT — MIFFLIN-ST. JEOR: SCORE: 1508.38

## 2020-08-06 NOTE — ANESTHESIA CARE TRANSFER NOTE
Patient: Norberto Almonte    Procedure(s):  ESOPHAGOGASTRODUODENOSCOPY WITH NECROSECTOMY AND CYSTGASTROSTOMY STENT EXCHANGE    Diagnosis: Necrotizing pancreatitis [K85.91]  Diagnosis Additional Information: No value filed.    Anesthesia Type:   General     Note:  Airway :Nasal Cannula  Patient transferred to:PACU  Comments: Vss, report to RN Handoff Report: Identifed the Patient, Identified the Reponsible Provider, Reviewed the pertinent medical history, Discussed the surgical course, Reviewed Intra-OP anesthesia mangement and issues during anesthesia, Set expectations for post-procedure period and Allowed opportunity for questions and acknowledgement of understanding      Vitals: (Last set prior to Anesthesia Care Transfer)    CRNA VITALS  8/6/2020 1520 - 8/6/2020 1558      8/6/2020             Pulse:  83    Ht Rate:  82    SpO2:  98 %                Electronically Signed By: KIM Echavarria CRNA  August 6, 2020  3:58 PM

## 2020-08-06 NOTE — DISCHARGE INSTRUCTIONS
[facility], [place]  Same-Day EGD Procedure  Adult Discharge Orders & Instructions     You had a procedure known as an Esophagogastroduodenoscopy (EGD) of either the upper gastrointestinal (GI) tract. An UPPER EGD will place a camera into either your mouth or nose to examine your esophagus, stomach, and/or small intestines. Biopsies, small samples of tissue, are often taken to help diagnose and/or classify stages of disease growth. The EGD is also used to help locate areas of the GI tract that may require further treatment (dilation, stenting, clipping, removal, etc.) or medical interventions (medication specific).      After your procedure   1. May resume home diet  2. May resume aspirin TODAY 8/6/2020 per Dr Almaguer  For 24 hours after surgery  1. Get plenty of rest.  A responsible adult must stay with you for at least 24 hours after you leave the hospital.   2. Do not drive or use heavy equipment.  If you have weakness or tingling, don't drive or use heavy equipment until this feeling goes away.  3. Do not drink alcohol.  4. Avoid strenuous or risky activities (gym, yoga, cycling, etc.).  Ask for help when climbing stairs.   5. You may feel lightheaded.  IF so, sit for a few minutes before standing.  Have someone help you get up.   6. If you have nausea (feel sick to your stomach): Drink only clear liquids such as apple juice, ginger ale, broth or 7-Up.  Rest may also help.  Be sure to drink enough fluids.  Move to a regular diet as you feel able.  7. If you feel bloated or have too much gas, use a heating pad on your belly to help reduce the discomfort. This should help you feel better.   8. You may have a slight fever. This is normal for the first 24 hours.   9. You may have a dry mouth, a sore throat, muscle aches or trouble sleeping.  These are normal and will go away after 24 hours. A sore throat is most common. Use lozenges or gargle with salt water to ease the discomfort.    10. Do not make important or legal decisions.      Call your doctor for any of the followin. Chest pain, and/or shortness of breath  2. Abdominal  pain, bloating or cramping that has not improved or does not respond to pain reliving medications (Tylenol or narcotics if prescribed)   3. Difficulty swallowing or feeling as though food or liquids are stuck in your throat   4. Sore throat lasting more than 2 days or pain that has worsened over time   5. Black or tarry stools   6. Nausea and/or vomiting that is not resolving or has not responded to anti-nausea medications prescribed to you   7. It has been over 8 to 10 hours since surgery and you are still not able to urinate (pass water)   8. Headache for over 24 hours   9. Fever over 100.5 F (38 C) lasting more than 24 hours after the procedure   10. Signs of jaundice or blockage (fever, chills, abdominal pain, yellowing of the whites of your eyes, yellowing of your skin, and/or passing darker than normal urine)     To contact a doctor, call:   [ ] Dr Almaguer's office at [phone] (Monday thru Friday 8:00am to 4:30pm)   [ ] [phone] and ask for the Gastroenterology resident on call (answered 24 hours a day)   [ ] Emergency Department: [facility]: [phone]   Take it easy when you get home.  Remember, same day surgery DOES NOT MEAN SAME DAY RECOVERY!  Healing is a gradual process.  You will need some time to recover - you may be more tired than you realize at first.  Rest and relax for at least the first 24 hours at home.  You'll feel better and heal faster if you take good care of yourself.

## 2020-08-06 NOTE — OP NOTE
Upper GI Endoscopy  08/06/2020  1:23 PM  [facility], [address] [place] [address] [phone]     Endoscopy Department   _______________________________________________________________________________   Patient Name: Norberto Almonte          Procedure Date: 8/6/2020 1:23 PM   MRN: [id number]                       Account Number: [id number]   YOB: 1955             Admit Type: Outpatient   Age: 64                               Room: OR   Gender: Male                          Note Status: Finalized   Attending MD: César Almaguer MD       Pause for the Cause: time out performed   Total Sedation Time:                     _______________________________________________________________________________       Procedure:           Upper GI endoscopy   Indications:         Walled off necrosis post endoscopic transluminal                        drainage; idiopathic acute pancreatitis in May which                        progressed to walled off necrosis now status post                        endoscopic transluminal drainage on 7/4. S/p one                        instance of endoscopic necrosectomy. Plan for repeat                        today. Has been feeling a lot more bloating with                        abdominal discomfort. No fevers, nausea, vomiting.                        Tolerating PO   Providers:           César Almaguer MD   Referring MD:           Requesting Provider: Nickolas Yin MD   Medicines:           General Anesthesia   Complications:       No immediate complications. Estimated blood loss:                        Minimal.   _______________________________________________________________________________   Procedure:           Pre-Anesthesia Assessment:                        - Prior to the procedure, a History and Physical was                        performed, and patient medications and allergies were                        reviewed. The patient is  competent. The risks and                        benefits of the procedure and the sedation options and                        risks were discussed with the patient. All questions                        were answered and informed consent was obtained. Patient                        identification and proposed procedure were verified by                        the physician, the nurse, the anesthesiologist and the                        anesthetist in the procedure room. Mental Status                        Examination: alert and oriented. Airway Examination:                        normal oropharyngeal airway and neck mobility.                        Respiratory Examination: clear to auscultation. CV                        Examination: normal. Prophylactic Antibiotics: The                        patient requires prophylactic antibiotics pancreatic                        necrosectomy. Prior Anticoagulants: The patient has                        taken no previous anticoagulant or antiplatelet agents.                        ASA Grade Assessment: III - A patient with severe                        systemic disease. After reviewing the risks and                        benefits, the patient was deemed in satisfactory                        condition to undergo the procedure. The anesthesia plan                        was to use general anesthesia. Immediately prior to                        administration of medications, the patient was                        re-assessed for adequacy to receive sedatives. The heart                        rate, respiratory rate, oxygen saturations, blood                        pressure, adequacy of pulmonary ventilation, and                        response to care were monitored throughout the                        procedure. The physical status of the patient was                        re-assessed after the procedure.                        After obtaining informed consent, the endoscope was                         passed under direct vision. Throughout the procedure,                        the patient's blood pressure, pulse, and oxygen                        saturations were monitored continuously. The Endoscope                        was introduced through the mouth, and advanced to the                        second part of duodenum. The upper GI endoscopy was                        accomplished without difficulty. The patient tolerated                        the procedure well.                                                                                     Findings:        Fluoroscopy was utilized throughout this procedure. Previously placed        double pigtail plastic cystgastrostomy stents were found in the gastric        body on the  film. Both pigtail stents were removed endoscopically        with a rattooth forcep. A cystgastrostomy tract was dilated with a 15 mm        Elation balloon. The endoscope was advanced through the cystgastrostomy        demonstrating a large amount of necrotic debris posterior to the stomach        but mostly extending into the LUQ. Endoscopic transluminal necrosectomy        was performed in a methodical manner using different types of snares.        Copious amount of pus and large amount of solid infected necrotic debris        was debrided. A significantly large amount of closely adherent necrotic        debris still remains in the necrotic cavity. A 10 Fr x 5 cm and two 10        Fr x 3 cm double pigtail plastic Solus stents were deployed across        cystgastrostomy tract.                                                                                     Impression:          - Large amount of necrosis seen within the cavity                        posterior to the stomach and mostly extending to the LUQ.                        - Necrosectomy performed as described above. MODIFIER 22                        - Cystgastrostomy double pigtail plastic  stents                        exchanged as above.   Recommendation:      - Discharge patient to home (ambulatory).                        - Repeat CT scan of abd/pelvis with contrast in 2-3                        weeks followed by repeat upper endoscopy with                        necrosectomy in 2 weeks for retreatment.                        - Resume regular diet                        - Above discussed with patient                                                                                       César Almaguer MD

## 2020-08-06 NOTE — ANESTHESIA PREPROCEDURE EVALUATION
"Anesthesia Pre-Procedure Evaluation    Patient: Norberto Almonte   MRN:     [id number] Gender:   male   Age:    64 year old :      1955        Preoperative Diagnosis: Necrotizing pancreatitis [K85.91]   Procedure(s):  ESOPHAGOGASTRODUODENOSCOPY (EGD) with necrosectomy     LABS:  CBC:   Lab Results   Component Value Date    WBC 6.2 2020    WBC 7.1 07/15/2020    HGB 10.0 (L) 2020    HGB 9.7 (L) 07/15/2020    HCT 32.8 (L) 2020    HCT 31.5 (L) 07/15/2020     2020     07/15/2020     BMP:   Lab Results   Component Value Date     2020     07/15/2020    POTASSIUM 4.0 2020    POTASSIUM 3.8 07/15/2020    CHLORIDE 105 2020    CHLORIDE 106 07/15/2020    CO2 27 2020    CO2 25 07/15/2020    BUN 12 2020    BUN 11 07/15/2020    CR 0.74 2020    CR 0.65 (L) 07/15/2020     (H) 2020     (H) 07/15/2020     COAGS:   Lab Results   Component Value Date    INR 1.04 2020     POC:   Lab Results   Component Value Date     (H) 2020     OTHER:   Lab Results   Component Value Date    A1C 6.3 (H) 2020    BARAK 8.7 2020    PHOS 2.6 2020    MAG 2.1 2020    ALBUMIN 2.6 (L) 2020    PROTTOTAL 7.4 2020    ALT 18 2020    AST 14 2020    ALKPHOS 129 2020    BILITOTAL 0.5 2020    .0 (H) 2020        Preop Vitals    BP Readings from Last 3 Encounters:   20 112/59   20 125/67    Pulse Readings from Last 3 Encounters:   20 84      Resp Readings from Last 3 Encounters:   20 16   20 16    SpO2 Readings from Last 3 Encounters:   20 100%   20 96%      Temp Readings from Last 1 Encounters:   20 36.9  C (98.4  F) (Oral)    Ht Readings from Last 1 Encounters:   20 1.753 m (5' 9\")      Wt Readings from Last 1 Encounters:   20 72.8 kg (160 lb 7.9 oz)    Estimated body mass index is 23.7 kg/m  as calculated " "from the following:    Height as of this encounter: 1.753 m (5' 9\").    Weight as of this encounter: 72.8 kg (160 lb 7.9 oz).     LDA:        History reviewed. No pertinent past medical history.   Past Surgical History:   Procedure Laterality Date     ENDOSCOPIC ULTRASOUND, ESOPHAGOSCOPY, GASTROSCOPY, DUODENOSCOPY (EGD), NECROSECTOMY N/A 7/4/2020    Procedure: Endoscopic ultrasound with cyst gastrostomy, dilation and stent placement, nasojejunal feeding tube placement;  Surgeon: César Almaguer MD;  Location: [facility] OR     ENDOSCOPIC ULTRASOUND, ESOPHAGOSCOPY, GASTROSCOPY, DUODENOSCOPY (EGD), NECROSECTOMY N/A 7/15/2020    Procedure: ESOPHAGOGASTRODUODENOSCOPY  WITH ENDOSCOPIC EXCISION OF NECROTIC PANCREATIC TISSUE, STENT EXCHANGE, NJ TUBE REMOVAL;  Surgeon: Segun Prakash MD;  Location: [facility] OR     INSERT TUBE NASOJEJUNOSTOMY  7/4/2020    Procedure: Insert tube nasojejunostomy;  Surgeon: César Almaguer MD;  Location: [facility] OR      No Known Allergies              PHYSICAL EXAM:   Mental Status/Neuro: A/A/O   Airway: Facies: Feasible  Mallampati: I  Mouth/Opening: Full  TM distance: > 6 cm  Neck ROM: Full   Respiratory: Auscultation: CTAB     Resp. Rate: Normal     Resp. Effort: Normal      CV: Rhythm: Regular  Rate: Age appropriate  Heart: Normal Sounds  Edema: None   Comments:      Dental: Normal Dentition                Assessment:   ASA SCORE: 3    H&P: History and physical reviewed and following examination; no interval change.    NPO Status: NPO Appropriate     Plan:   Anes. Type:  General   Pre-Medication: None   Induction:  IV (Standard)   Airway: ETT; Oral   Access/Monitoring: PIV   Maintenance: Balanced     Postop Plan:   Postop Pain: Opioids  Postop Sedation/Airway: Not planned     PONV Management:   Adult Risk Factors:, Postop Opioids     CONSENT: Direct conversation   Plan and risks discussed with: Patient   Blood Products: Consent Deferred (Minimal Blood Loss)                   Christopher J. Behrens, MD  "

## 2020-08-06 NOTE — ANESTHESIA POSTPROCEDURE EVALUATION
Anesthesia POST Procedure Evaluation    Patient: Norberto Almonte   MRN:     [id number] Gender:   male   Age:    64 year old :      1955        Preoperative Diagnosis: Necrotizing pancreatitis [K85.91]   Procedure(s):  ESOPHAGOGASTRODUODENOSCOPY WITH NECROSECTOMY AND CYSTGASTROSTOMY STENT EXCHANGE   Postop Comments: No value filed.     Anesthesia Type: General       Disposition: Admission   Postop Pain Control: Uneventful            Sign Out: Well controlled pain   PONV: No   Neuro/Psych: Uneventful            Sign Out: Acceptable/Baseline neuro status   Airway/Respiratory: Uneventful            Sign Out: Acceptable/Baseline resp. status   CV/Hemodynamics: Uneventful            Sign Out: Acceptable CV status   Other NRE: NONE   DID A NON-ROUTINE EVENT OCCUR? No         Last Anesthesia Record Vitals:  CRNA VITALS  2020 1520 - 2020 1620      2020             Pulse:  83    Ht Rate:  82    SpO2:  98 %          Last PACU Vitals:  Vitals Value Taken Time   /62 2020  4:30 PM   Temp 36.7  C (98.1  F) 2020  4:25 PM   Pulse 83 2020  4:30 PM   Resp 14 2020  4:15 PM   SpO2 97 % 2020  4:42 PM   Temp src     NIBP     Pulse     SpO2     Resp     Temp     Ht Rate     Temp 2     Vitals shown include unvalidated device data.      Electronically Signed By: Lindsay Butt MD, 2020, 5:13 PM

## 2020-08-06 NOTE — OR NURSING
Dr Almaguer verified patient is ok to resume home aspirin today.  Discharge instructions provided and reviewed with Guru (friend), designated caregiver. Printed after visit summary sent home with patient. Understanding verbalized.

## 2020-08-07 ENCOUNTER — PREP FOR PROCEDURE (OUTPATIENT)
Dept: GASTROENTEROLOGY | Facility: CLINIC | Age: 65
End: 2020-08-07

## 2020-08-07 ENCOUNTER — PATIENT OUTREACH (OUTPATIENT)
Dept: GASTROENTEROLOGY | Facility: CLINIC | Age: 65
End: 2020-08-07

## 2020-08-07 DIAGNOSIS — K85.91 NECROTIZING PANCREATITIS: Primary | ICD-10-CM

## 2020-08-07 NOTE — PROGRESS NOTES
Care Coordination Telephone Call  [facility]     Called patient in follow up to procedure done with Dr. Almaguer.  He is feeling well.     CT scheduled for today needs to be moved out a couple of weeks.  We will attempt to arrange at [facility] and patient will bring CD with him for OR procedure 8/24/20.    I have asked the patient to call with any additional questions or concerns and have provided my contact information.    Plan:  CT to [facility] at [facility] 8/20 or 8/21;  [facility] and [facility] will [facility]id orders to [phone]  and necrosectomy 8/24/20    Libra GANN, HNBC, STAR-T  RN Care Coordinator  [facility]  Ph: [phone]  FAX: [phone]

## 2020-08-10 DIAGNOSIS — Z11.59 ENCOUNTER FOR SCREENING FOR OTHER VIRAL DISEASES: Primary | ICD-10-CM

## 2020-08-11 ENCOUNTER — TELEPHONE (OUTPATIENT)
Dept: ONCOLOGY | Facility: CLINIC | Age: 65
End: 2020-08-11

## 2020-08-11 NOTE — TELEPHONE ENCOUNTER
CT order faxed per request below.  Zaynab Ruby CMA ([facility])      ----- Message from Jane Palm sent at 8/11/2020  9:38 AM CDT -----  Regarding: please fax...  Ct order to [facility] ctr [phone]  thanks  ----- Message -----  From: Libra aGrcia, MCKENZIE  Sent: 8/10/2020   9:17 AM CDT  To: Jane Palm, Libra Garcia, RN  Subject: move CT - does not need to be done today 8/10    Hi Cat    He is scheduled for a CT today but needs to be moved out to either the 20th or 21st of August.  He would like to arrange at [facility] and will bring a disc when he comes for procedure on 8/24/20 with Tripp.  Could you please call him?      He is expecting your call.     Thanks!  OLINDA

## 2020-08-12 ENCOUNTER — TELEPHONE (OUTPATIENT)
Dept: ONCOLOGY | Facility: CLINIC | Age: 65
End: 2020-08-12

## 2020-08-12 NOTE — TELEPHONE ENCOUNTER
Requested order from message below [facility]ed to [facility] at [phone].     Successful transmission verified by RightFax.     Sangeetha Choudhury CMA      ----- Message -----  From: Libra Garcia RN  Sent: 8/11/2020   8:15 AM CDT  To: [id number]  Subject: fax pls                                          Hi all  [facility] [place] at [phone].  Thanks much!  MK

## 2020-08-17 ENCOUNTER — PATIENT OUTREACH (OUTPATIENT)
Dept: GASTROENTEROLOGY | Facility: CLINIC | Age: 65
End: 2020-08-17

## 2020-08-17 NOTE — PROGRESS NOTES
Care Coordination Telephone Call  [facility]     Called patient to check on CT 8/20 at 7:30 am - he will bring the disc with him for procedure.     [facility] [place] [facility] pm.     Reviewed preop instructions and confirmed address.  He will call his ride.      I have asked the patient to call with any additional questions or concerns and have provided my contact information.    Libra GANN, HNBC, STAR-T  RN Care Coordinator  [facility]  Ph: [phone]  FAX: [phone]

## 2020-08-20 ENCOUNTER — TRANSFERRED RECORDS (OUTPATIENT)
Dept: HEALTH INFORMATION MANAGEMENT | Facility: CLINIC | Age: 65
End: 2020-08-20

## 2020-08-20 LAB
CREAT SERPL-MCNC: 0.75 MG/DL (ref 0.7–1.3)
GFR SERPL CREATININE-BSD FRML MDRD: >60 ML/MIN/1.73M2

## 2020-08-23 ENCOUNTER — ANESTHESIA EVENT (OUTPATIENT)
Dept: SURGERY | Facility: CLINIC | Age: 65
End: 2020-08-23
Payer: COMMERCIAL

## 2020-08-24 ENCOUNTER — ANESTHESIA (OUTPATIENT)
Dept: SURGERY | Facility: CLINIC | Age: 65
End: 2020-08-24
Payer: COMMERCIAL

## 2020-08-24 ENCOUNTER — APPOINTMENT (OUTPATIENT)
Dept: GENERAL RADIOLOGY | Facility: CLINIC | Age: 65
End: 2020-08-24
Attending: INTERNAL MEDICINE
Payer: COMMERCIAL

## 2020-08-24 ENCOUNTER — HOSPITAL ENCOUNTER (OUTPATIENT)
Facility: CLINIC | Age: 65
Discharge: HOME | End: 2020-08-24
Attending: INTERNAL MEDICINE | Admitting: INTERNAL MEDICINE
Payer: COMMERCIAL

## 2020-08-24 VITALS
HEIGHT: 69 IN | OXYGEN SATURATION: 98 % | RESPIRATION RATE: 16 BRPM | TEMPERATURE: 98.6 F | BODY MASS INDEX: 23.77 KG/M2 | SYSTOLIC BLOOD PRESSURE: 105 MMHG | WEIGHT: 160.5 LBS | DIASTOLIC BLOOD PRESSURE: 55 MMHG | HEART RATE: 87 BPM

## 2020-08-24 DIAGNOSIS — K85.91 NECROTIZING PANCREATITIS: ICD-10-CM

## 2020-08-24 LAB
ALBUMIN SERPL-MCNC: 2.5 G/DL (ref 3.4–5)
ALP SERPL-CCNC: 73 U/L (ref 40–150)
ALT SERPL W P-5'-P-CCNC: 14 U/L (ref 0–70)
ANION GAP SERPL CALCULATED.3IONS-SCNC: 4 MMOL/L (ref 3–14)
AST SERPL W P-5'-P-CCNC: 11 U/L (ref 0–45)
BILIRUB SERPL-MCNC: 0.4 MG/DL (ref 0.2–1.3)
BUN SERPL-MCNC: 10 MG/DL (ref 7–30)
CALCIUM SERPL-MCNC: 9 MG/DL (ref 8.5–10.1)
CHLORIDE SERPL-SCNC: 103 MMOL/L (ref 94–109)
CO2 SERPL-SCNC: 28 MMOL/L (ref 20–32)
CREAT SERPL-MCNC: 0.7 MG/DL (ref 0.66–1.25)
ERYTHROCYTE [DISTWIDTH] IN BLOOD BY AUTOMATED COUNT: 17.2 % (ref 10–15)
GFR SERPL CREATININE-BSD FRML MDRD: >90 ML/MIN/{1.73_M2}
GLUCOSE BLDC GLUCOMTR-MCNC: 116 MG/DL (ref 70–99)
GLUCOSE BLDC GLUCOMTR-MCNC: 129 MG/DL (ref 70–99)
GLUCOSE SERPL-MCNC: 128 MG/DL (ref 70–99)
HCT VFR BLD AUTO: 33.5 % (ref 40–53)
HGB BLD-MCNC: 10.2 G/DL (ref 13.3–17.7)
INR PPP: 1.26 (ref 0.86–1.14)
MCH RBC QN AUTO: 26.5 PG (ref 26.5–33)
MCHC RBC AUTO-ENTMCNC: 30.4 G/DL (ref 31.5–36.5)
MCV RBC AUTO: 87 FL (ref 78–100)
PLATELET # BLD AUTO: 299 10E9/L (ref 150–450)
POTASSIUM SERPL-SCNC: 3.9 MMOL/L (ref 3.4–5.3)
PROT SERPL-MCNC: 8.2 G/DL (ref 6.8–8.8)
RBC # BLD AUTO: 3.85 10E12/L (ref 4.4–5.9)
SODIUM SERPL-SCNC: 136 MMOL/L (ref 133–144)
WBC # BLD AUTO: 7.2 10E9/L (ref 4–11)

## 2020-08-24 PROCEDURE — C1726 CATH, BAL DIL, NON-VASCULAR: HCPCS | Performed by: INTERNAL MEDICINE

## 2020-08-24 PROCEDURE — 71000014 ZZH RECOVERY PHASE 1 LEVEL 2 FIRST HR: Performed by: INTERNAL MEDICINE

## 2020-08-24 PROCEDURE — 25000125 ZZHC RX 250: Performed by: NURSE ANESTHETIST, CERTIFIED REGISTERED

## 2020-08-24 PROCEDURE — 85610 PROTHROMBIN TIME: CPT | Performed by: INTERNAL MEDICINE

## 2020-08-24 PROCEDURE — 27210794 ZZH OR GENERAL SUPPLY STERILE: Performed by: INTERNAL MEDICINE

## 2020-08-24 PROCEDURE — 37000009 ZZH ANESTHESIA TECHNICAL FEE, EACH ADDTL 15 MIN: Performed by: INTERNAL MEDICINE

## 2020-08-24 PROCEDURE — 82962 GLUCOSE BLOOD TEST: CPT

## 2020-08-24 PROCEDURE — 71000027 ZZH RECOVERY PHASE 2 EACH 15 MINS: Performed by: INTERNAL MEDICINE

## 2020-08-24 PROCEDURE — 25000128 H RX IP 250 OP 636: Performed by: NURSE ANESTHETIST, CERTIFIED REGISTERED

## 2020-08-24 PROCEDURE — 25500064 ZZH RX 255 OP 636: Performed by: INTERNAL MEDICINE

## 2020-08-24 PROCEDURE — C1876 STENT, NON-COA/NON-COV W/DEL: HCPCS | Performed by: INTERNAL MEDICINE

## 2020-08-24 PROCEDURE — 40000277 XR SURGERY CARM FLUORO LESS THAN 5 MIN W STILLS: Mod: TC

## 2020-08-24 PROCEDURE — 40000171 ZZH STATISTIC PRE-PROCEDURE ASSESSMENT III: Performed by: INTERNAL MEDICINE

## 2020-08-24 PROCEDURE — 85027 COMPLETE CBC AUTOMATED: CPT | Performed by: INTERNAL MEDICINE

## 2020-08-24 PROCEDURE — 25800030 ZZH RX IP 258 OP 636: Performed by: NURSE ANESTHETIST, CERTIFIED REGISTERED

## 2020-08-24 PROCEDURE — 25000565 ZZH ISOFLURANE, EA 15 MIN: Performed by: INTERNAL MEDICINE

## 2020-08-24 PROCEDURE — 36000061 ZZH SURGERY LEVEL 3 W FLUORO 1ST 30 MIN - UMMC: Performed by: INTERNAL MEDICINE

## 2020-08-24 PROCEDURE — 36415 COLL VENOUS BLD VENIPUNCTURE: CPT | Performed by: INTERNAL MEDICINE

## 2020-08-24 PROCEDURE — 36000059 ZZH SURGERY LEVEL 3 EA 15 ADDTL MIN UMMC: Performed by: INTERNAL MEDICINE

## 2020-08-24 PROCEDURE — 37000008 ZZH ANESTHESIA TECHNICAL FEE, 1ST 30 MIN: Performed by: INTERNAL MEDICINE

## 2020-08-24 PROCEDURE — 25000125 ZZHC RX 250: Performed by: INTERNAL MEDICINE

## 2020-08-24 PROCEDURE — 80053 COMPREHEN METABOLIC PANEL: CPT | Performed by: INTERNAL MEDICINE

## 2020-08-24 DEVICE — STENT SOLUS BILIARY 10FRX07CM DBL PIGTAIL W/INTRO G25673: Type: IMPLANTABLE DEVICE | Site: STOMACH | Status: FUNCTIONAL

## 2020-08-24 DEVICE — STENT SOLUS BILIARY 10FRX03CM DBL PIGTAIL W/INTRO G25670: Type: IMPLANTABLE DEVICE | Site: STOMACH | Status: FUNCTIONAL

## 2020-08-24 RX ORDER — LIDOCAINE 40 MG/G
CREAM TOPICAL
Status: DISCONTINUED | OUTPATIENT
Start: 2020-08-24 | End: 2020-08-24 | Stop reason: HOSPADM

## 2020-08-24 RX ORDER — NALOXONE HYDROCHLORIDE 0.4 MG/ML
.1-.4 INJECTION, SOLUTION INTRAMUSCULAR; INTRAVENOUS; SUBCUTANEOUS
Status: DISCONTINUED | OUTPATIENT
Start: 2020-08-24 | End: 2020-08-24

## 2020-08-24 RX ORDER — NALOXONE HYDROCHLORIDE 0.4 MG/ML
.1-.4 INJECTION, SOLUTION INTRAMUSCULAR; INTRAVENOUS; SUBCUTANEOUS
Status: DISCONTINUED | OUTPATIENT
Start: 2020-08-24 | End: 2020-08-24 | Stop reason: HOSPADM

## 2020-08-24 RX ORDER — LABETALOL 20 MG/4 ML (5 MG/ML) INTRAVENOUS SYRINGE
10
Status: DISCONTINUED | OUTPATIENT
Start: 2020-08-24 | End: 2020-08-24 | Stop reason: HOSPADM

## 2020-08-24 RX ORDER — IOPAMIDOL 510 MG/ML
INJECTION, SOLUTION INTRAVASCULAR PRN
Status: DISCONTINUED | OUTPATIENT
Start: 2020-08-24 | End: 2020-08-24 | Stop reason: HOSPADM

## 2020-08-24 RX ORDER — FENTANYL CITRATE 50 UG/ML
INJECTION, SOLUTION INTRAMUSCULAR; INTRAVENOUS PRN
Status: DISCONTINUED | OUTPATIENT
Start: 2020-08-24 | End: 2020-08-24

## 2020-08-24 RX ORDER — SODIUM CHLORIDE, SODIUM LACTATE, POTASSIUM CHLORIDE, CALCIUM CHLORIDE 600; 310; 30; 20 MG/100ML; MG/100ML; MG/100ML; MG/100ML
INJECTION, SOLUTION INTRAVENOUS CONTINUOUS
Status: DISCONTINUED | OUTPATIENT
Start: 2020-08-24 | End: 2020-08-24 | Stop reason: HOSPADM

## 2020-08-24 RX ORDER — SODIUM CHLORIDE, SODIUM LACTATE, POTASSIUM CHLORIDE, CALCIUM CHLORIDE 600; 310; 30; 20 MG/100ML; MG/100ML; MG/100ML; MG/100ML
INJECTION, SOLUTION INTRAVENOUS CONTINUOUS PRN
Status: DISCONTINUED | OUTPATIENT
Start: 2020-08-24 | End: 2020-08-24

## 2020-08-24 RX ORDER — PROPOFOL 10 MG/ML
INJECTION, EMULSION INTRAVENOUS PRN
Status: DISCONTINUED | OUTPATIENT
Start: 2020-08-24 | End: 2020-08-24

## 2020-08-24 RX ORDER — LIDOCAINE HYDROCHLORIDE 20 MG/ML
INJECTION, SOLUTION INFILTRATION; PERINEURAL PRN
Status: DISCONTINUED | OUTPATIENT
Start: 2020-08-24 | End: 2020-08-24

## 2020-08-24 RX ORDER — ONDANSETRON 2 MG/ML
4 INJECTION INTRAMUSCULAR; INTRAVENOUS EVERY 30 MIN PRN
Status: DISCONTINUED | OUTPATIENT
Start: 2020-08-24 | End: 2020-08-24 | Stop reason: HOSPADM

## 2020-08-24 RX ORDER — LEVOFLOXACIN 5 MG/ML
INJECTION, SOLUTION INTRAVENOUS PRN
Status: DISCONTINUED | OUTPATIENT
Start: 2020-08-24 | End: 2020-08-24

## 2020-08-24 RX ORDER — FENTANYL CITRATE 50 UG/ML
25-50 INJECTION, SOLUTION INTRAMUSCULAR; INTRAVENOUS
Status: DISCONTINUED | OUTPATIENT
Start: 2020-08-24 | End: 2020-08-24 | Stop reason: HOSPADM

## 2020-08-24 RX ORDER — ONDANSETRON 2 MG/ML
INJECTION INTRAMUSCULAR; INTRAVENOUS PRN
Status: DISCONTINUED | OUTPATIENT
Start: 2020-08-24 | End: 2020-08-24

## 2020-08-24 RX ORDER — HYDROMORPHONE HYDROCHLORIDE 1 MG/ML
.3-.5 INJECTION, SOLUTION INTRAMUSCULAR; INTRAVENOUS; SUBCUTANEOUS EVERY 5 MIN PRN
Status: DISCONTINUED | OUTPATIENT
Start: 2020-08-24 | End: 2020-08-24 | Stop reason: HOSPADM

## 2020-08-24 RX ORDER — FLUMAZENIL 0.1 MG/ML
0.2 INJECTION, SOLUTION INTRAVENOUS
Status: DISCONTINUED | OUTPATIENT
Start: 2020-08-24 | End: 2020-08-24 | Stop reason: HOSPADM

## 2020-08-24 RX ORDER — ONDANSETRON 4 MG/1
4 TABLET, ORALLY DISINTEGRATING ORAL EVERY 30 MIN PRN
Status: DISCONTINUED | OUTPATIENT
Start: 2020-08-24 | End: 2020-08-24 | Stop reason: HOSPADM

## 2020-08-24 RX ADMIN — SUGAMMADEX 200 MG: 100 INJECTION, SOLUTION INTRAVENOUS at 09:27

## 2020-08-24 RX ADMIN — MIDAZOLAM 2 MG: 1 INJECTION INTRAMUSCULAR; INTRAVENOUS at 07:39

## 2020-08-24 RX ADMIN — ONDANSETRON 4 MG: 2 INJECTION INTRAMUSCULAR; INTRAVENOUS at 09:06

## 2020-08-24 RX ADMIN — LIDOCAINE HYDROCHLORIDE 100 MG: 20 INJECTION, SOLUTION INFILTRATION; PERINEURAL at 07:47

## 2020-08-24 RX ADMIN — ROCURONIUM BROMIDE 20 MG: 10 INJECTION INTRAVENOUS at 07:52

## 2020-08-24 RX ADMIN — PHENYLEPHRINE HYDROCHLORIDE 100 MCG: 10 INJECTION INTRAVENOUS at 09:05

## 2020-08-24 RX ADMIN — Medication 80 MG: at 07:47

## 2020-08-24 RX ADMIN — FENTANYL CITRATE 50 MCG: 50 INJECTION, SOLUTION INTRAMUSCULAR; INTRAVENOUS at 07:48

## 2020-08-24 RX ADMIN — SODIUM CHLORIDE, POTASSIUM CHLORIDE, SODIUM LACTATE AND CALCIUM CHLORIDE: 600; 310; 30; 20 INJECTION, SOLUTION INTRAVENOUS at 07:39

## 2020-08-24 RX ADMIN — PHENYLEPHRINE HYDROCHLORIDE 100 MCG: 10 INJECTION INTRAVENOUS at 08:53

## 2020-08-24 RX ADMIN — LEVOFLOXACIN 500 MG: 5 INJECTION, SOLUTION INTRAVENOUS at 08:00

## 2020-08-24 RX ADMIN — PROPOFOL 100 MG: 10 INJECTION, EMULSION INTRAVENOUS at 07:47

## 2020-08-24 RX ADMIN — ROCURONIUM BROMIDE 10 MG: 10 INJECTION INTRAVENOUS at 08:13

## 2020-08-24 ASSESSMENT — MIFFLIN-ST. JEOR: SCORE: 1506.76

## 2020-08-24 NOTE — PROGRESS NOTES
1050 GI team paged, patient requesting update on procedure prior to discharge. Awaiting return call.  1051 Team to be at bedside approx 10 minutes.  1105 Dr Doyle at bedside. Reviewed with patient

## 2020-08-24 NOTE — ANESTHESIA PREPROCEDURE EVALUATION
"Anesthesia Pre-Procedure Evaluation    Patient: Norberto Almonte   MRN:     [id number] Gender:   male   Age:    64 year old :      1955        Preoperative Diagnosis: Necrotizing pancreatitis [K85.91]   Procedure(s):  ESOPHAGOGASTRODUODENOSCOPY (EGD) with necrosectomy     LABS:  CBC:   Lab Results   Component Value Date    WBC 9.7 2020    WBC 6.2 2020    HGB 9.3 (L) 2020    HGB 10.0 (L) 2020    HCT 31.1 (L) 2020    HCT 32.8 (L) 2020     2020     2020     BMP:   Lab Results   Component Value Date     2020     2020    POTASSIUM 4.2 2020    POTASSIUM 4.0 2020    CHLORIDE 102 2020    CHLORIDE 105 2020    CO2 28 2020    CO2 27 2020    BUN 10 2020    BUN 12 2020    CR 0.75 2020    CR 0.71 2020     (H) 2020     (H) 2020     COAGS:   Lab Results   Component Value Date    INR 1.29 (H) 2020     POC:   Lab Results   Component Value Date    BGM 73 2020     OTHER:   Lab Results   Component Value Date    A1C 6.3 (H) 2020    BARAK 8.7 2020    PHOS 2.6 2020    MAG 2.1 2020    ALBUMIN 2.2 (L) 2020    PROTTOTAL 7.7 2020    ALT 30 2020    AST 22 2020    ALKPHOS 72 2020    BILITOTAL 0.4 2020    LIPASE 73 2020    AMYLASE 20 (L) 2020    .0 (H) 2020        Preop Vitals    BP Readings from Last 3 Encounters:   20 107/60   20 125/67    Pulse Readings from Last 3 Encounters:   20 95   20 84      Resp Readings from Last 3 Encounters:   20 14   20 16    SpO2 Readings from Last 3 Encounters:   20 100%   20 96%      Temp Readings from Last 1 Encounters:   20 36.9  C (98.4  F) (Oral)    Ht Readings from Last 1 Encounters:   20 1.753 m (5' 9\")      Wt Readings from Last 1 Encounters:   20 72.8 kg (160 lb 7.9 oz) " "   Estimated body mass index is 23.7 kg/m  as calculated from the following:    Height as of 8/6/20: 1.753 m (5' 9\").    Weight as of 8/6/20: 72.8 kg (160 lb 7.9 oz).     LDA:        Past Medical History:   Diagnosis Date     Diabetes (H)       Past Surgical History:   Procedure Laterality Date     ENDOSCOPIC ULTRASOUND, ESOPHAGOSCOPY, GASTROSCOPY, DUODENOSCOPY (EGD), NECROSECTOMY N/A 7/4/2020    Procedure: Endoscopic ultrasound with cyst gastrostomy, dilation and stent placement, nasojejunal feeding tube placement;  Surgeon: César Almaguer MD;  Location: [facility] OR     ENDOSCOPIC ULTRASOUND, ESOPHAGOSCOPY, GASTROSCOPY, DUODENOSCOPY (EGD), NECROSECTOMY N/A 7/15/2020    Procedure: ESOPHAGOGASTRODUODENOSCOPY  WITH ENDOSCOPIC EXCISION OF NECROTIC PANCREATIC TISSUE, STENT EXCHANGE, NJ TUBE REMOVAL;  Surgeon: Segun Prakash MD;  Location: [facility] OR     ENDOSCOPIC ULTRASOUND, ESOPHAGOSCOPY, GASTROSCOPY, DUODENOSCOPY (EGD), NECROSECTOMY N/A 8/6/2020    Procedure: ESOPHAGOGASTRODUODENOSCOPY WITH NECROSECTOMY AND CYSTGASTROSTOMY STENT EXCHANGE;  Surgeon: César Almaguer MD;  Location: [facility] OR     INSERT TUBE NASOJEJUNOSTOMY  7/4/2020    Procedure: Insert tube nasojejunostomy;  Surgeon: César Almaguer MD;  Location: [facility] OR      No Known Allergies     Anesthesia Evaluation     . Pt has had prior anesthetic. Type: General           ROS/MED HX    ENT/Pulmonary:  - neg pulmonary ROS     Neurologic:  - neg neurologic ROS     Cardiovascular:     (+) hypertension----. : . . . :. .       METS/Exercise Tolerance:     Hematologic: Comments: Splenic + SMV vein thrombosis         Musculoskeletal:  - neg musculoskeletal ROS       GI/Hepatic:     (+) Other GI/Hepatic necrotizing pancreatitis      Renal/Genitourinary:  - ROS Renal section negative       Endo:     (+) type II DM Using insulin .      Psychiatric:  - neg psychiatric ROS       Infectious Disease:  - neg infectious disease ROS       Malignancy:      - no malignancy   Other:     "                     PHYSICAL EXAM:   Mental Status/Neuro:    Airway: Facies: Feasible  Mallampati: II  Mouth/Opening: Full  TM distance: > 6 cm  Neck ROM: Full   Respiratory: Auscultation: CTAB     Resp. Rate: Normal     Resp. Effort: Normal      CV: Rhythm: Regular  Rate: Age appropriate  Heart: Normal Sounds  Edema: None   Comments:      Dental: Normal Dentition                Assessment:   ASA SCORE: 3    H&P: History and physical reviewed and following examination; no interval change.         Plan:   Anes. Type:  General   Pre-Medication: None   Induction:  IV (RSI)   Airway: ETT; Oral   Access/Monitoring: PIV   Maintenance: Balanced     Postop Plan:   Postop Pain: Opioids  Postop Sedation/Airway: Not planned  Disposition: Outpatient     PONV Management:   Adult Risk Factors:, Postop Opioids   Prevention: Ondansetron, Dexamethasone     CONSENT: Direct conversation   Plan and risks discussed with: Patient   Blood Products: Consent Deferred (Minimal Blood Loss)       Comments for Plan/Consent:  Patient was chewing gum in pre-op.  Discussed increased risk of aspiration with patient and senior member of anesthesia department.  Given the easy airway history it was deemed appropriate to proceed with the case with an RSI induction.                 Keith Sullivan MD

## 2020-08-24 NOTE — OR NURSING
"\" FANNIEI pt [place] - took last dose of Aspirin 81mg & fish oil 8/23/20.  thanks bandar *[id number]\"  This text message sent to MD Almaguer @ [phone].    Regarding COVID testing - I personally was able to confirm with [facility] had covid negative result completed 8/20/20.  MARIANA Sullivan & MD Almaguer notified of these results this AM.      Pt declined to lock wallet with security this AM.   "

## 2020-08-24 NOTE — ANESTHESIA CARE TRANSFER NOTE
Patient: Norberto Almonte    Procedure(s):  ESOPHAGOGASTRODUODENOSCOPY (EGD) with necrosectomy aND STENTS EXCHANGED, balloon dilation    Diagnosis: Necrotizing pancreatitis [K85.91]  Diagnosis Additional Information: No value filed.    Anesthesia Type:   General     Note:  Airway :Face Mask  Patient transferred to:PACU  Handoff Report: Identifed the Patient, Identified the Reponsible Provider, Reviewed the pertinent medical history, Discussed the surgical course, Reviewed Intra-OP anesthesia mangement and issues during anesthesia, Set expectations for post-procedure period and Allowed opportunity for questions and acknowledgement of understanding      Vitals: (Last set prior to Anesthesia Care Transfer)    CRNA VITALS  8/24/2020 0902 - 8/24/2020 0938      8/24/2020             EKG:  NSR                Electronically Signed By: KIM Flores CRNA  August 24, 2020  9:38 AM

## 2020-08-24 NOTE — ANESTHESIA POSTPROCEDURE EVALUATION
Anesthesia POST Procedure Evaluation    Patient: Norberto Almonte   MRN:     [id number] Gender:   male   Age:    64 year old :      1955        Preoperative Diagnosis: Necrotizing pancreatitis [K85.91]   Procedure(s):  ESOPHAGOGASTRODUODENOSCOPY (EGD) with necrosectomy aND STENTS EXCHANGED, balloon dilation   Postop Comments: No value filed.     Anesthesia Type: General       Disposition: Outpatient   Postop Pain Control: Uneventful            Sign Out: Well controlled pain   PONV: No   Neuro/Psych: Uneventful            Sign Out: Acceptable/Baseline neuro status   Airway/Respiratory: Uneventful            Sign Out: Acceptable/Baseline resp. status   CV/Hemodynamics: Uneventful            Sign Out: Acceptable CV status   Other NRE: NONE   DID A NON-ROUTINE EVENT OCCUR? No         Last Anesthesia Record Vitals:  CRNA VITALS  2020 0902 - 2020 1002      2020             EKG:  NSR          Last PACU Vitals:  Vitals Value Taken Time   /58 2020 10:10 AM   Temp 37.2  C (98.9  F) 2020 10:00 AM   Pulse 85 2020 10:12 AM   Resp 18 2020 10:00 AM   SpO2 96 % 2020 10:12 AM   Temp src     NIBP     Pulse     SpO2     Resp     Temp     Ht Rate     Temp 2     Vitals shown include unvalidated device data.      Electronically Signed By: Keith Sullivan MD, 2020, 10:14 AM

## 2020-08-24 NOTE — BRIEF OP NOTE
[facility] Brief Operative Note    Pre-operative diagnosis: Necrotizing pancreatitis [K85.91]   Post-operative diagnosis Walled off necrosis   Procedure: Procedure(s):  ESOPHAGOGASTRODUODENOSCOPY (EGD) with necrosectomy aND STENTS EXCHANGED, balloon dilation   Surgeon: César Almaguer MD   Assistants(s): Nickolas Doyle MD   Estimated blood loss: Minimal    Specimens: None   Findings: - EGD: Edematous proximal duodenum with large amount of food and secretions in stomach, suggestive of partial gastric outlet obstruction.  - Extraction of 2 indwelling cystgastrostomy DPPSs and 1 indwelling cystgastrostomy straight plastic stent.  - Dilation of cystgastrostomy tract via 15-mm Elation balloon.  - Direct endoscopic necrosectomy using snare.  - Deployment of cystgastrostomy 10-Fr x 7-cm Solus biliary DPPS into WON.  - Deployment of cystgastrostomy 10-Fr x 3-cm Solus biliary DPPS into WON.     Recommendations:  - Observe in PACU and discharge.      Nickolas Doyle MD on 8/24/2020 at 9:33 AM

## 2020-08-25 ENCOUNTER — PATIENT OUTREACH (OUTPATIENT)
Dept: GASTROENTEROLOGY | Facility: CLINIC | Age: 65
End: 2020-08-25

## 2020-08-25 DIAGNOSIS — K85.91 NECROTIZING PANCREATITIS: Primary | ICD-10-CM

## 2020-08-25 LAB — UPPER GI ENDOSCOPY: NORMAL

## 2020-08-27 ENCOUNTER — PATIENT OUTREACH (OUTPATIENT)
Dept: GASTROENTEROLOGY | Facility: CLINIC | Age: 65
End: 2020-08-27

## 2020-08-27 NOTE — PROGRESS NOTES
"Care Coordination Telephone Call  [facility]     Called patient to check on how he doing.     He relates that he is having a \"little discomfort\" that is about 2/3 out of 10.  Not taking anything for procedure. \"Milder version of what I had before last procedure.\"    Denies fever chills, nausea vomiting.     I have asked the patient to call with any additional questions or concerns and have provided my contact information.    Plan:  Ct to be done at [facility] and fax the order to them.  Will plan for 9/10 for Ct and he will call arrange. Plan to have Ct faxed to us.   Virtual visit with Dr. Almaguer on 9/16/20 and he will drive to [place] for visit.     Libra GANN, HNBC, STAR-T  RN Care Coordinator  [facility]  Ph: [phone]  FAX: [phone]          "

## 2020-09-02 ENCOUNTER — DOCUMENTATION ONLY (OUTPATIENT)
Dept: ONCOLOGY | Facility: CLINIC | Age: 65
End: 2020-09-02

## 2020-09-02 NOTE — PROGRESS NOTES
"Message received from Clinic Coordinator:      \"Please fax orders to [facility], [place]     Fax number [phone]     Please make sure we get the results.     Please put on the sheet appt for 9/10     Thank you \"      Orders faxed. Confirmation verified via rightfax.      Sangeetha Johnson CMA    "

## 2020-09-10 ENCOUNTER — TRANSFERRED RECORDS (OUTPATIENT)
Dept: HEALTH INFORMATION MANAGEMENT | Facility: CLINIC | Age: 65
End: 2020-09-10

## 2020-09-16 ENCOUNTER — PATIENT OUTREACH (OUTPATIENT)
Dept: ONCOLOGY | Facility: CLINIC | Age: 65
End: 2020-09-16

## 2020-09-16 ENCOUNTER — PATIENT OUTREACH (OUTPATIENT)
Dept: GASTROENTEROLOGY | Facility: CLINIC | Age: 65
End: 2020-09-16

## 2020-09-16 ENCOUNTER — VIRTUAL VISIT (OUTPATIENT)
Dept: GASTROENTEROLOGY | Facility: CLINIC | Age: 65
End: 2020-09-16
Attending: INTERNAL MEDICINE
Payer: OTHER GOVERNMENT

## 2020-09-16 DIAGNOSIS — K85.91 NECROTIZING PANCREATITIS: Primary | ICD-10-CM

## 2020-09-16 PROBLEM — I10 ESSENTIAL HYPERTENSION: Status: ACTIVE | Noted: 2020-04-28

## 2020-09-16 PROCEDURE — 40001009 ZZH VIDEO/TELEPHONE VISIT; NO CHARGE

## 2020-09-16 NOTE — PROGRESS NOTES
Writer placed fax to [facility] at the request of Libra Garcia, RNCC, for MR Abdomen MRCP w/o & w Contrast orders placed by Dr Almaguer today to be completed 3 months from now. Confirmation below:          Writer placed additional fax to [phone] for hepatic panel order. Confirmation below.

## 2020-09-16 NOTE — PROGRESS NOTES
"Norberto Almonte is a 64 year old male who is being evaluated via a billable telephone visit.      The patient has been notified of following:     \"This telephone visit will be conducted via a call between you and your physician/provider. We have found that certain health care needs can be provided without the need for a physical exam.  This service lets us provide the care you need with a short phone conversation.  If a prescription is necessary we can send it directly to your pharmacy.  If lab work is needed we can place an order for that and you can then stop by our lab to have the test done at a later time.    Telephone visits are billed at different rates depending on your insurance coverage. During this emergency period, for some insurers they may be billed the same as an in-person visit.  Please reach out to your insurance provider with any questions.    If during the course of the call the physician/provider feels a telephone visit is not appropriate, you will not be charged for this service.\"    Patient has given verbal consent for Telephone visit?  Yes    What phone number would you like to be contacted at? [phone]    How would you like to obtain your AVS? MyChart    Phone call duration: 19 minutes    Zaynab Ruby CMA    I have reviewed and updated the patient's allergies and medication list. Patient was asked if they had any patient reported vital signs to present, if yes, please see documented vitals.  Patient was also asked for their current weight and height, if presented, documented in vitals.      Concerns: Patient states there are no new concerns to discuss with provider.  Dr Almaguer was not notified.      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 I reviewed his recent CT imaging which shows essentially resolution of necrosis. Double pigtail stents have been left in place across the cystgastrostomy. Clinically he is doing well although he does appear to have some symptoms of ongoing gastric outlet obstruction vs gastroparesis. CT also shows a distended stomach with food contents. His symptoms are not that severe however as he doesn't report that it limits his eating that much and his weight is stable. There is no remaining collection to be causing any significant mass effect for GOO. There may be some residual inflammation/scarring that is contributing which may continue to improve. We discussed a gastroparetic diet. No other specific intervention need for this at this time.    In terms of an etiology for his pancreatitis, he denies etoh use. No compelling evidence for biliary pancreatitis at the time of onset. Labs also normal at that time. Will obtain an MRI/MRCP in 3 months prior to follow up to complete work up. Biliary dilation noted on CT as well but LFTs previously were normal and he denies jaundice. Will recheck LFTs as well.    RECOMMENDATIONS:  - Repeat LFTs  - MRI/MRCP in 3 months and RTC      César Almaguer MD  [facility]  Division of Gastroenterology and Hepatology  [address] [address]    ________________________________________________________________  HPI:  Mateus is a 64 year old male with a history of diabetes on insulin who is here for follow up of necrotizing pancreatitis complicated by splenic/SMV thrombosis (4/2020) with a symptomatic walled-off necrosis s/p EUS drainage on 7/4 and subsequent necrosectomies (x3). Last necrosectomy was on 8/24/20  with placement of permanent double pigtail stents. He reports doing overall well. He still notes early satiety at times but stable weight. There were a couple of episodes where he felt nauseous. No fevers, chills, or abdominal pain. Denies jaundice.     PMHx:  Patient Active Problem List   Diagnosis     Necrotizing pancreatitis     Essential hypertension     Diabetes mellitus (H)       PSurgHx:  Past Surgical History:   Procedure Laterality Date     ENDOSCOPIC ULTRASOUND, ESOPHAGOSCOPY, GASTROSCOPY, DUODENOSCOPY (EGD), NECROSECTOMY N/A 7/4/2020    Procedure: Endoscopic ultrasound with cyst gastrostomy, dilation and stent placement, nasojejunal feeding tube placement;  Surgeon: César Almaguer MD;  Location: [facility] OR     ENDOSCOPIC ULTRASOUND, ESOPHAGOSCOPY, GASTROSCOPY, DUODENOSCOPY (EGD), NECROSECTOMY N/A 7/15/2020    Procedure: ESOPHAGOGASTRODUODENOSCOPY  WITH ENDOSCOPIC EXCISION OF NECROTIC PANCREATIC TISSUE, STENT EXCHANGE, NJ TUBE REMOVAL;  Surgeon: Segun Prakash MD;  Location: [facility] OR     ENDOSCOPIC ULTRASOUND, ESOPHAGOSCOPY, GASTROSCOPY, DUODENOSCOPY (EGD), NECROSECTOMY N/A 8/6/2020    Procedure: ESOPHAGOGASTRODUODENOSCOPY WITH NECROSECTOMY AND CYSTGASTROSTOMY STENT EXCHANGE;  Surgeon: César Almaguer MD;  Location: [facility] OR     ENDOSCOPIC ULTRASOUND, ESOPHAGOSCOPY, GASTROSCOPY, DUODENOSCOPY (EGD), NECROSECTOMY N/A 8/24/2020    Procedure: ESOPHAGOGASTRODUODENOSCOPY (EGD) with necrosectomy aND STENTS EXCHANGED, balloon dilation;  Surgeon: César Almaguer MD;  Location: [facility] OR     INSERT TUBE NASOJEJUNOSTOMY  7/4/2020    Procedure: Insert tube nasojejunostomy;  Surgeon: César Almaguer MD;  Location: [facility] OR       MEDS:  Current Outpatient Medications   Medication     amylase-lipase-protease (CREON 24) 52733-86530 units CPEP per EC capsule     aspirin (ASA) 81 MG chewable tablet     fish oil-omega-3 fatty acids 1000 MG capsule     insulin aspart (NOVOLOG PEN) 100 UNIT/ML pen     insulin glargine (LANTUS PEN) 100  UNIT/ML pen     lisinopril (ZESTRIL) 10 MG tablet     multivitamin w/minerals (THERA-VIT-M) tablet     omeprazole 20 MG tablet     ondansetron (ZOFRAN-ODT) 4 MG ODT tab     No current facility-administered medications for this visit.      ALLERGIES:  No Known Allergies      LABS:  Admission on 08/24/2020, Discharged on 08/24/2020   Component Date Value Ref Range Status     WBC 08/24/2020 7.2  4.0 - 11.0 10e9/L Final     RBC Count 08/24/2020 3.85* 4.4 - 5.9 10e12/L Final     Hemoglobin 08/24/2020 10.2* 13.3 - 17.7 g/dL Final     Hematocrit 08/24/2020 33.5* 40.0 - 53.0 % Final     MCV 08/24/2020 87  78 - 100 fl Final     MCH 08/24/2020 26.5  26.5 - 33.0 pg Final     MCHC 08/24/2020 30.4* 31.5 - 36.5 g/dL Final     RDW 08/24/2020 17.2* 10.0 - 15.0 % Final     Platelet Count 08/24/2020 299  150 - 450 10e9/L Final     INR 08/24/2020 1.26* 0.86 - 1.14 Final     Sodium 08/24/2020 136  133 - 144 mmol/L Final     Potassium 08/24/2020 3.9  3.4 - 5.3 mmol/L Final     Chloride 08/24/2020 103  94 - 109 mmol/L Final     Carbon Dioxide 08/24/2020 28  20 - 32 mmol/L Final     Anion Gap 08/24/2020 4  3 - 14 mmol/L Final     Glucose 08/24/2020 128* 70 - 99 mg/dL Final     Urea Nitrogen 08/24/2020 10  7 - 30 mg/dL Final     Creatinine 08/24/2020 0.70  0.66 - 1.25 mg/dL Final     GFR Estimate 08/24/2020 >90  >60 mL/min/[1.73_m2] Final    Comment: Non  GFR Calc  Starting 12/18/2018, serum creatinine based estimated GFR (eGFR) will be   calculated using the Chronic Kidney Disease Epidemiology Collaboration   (CKD-EPI) equation.       GFR Estimate If Black 08/24/2020 >90  >60 mL/min/[1.73_m2] Final    Comment:  GFR Calc  Starting 12/18/2018, serum creatinine based estimated GFR (eGFR) will be   calculated using the Chronic Kidney Disease Epidemiology Collaboration   (CKD-EPI) equation.       Calcium 08/24/2020 9.0  8.5 - 10.1 mg/dL Final     Bilirubin Total 08/24/2020 0.4  0.2 - 1.3 mg/dL Final     Albumin  08/24/2020 2.5* 3.4 - 5.0 g/dL Final     Protein Total 08/24/2020 8.2  6.8 - 8.8 g/dL Final     Alkaline Phosphatase 08/24/2020 73  40 - 150 U/L Final     ALT 08/24/2020 14  0 - 70 U/L Final     AST 08/24/2020 11  0 - 45 U/L Final     Glucose 08/24/2020 129* 70 - 99 mg/dL Final     Glucose 08/24/2020 116* 70 - 99 mg/dL Final     Upper GI Endoscopy 08/24/2020    Final                    Value:[address] [place] [address] [phone]     Endoscopy Department  _______________________________________________________________________________  Patient Name: Norberto Almonte          Procedure Date: 8/24/2020 7:41 AM  MRN: [id number]                       Account Number: [id number]  YOB: 1955             Admit Type: Outpatient  Age: 64                               Room: OR  Gender: Male                          Note Status: Finalized  Attending MD: César Almaguer MD       Pause for the Cause: time out performed  Total Sedation Time:                    _______________________________________________________________________________     Procedure:           Upper GI endoscopy  Indications:         65 yo M w/ h/o idiopathic necrotizing pancreatitis w/                        splenic vein & SMV thrombosis (4/2020), complicated by                        symptomatic sterile walled-off necrosis (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 S/P                        EUS-guided cystgastrostomy via 15-mm LAMS (7/4/20). S/P                        EGD w/ LAMS exchanged for DPPSs w/ index direct                        endoscopic necrosectomy (DEN; 7/15/20). S/P EGD w/                        cystgastrostomy plastic stent exchange w/ second DEN                        (8/6/20). Outpatient CT scan performed in interim                        (CD-ROM) showed improved WON size with WON extension                        into LUQ  requiring debridement. Today (8/24/20), patient                        presents for 3rd EGD w/ DEN.  Providers:           César Almaguer MD, Nickolas Doyle MD  Referring MD:        Nickolas Yin MD  Medicines:           General Anesthesia, Levaquin 500 mg PO  Complications:       No immediate complications.  _______________________________________________________________________________  Procedure:           Pre-Anesthesia Assessment:                       - Prio                          r to the procedure, a History and Physical was                        performed, and patient medications and allergies were                        reviewed. The patient is competent. The risks and                        benefits of the procedure and the sedation options and                        risks were discussed with the patient. All questions                        were answered and informed consent was obtained. Patient                        identification and proposed procedure were verified by                        the physician and the nurse in the pre-procedure area.                        Mental Status Examination: alert and oriented. Airway                        Examination: normal oropharyngeal airway and neck                        mobility. Respiratory Examination: clear to                        auscultation. CV Examination: normal. Prophylactic                        Antibiotics: The patient requires prophylactic                        antibiotics pancreatic necros                          ectomy. Prior                        Anticoagulants: The patient has taken no previous                        anticoagulant or antiplatelet agents. ASA Grade                        Assessment: III - A patient with severe systemic                        disease. After reviewing the risks and benefits, the                        patient was deemed in satisfactory condition to undergo                        the procedure.  The anesthesia plan was to use general                        anesthesia. Immediately prior to administration of                        medications, the patient was re-assessed for adequacy to                        receive sedatives. The heart rate, respiratory rate,                        oxygen saturations, blood pressure, adequacy of                        pulmonary ventilation, and response to care were                        monitored throughout the procedure. The physical status                        of the patient was re-assessed after the procedure.                                                 After obtaining informed consent, the endoscope was                        passed under direct vision. Throughout the procedure,                        the patient's blood pressure, pulse, and oxygen                        saturations were monitored continuously. The Endoscope                        was introduced through the mouth, and advanced to the                        second part of duodenum. The upper GI endoscopy was                        accomplished without difficulty. The patient tolerated                        the procedure well.                                                                                   Findings:        film: Three indwelling cystgastrostomy double pigtail plastic        stents (DPPSs).       Esophagus: No gross lesions were noted in the entire esophagus.       Stomach: Gastric lumen filled with a significant amount of solid food        and retained secretions. Two of the cystgastrostomy DPPSs were extracted        using a rat-toot                          h forceps; one cystgastrostomy LAMS was left in-place in        order to localize the cystgastrostomy amid the food. The cystgastrostomy        tract was dilated to 15-mm using an Elation balloon catheter. The        remaining DPPS was extracted using a rat-tooth forceps. The        cystgastrostomy tract was intubated  using the gastroscope, and we used        fluoroscopy to focus our debridement efforts on the LUQ. We meticulously        debrided solid necrosis from the WON using a snare. PUS drained from the        WON cavity intermittently. Under endoscopic and fluoroscopic guidance,        we deployed a cystgastrostomy 10-Fr x 7-cm Solus double pigtail plastic        stent (DPPS) and a cystgastrostomy 10-Fr x 3-cm Solus DPPS.       Duodenum: Proximal duodenal wall edema.                                                                                   Impression:          - EGD: Edematous proximal duodenum (from pancreatitis)                        with large amount of food and secr                          etions in stomach,                        suggestive of functional gastric outlet obstruction.                       - Extraction of 3 indwelling cystgastrostomy DPPSs                        (placed 8/6/20).                       - Dilation of cystgastrostomy tract via 15-mm Elation                        balloon.                       - Direct endoscopic necrosectomy using snare. Extraction                        of solid debris and PUS. Most of the cavity cleared                        although food in the stomach precluded optimal                        access/debridement                       - Deployment of cystgastrostomy 10-Fr x 7-cm Solus DPPS                        into WON.                       - Deployment of cystgastrostomy 10-Fr x 3-cm Solus DPPS                        into WON.                       -MODIFIER 22 given complexity of procedure  Recommendation:      - Discharge home                       - Repeat CT AP with IV contrast in 2-3-weeks to reassess.                                                 - Schedule virtual clinic appointment with Dr. Almaguer                        shortly after completion of outpatient CT scan, to                        discuss plan of care. Potentially no further endsocopic                         procedures needed                                                                                     César Almaguer MD  ________________  César Almaguer MD  8/25/2020 3:31:08 PM  I was physically present for the entire viewing portion of the exam.  __________________________  Signature of teaching physician  Jeannie/Malu Almaguer MD    __________________  Nickolas Doyle MD  Number of Addenda: 0    Note Initiated On: 8/24/2020 7:41 AM  Scope In:  Scope Out:

## 2020-09-16 NOTE — PROGRESS NOTES
Care Coordination Telephone Call  [facility]     Called patient to discuss plan for follow up post virtual visit with Dr. Almaguer today.      Patient will contact me if needs LFT order to be done at upcoming visit with PCP that is already scheduled.  Asked patient to call me when this is completed.    Orders for MRI to be done in 3 months placed and faxed [facility]     Fax number [phone]     I have asked the patient to call with any additional questions or concerns and have provided my contact information.    Plan:  MRI in 3 months; virtual visit with Tripp to follow when we have received the images    Libra GANN, HNBC, STAR-T  RN Care Coordinator  [facility]  Ph: [phone]  FAX: [phone]

## 2020-09-16 NOTE — LETTER
9/16/2020       RE: Norberto Almonte  [address]  [facility] [address]     Dear Colleague,    Thank you for referring your patient, Norberto Almonte, to the [facility]. Please see a copy of my visit note below.    I have reviewed and updated the patient's allergies and medication list. Patient was asked if they had any patient reported vital signs to present, if yes, please see documented vitals.  Patient was also asked for their current weight and height, if presented, documented in vitals.    Concerns: Patient states there are no new concerns to discuss with provider.  Dr Almaguer was not notified.    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 I reviewed his recent CT imaging which shows essentially resolution of necrosis. Double pigtail stents have been left in place across the cystgastrostomy. Clinically he is doing well although he does appear to have some symptoms of ongoing gastric outlet obstruction vs gastroparesis. CT also shows a distended stomach with food contents. His symptoms are not that severe however as he doesn't report that it limits his eating that much and his weight is stable. There is no remaining collection to be causing any significant mass effect for GOO. There may be some residual inflammation/scarring that is contributing which may continue to improve. We discussed a gastroparetic diet. No other specific intervention need for this at this time.    In  terms of an etiology for his pancreatitis, he denies etoh use. No compelling evidence for biliary pancreatitis at the time of onset. Labs also normal at that time. Will obtain an MRI/MRCP in 3 months prior to follow up to complete work up. Biliary dilation noted on CT as well but LFTs previously were normal and he denies jaundice. Will recheck LFTs as well.    RECOMMENDATIONS:  - Repeat LFTs  - MRI/MRCP in 3 months and RTC    César Almaguer MD  [facility]  Division of Gastroenterology and Hepatology  [place] [address]  ________________________________________________________________  HPI:  Mateus is a 64 year old male with a history of diabetes on insulin who is here for follow up of necrotizing pancreatitis complicated by splenic/SMV thrombosis (4/2020) with a symptomatic walled-off necrosis s/p EUS drainage on 7/4 and subsequent necrosectomies (x3). Last necrosectomy was on 8/24/20 with placement of permanent double pigtail stents. He reports doing overall well. He still notes early satiety at times but stable weight. There were a couple of episodes where he felt nauseous. No fevers, chills, or abdominal pain. Denies jaundice.     PMHx:  Patient Active Problem List   Diagnosis     Necrotizing pancreatitis     Essential hypertension     Diabetes mellitus (H)     PSurgHx:  Past Surgical History:   Procedure Laterality Date     ENDOSCOPIC ULTRASOUND, ESOPHAGOSCOPY, GASTROSCOPY, DUODENOSCOPY (EGD), NECROSECTOMY N/A 7/4/2020    Procedure: Endoscopic ultrasound with cyst gastrostomy, dilation and stent placement, nasojejunal feeding tube placement;  Surgeon: César Almaguer MD;  Location:  OR     ENDOSCOPIC ULTRASOUND, ESOPHAGOSCOPY, GASTROSCOPY, DUODENOSCOPY (EGD), NECROSECTOMY N/A 7/15/2020    Procedure: ESOPHAGOGASTRODUODENOSCOPY  WITH ENDOSCOPIC EXCISION OF NECROTIC PANCREATIC TISSUE, STENT EXCHANGE, NJ TUBE REMOVAL;  Surgeon: Segun Prakash  MD Eran;  Location: [facility] OR     ENDOSCOPIC ULTRASOUND, ESOPHAGOSCOPY, GASTROSCOPY, DUODENOSCOPY (EGD), NECROSECTOMY N/A 8/6/2020    Procedure: ESOPHAGOGASTRODUODENOSCOPY WITH NECROSECTOMY AND CYSTGASTROSTOMY STENT EXCHANGE;  Surgeon: César Almaguer MD;  Location: [facility] OR     ENDOSCOPIC ULTRASOUND, ESOPHAGOSCOPY, GASTROSCOPY, DUODENOSCOPY (EGD), NECROSECTOMY N/A 8/24/2020    Procedure: ESOPHAGOGASTRODUODENOSCOPY (EGD) with necrosectomy aND STENTS EXCHANGED, balloon dilation;  Surgeon: César Almaguer MD;  Location: [facility] OR     INSERT TUBE NASOJEJUNOSTOMY  7/4/2020    Procedure: Insert tube nasojejunostomy;  Surgeon: César Almaguer MD;  Location: [facility] OR     MEDS:  Current Outpatient Medications   Medication     amylase-lipase-protease (CREON 24) 52868-22237 units CPEP per EC capsule     aspirin (ASA) 81 MG chewable tablet     fish oil-omega-3 fatty acids 1000 MG capsule     insulin aspart (NOVOLOG PEN) 100 UNIT/ML pen     insulin glargine (LANTUS PEN) 100 UNIT/ML pen     lisinopril (ZESTRIL) 10 MG tablet     multivitamin w/minerals (THERA-VIT-M) tablet     omeprazole 20 MG tablet     ondansetron (ZOFRAN-ODT) 4 MG ODT tab     No current facility-administered medications for this visit.      ALLERGIES:  No Known Allergies    LABS:  Admission on 08/24/2020, Discharged on 08/24/2020   Component Date Value Ref Range Status     WBC 08/24/2020 7.2  4.0 - 11.0 10e9/L Final     RBC Count 08/24/2020 3.85* 4.4 - 5.9 10e12/L Final     Hemoglobin 08/24/2020 10.2* 13.3 - 17.7 g/dL Final     Hematocrit 08/24/2020 33.5* 40.0 - 53.0 % Final     MCV 08/24/2020 87  78 - 100 fl Final     MCH 08/24/2020 26.5  26.5 - 33.0 pg Final     MCHC 08/24/2020 30.4* 31.5 - 36.5 g/dL Final     RDW 08/24/2020 17.2* 10.0 - 15.0 % Final     Platelet Count 08/24/2020 299  150 - 450 10e9/L Final     INR 08/24/2020 1.26* 0.86 - 1.14 Final     Sodium 08/24/2020 136  133 - 144 mmol/L Final     Potassium 08/24/2020 3.9  3.4 - 5.3 mmol/L Final     Chloride  08/24/2020 103  94 - 109 mmol/L Final     Carbon Dioxide 08/24/2020 28  20 - 32 mmol/L Final     Anion Gap 08/24/2020 4  3 - 14 mmol/L Final     Glucose 08/24/2020 128* 70 - 99 mg/dL Final     Urea Nitrogen 08/24/2020 10  7 - 30 mg/dL Final     Creatinine 08/24/2020 0.70  0.66 - 1.25 mg/dL Final     GFR Estimate 08/24/2020 >90  >60 mL/min/[1.73_m2] Final    Comment: Non  GFR Calc  Starting 12/18/2018, serum creatinine based estimated GFR (eGFR) will be   calculated using the Chronic Kidney Disease Epidemiology Collaboration   (CKD-EPI) equation.       GFR Estimate If Black 08/24/2020 >90  >60 mL/min/[1.73_m2] Final    Comment:  GFR Calc  Starting 12/18/2018, serum creatinine based estimated GFR (eGFR) will be   calculated using the Chronic Kidney Disease Epidemiology Collaboration   (CKD-EPI) equation.       Calcium 08/24/2020 9.0  8.5 - 10.1 mg/dL Final     Bilirubin Total 08/24/2020 0.4  0.2 - 1.3 mg/dL Final     Albumin 08/24/2020 2.5* 3.4 - 5.0 g/dL Final     Protein Total 08/24/2020 8.2  6.8 - 8.8 g/dL Final     Alkaline Phosphatase 08/24/2020 73  40 - 150 U/L Final     ALT 08/24/2020 14  0 - 70 U/L Final     AST 08/24/2020 11  0 - 45 U/L Final     Glucose 08/24/2020 129* 70 - 99 mg/dL Final     Glucose 08/24/2020 116* 70 - 99 mg/dL Final     Upper GI Endoscopy 08/24/2020    Final                    Value:[address] [place] [address] [phone]     Endoscopy Department  _______________________________________________________________________________  Patient Name: Norberto Lovemaura          Procedure Date: 8/24/2020 7:41 AM  MRN: [id number]                       Account Number: [id number]  YOB: 1955             Admit Type: Outpatient  Age: 64                               Room: OR  Gender: Male                          Note Status: Finalized  Attending MD: César Almaguer MD       Pause for the Cause: time out performed  Total  Sedation Time:                    _______________________________________________________________________________     Procedure:           Upper GI endoscopy  Indications:         65 yo M w/ h/o idiopathic necrotizing pancreatitis w/                        splenic vein & SMV thrombosis (4/2020), complicated by                        symptomatic sterile walled-off necrosis (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 S/P                        EUS-guided cystgastrostomy via 15-mm LAMS (7/4/20). S/P                        EGD w/ LAMS exchanged for DPPSs w/ index direct                        endoscopic necrosectomy (DEN; 7/15/20). S/P EGD w/                        cystgastrostomy plastic stent exchange w/ second DEN                        (8/6/20). Outpatient CT scan performed in interim                        (CD-ROM) showed improved WON size with WON extension                        into LUQ requiring debridement. Today (8/24/20), patient                        presents for 3rd EGD w/ DEN.  Providers:           César Almaguer MD, Nickolas Doyle MD  Referring MD:        Nickolas Yin MD  Medicines:           General Anesthesia, Levaquin 500 mg PO  Complications:       No immediate complications.  _______________________________________________________________________________  Procedure:           Pre-Anesthesia Assessment:                       - Prio                          r to the procedure, a History and Physical was                        performed, and patient medications and allergies were                        reviewed. The patient is competent. The risks and                        benefits of the procedure and the sedation options and                        risks were discussed with the patient. All questions                        were answered and informed consent was obtained. Patient                        identification and proposed  procedure were verified by                        the physician and the nurse in the pre-procedure area.                        Mental Status Examination: alert and oriented. Airway                        Examination: normal oropharyngeal airway and neck                        mobility. Respiratory Examination: clear to                        auscultation. CV Examination: normal. Prophylactic                        Antibiotics: The patient requires prophylactic                        antibiotics pancreatic necros                          ectomy. Prior                        Anticoagulants: The patient has taken no previous                        anticoagulant or antiplatelet agents. ASA Grade                        Assessment: III - A patient with severe systemic                        disease. After reviewing the risks and benefits, the                        patient was deemed in satisfactory condition to undergo                        the procedure. The anesthesia plan was to use general                        anesthesia. Immediately prior to administration of                        medications, the patient was re-assessed for adequacy to                        receive sedatives. The heart rate, respiratory rate,                        oxygen saturations, blood pressure, adequacy of                        pulmonary ventilation, and response to care were                        monitored throughout the procedure. The physical status                        of the patient was re-assessed after the procedure.                                                 After obtaining informed consent, the endoscope was                        passed under direct vision. Throughout the procedure,                        the patient's blood pressure, pulse, and oxygen                        saturations were monitored continuously. The Endoscope                        was introduced through the mouth, and advanced to the                         second part of duodenum. The upper GI endoscopy was                        accomplished without difficulty. The patient tolerated                        the procedure well.                                                                                   Findings:        film: Three indwelling cystgastrostomy double pigtail plastic        stents (DPPSs).       Esophagus: No gross lesions were noted in the entire esophagus.       Stomach: Gastric lumen filled with a significant amount of solid food        and retained secretions. Two of the cystgastrostomy DPPSs were extracted        using a rat-toot                          h forceps; one cystgastrostomy LAMS was left in-place in        order to localize the cystgastrostomy amid the food. The cystgastrostomy        tract was dilated to 15-mm using an Elation balloon catheter. The        remaining DPPS was extracted using a rat-tooth forceps. The        cystgastrostomy tract was intubated using the gastroscope, and we used        fluoroscopy to focus our debridement efforts on the LUQ. We meticulously        debrided solid necrosis from the WON using a snare. PUS drained from the        WON cavity intermittently. Under endoscopic and fluoroscopic guidance,        we deployed a cystgastrostomy 10-Fr x 7-cm Solus double pigtail plastic        stent (DPPS) and a cystgastrostomy 10-Fr x 3-cm Solus DPPS.       Duodenum: Proximal duodenal wall edema.                                                                                   Impression:          - EGD: Edematous proximal duodenum (from pancreatitis)                        with large amount of food and secr                          etions in stomach,                        suggestive of functional gastric outlet obstruction.                       - Extraction of 3 indwelling cystgastrostomy DPPSs                        (placed 8/6/20).                       - Dilation of cystgastrostomy tract via 15-mm  Elation                        balloon.                       - Direct endoscopic necrosectomy using snare. Extraction                        of solid debris and PUS. Most of the cavity cleared                        although food in the stomach precluded optimal                        access/debridement                       - Deployment of cystgastrostomy 10-Fr x 7-cm Solus DPPS                        into WON.                       - Deployment of cystgastrostomy 10-Fr x 3-cm Solus DPPS                        into WON.                       -MODIFIER 22 given complexity of procedure  Recommendation:      - Discharge home                       - Repeat CT AP with IV contrast in 2-3-weeks to reassess.                                                 - Schedule virtual clinic appointment with Dr. Almaguer                        shortly after completion of outpatient CT scan, to                        discuss plan of care. Potentially no further endsocopic                        procedures needed                                                                                     César Almaguer MD  ________________  César Almaguer MD  8/25/2020 3:31:08 PM  I was physically present for the entire viewing portion of the exam.  __________________________  Signature of teaching physician  Jeannie/Malu Almaguer MD    __________________  Nickolas Doyle MD  Number of Addenda: 0    Note Initiated On: 8/24/2020 7:41 AM  Scope In:  Scope Out:       Again, thank you for allowing me to participate in the care of your patient.  Sincerely,    César Almaguer MD

## 2020-09-16 NOTE — LETTER
"    9/16/2020         RE: Norberto Almonte  [address]  [facility] [address]        Dear Colleague,    Thank you for referring your patient, Norberto Almonte, to the [facility]. Please see a copy of my visit note below.    Norberto Almonte is a 64 year old male who is being evaluated via a billable telephone visit.      The patient has been notified of following:     \"This telephone visit will be conducted via a call between you and your physician/provider. We have found that certain health care needs can be provided without the need for a physical exam.  This service lets us provide the care you need with a short phone conversation.  If a prescription is necessary we can send it directly to your pharmacy.  If lab work is needed we can place an order for that and you can then stop by our lab to have the test done at a later time.    Telephone visits are billed at different rates depending on your insurance coverage. During this emergency period, for some insurers they may be billed the same as an in-person visit.  Please reach out to your insurance provider with any questions.    If during the course of the call the physician/provider feels a telephone visit is not appropriate, you will not be charged for this service.\"    Patient has given verbal consent for Telephone visit?  Yes    What phone number would you like to be contacted at? [phone]    How would you like to obtain your AVS? [facility]    Phone call duration: 19 minutes    Zaynab Ruby CMA    I have reviewed and updated the patient's allergies and medication list. Patient was asked if they had any patient reported vital signs to present, if yes, please see documented vitals.  Patient was also asked for their current weight and height, if presented, documented in vitals.      Concerns: Patient states there are no new concerns to discuss with provider.  Dr Almaguer was not notified.      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 I reviewed his recent CT imaging which shows essentially resolution of necrosis. Double pigtail stents have been left in place across the cystgastrostomy. Clinically he is doing well although he does appear to have some symptoms of ongoing gastric outlet obstruction vs gastroparesis. CT also shows a distended stomach with food contents. His symptoms are not that severe however as he doesn't report that it limits his eating that much and his weight is stable. There is no remaining collection to be causing any significant mass effect for GOO. There may be some residual inflammation/scarring that is contributing which may continue to improve. We discussed a gastroparetic diet. No other specific intervention need for this at this time.    In terms of an etiology for his pancreatitis, he denies etoh use. No compelling evidence for biliary pancreatitis at the time of onset. Labs also normal at that time. Will obtain an MRI/MRCP in 3 months prior to follow up to complete work up. Biliary dilation noted on CT as well but LFTs previously were normal and he denies jaundice. Will recheck LFTs as well.    RECOMMENDATIONS:  - Repeat LFTs  - MRI/MRCP in 3 months and RTC      César Almaguer MD  [facility]  Division of Gastroenterology and Hepatology  [address] [address]    ________________________________________________________________  HPI:  Mateus is a 64 year old male with a history  of diabetes on insulin who is here for follow up of necrotizing pancreatitis complicated by splenic/SMV thrombosis (4/2020) with a symptomatic walled-off necrosis s/p EUS drainage on 7/4 and subsequent necrosectomies (x3). Last necrosectomy was on 8/24/20 with placement of permanent double pigtail stents. He reports doing overall well. He still notes early satiety at times but stable weight. There were a couple of episodes where he felt nauseous. No fevers, chills, or abdominal pain. Denies jaundice.     PMHx:  Patient Active Problem List   Diagnosis     Necrotizing pancreatitis     Essential hypertension     Diabetes mellitus (H)       PSurgHx:  Past Surgical History:   Procedure Laterality Date     ENDOSCOPIC ULTRASOUND, ESOPHAGOSCOPY, GASTROSCOPY, DUODENOSCOPY (EGD), NECROSECTOMY N/A 7/4/2020    Procedure: Endoscopic ultrasound with cyst gastrostomy, dilation and stent placement, nasojejunal feeding tube placement;  Surgeon: César Almaguer MD;  Location: [facility] OR     ENDOSCOPIC ULTRASOUND, ESOPHAGOSCOPY, GASTROSCOPY, DUODENOSCOPY (EGD), NECROSECTOMY N/A 7/15/2020    Procedure: ESOPHAGOGASTRODUODENOSCOPY  WITH ENDOSCOPIC EXCISION OF NECROTIC PANCREATIC TISSUE, STENT EXCHANGE, NJ TUBE REMOVAL;  Surgeon: Segun Prakash MD;  Location: [facility] OR     ENDOSCOPIC ULTRASOUND, ESOPHAGOSCOPY, GASTROSCOPY, DUODENOSCOPY (EGD), NECROSECTOMY N/A 8/6/2020    Procedure: ESOPHAGOGASTRODUODENOSCOPY WITH NECROSECTOMY AND CYSTGASTROSTOMY STENT EXCHANGE;  Surgeon: César Almaguer MD;  Location: [facility] OR     ENDOSCOPIC ULTRASOUND, ESOPHAGOSCOPY, GASTROSCOPY, DUODENOSCOPY (EGD), NECROSECTOMY N/A 8/24/2020    Procedure: ESOPHAGOGASTRODUODENOSCOPY (EGD) with necrosectomy aND STENTS EXCHANGED, balloon dilation;  Surgeon: César Almaguer MD;  Location: [facility] OR     INSERT TUBE NASOJEJUNOSTOMY  7/4/2020    Procedure: Insert tube nasojejunostomy;  Surgeon: César Almaguer MD;  Location: [facility] OR       MEDS:  Current Outpatient Medications   Medication      amylase-lipase-protease (CREON 24) 28525-36567 units CPEP per EC capsule     aspirin (ASA) 81 MG chewable tablet     fish oil-omega-3 fatty acids 1000 MG capsule     insulin aspart (NOVOLOG PEN) 100 UNIT/ML pen     insulin glargine (LANTUS PEN) 100 UNIT/ML pen     lisinopril (ZESTRIL) 10 MG tablet     multivitamin w/minerals (THERA-VIT-M) tablet     omeprazole 20 MG tablet     ondansetron (ZOFRAN-ODT) 4 MG ODT tab     No current facility-administered medications for this visit.      ALLERGIES:  No Known Allergies      LABS:  Admission on 08/24/2020, Discharged on 08/24/2020   Component Date Value Ref Range Status     WBC 08/24/2020 7.2  4.0 - 11.0 10e9/L Final     RBC Count 08/24/2020 3.85* 4.4 - 5.9 10e12/L Final     Hemoglobin 08/24/2020 10.2* 13.3 - 17.7 g/dL Final     Hematocrit 08/24/2020 33.5* 40.0 - 53.0 % Final     MCV 08/24/2020 87  78 - 100 fl Final     MCH 08/24/2020 26.5  26.5 - 33.0 pg Final     MCHC 08/24/2020 30.4* 31.5 - 36.5 g/dL Final     RDW 08/24/2020 17.2* 10.0 - 15.0 % Final     Platelet Count 08/24/2020 299  150 - 450 10e9/L Final     INR 08/24/2020 1.26* 0.86 - 1.14 Final     Sodium 08/24/2020 136  133 - 144 mmol/L Final     Potassium 08/24/2020 3.9  3.4 - 5.3 mmol/L Final     Chloride 08/24/2020 103  94 - 109 mmol/L Final     Carbon Dioxide 08/24/2020 28  20 - 32 mmol/L Final     Anion Gap 08/24/2020 4  3 - 14 mmol/L Final     Glucose 08/24/2020 128* 70 - 99 mg/dL Final     Urea Nitrogen 08/24/2020 10  7 - 30 mg/dL Final     Creatinine 08/24/2020 0.70  0.66 - 1.25 mg/dL Final     GFR Estimate 08/24/2020 >90  >60 mL/min/[1.73_m2] Final    Comment: Non  GFR Calc  Starting 12/18/2018, serum creatinine based estimated GFR (eGFR) will be   calculated using the Chronic Kidney Disease Epidemiology Collaboration   (CKD-EPI) equation.       GFR Estimate If Black 08/24/2020 >90  >60 mL/min/[1.73_m2] Final    Comment:  GFR Calc  Starting 12/18/2018, serum creatinine  based estimated GFR (eGFR) will be   calculated using the Chronic Kidney Disease Epidemiology Collaboration   (CKD-EPI) equation.       Calcium 08/24/2020 9.0  8.5 - 10.1 mg/dL Final     Bilirubin Total 08/24/2020 0.4  0.2 - 1.3 mg/dL Final     Albumin 08/24/2020 2.5* 3.4 - 5.0 g/dL Final     Protein Total 08/24/2020 8.2  6.8 - 8.8 g/dL Final     Alkaline Phosphatase 08/24/2020 73  40 - 150 U/L Final     ALT 08/24/2020 14  0 - 70 U/L Final     AST 08/24/2020 11  0 - 45 U/L Final     Glucose 08/24/2020 129* 70 - 99 mg/dL Final     Glucose 08/24/2020 116* 70 - 99 mg/dL Final     Upper GI Endoscopy 08/24/2020    Final                    Value:[address] [place] [address] [phone]     Endoscopy Department  _______________________________________________________________________________  Patient Name: Norberto Almonte          Procedure Date: 8/24/2020 7:41 AM  MRN: [id number]                       Account Number: [id number]  YOB: 1955             Admit Type: Outpatient  Age: 64                               Room: OR  Gender: Male                          Note Status: Finalized  Attending MD: César Almaguer MD       Pause for the Cause: time out performed  Total Sedation Time:                    _______________________________________________________________________________     Procedure:           Upper GI endoscopy  Indications:         63 yo M w/ h/o idiopathic necrotizing pancreatitis w/                        splenic vein & SMV thrombosis (4/2020), complicated by                        symptomatic sterile walled-off necrosis (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 S/P                        EUS-guided cystgastrostomy via 15-mm LAMS (7/4/20). S/P                        EGD w/ LAMS exchanged for DPPSs w/ index direct                        endoscopic necrosectomy (DEN; 7/15/20). S/P EGD w/                         cystgastrostomy plastic stent exchange w/ second DEN                        (8/6/20). Outpatient CT scan performed in interim                        (CD-ROM) showed improved WON size with WON extension                        into LUQ requiring debridement. Today (8/24/20), patient                        presents for 3rd EGD w/ DEN.  Providers:           César Almaguer MD, Nickolas Doyle MD  Referring MD:        Nickolas Yin MD  Medicines:           General Anesthesia, Levaquin 500 mg PO  Complications:       No immediate complications.  _______________________________________________________________________________  Procedure:           Pre-Anesthesia Assessment:                       - Prio                          r to the procedure, a History and Physical was                        performed, and patient medications and allergies were                        reviewed. The patient is competent. The risks and                        benefits of the procedure and the sedation options and                        risks were discussed with the patient. All questions                        were answered and informed consent was obtained. Patient                        identification and proposed procedure were verified by                        the physician and the nurse in the pre-procedure area.                        Mental Status Examination: alert and oriented. Airway                        Examination: normal oropharyngeal airway and neck                        mobility. Respiratory Examination: clear to                        auscultation. CV Examination: normal. Prophylactic                        Antibiotics: The patient requires prophylactic                        antibiotics pancreatic necros                          ectomy. Prior                        Anticoagulants: The patient has taken no previous                        anticoagulant or antiplatelet agents. ASA Grade                         Assessment: III - A patient with severe systemic                        disease. After reviewing the risks and benefits, the                        patient was deemed in satisfactory condition to undergo                        the procedure. The anesthesia plan was to use general                        anesthesia. Immediately prior to administration of                        medications, the patient was re-assessed for adequacy to                        receive sedatives. The heart rate, respiratory rate,                        oxygen saturations, blood pressure, adequacy of                        pulmonary ventilation, and response to care were                        monitored throughout the procedure. The physical status                        of the patient was re-assessed after the procedure.                                                 After obtaining informed consent, the endoscope was                        passed under direct vision. Throughout the procedure,                        the patient's blood pressure, pulse, and oxygen                        saturations were monitored continuously. The Endoscope                        was introduced through the mouth, and advanced to the                        second part of duodenum. The upper GI endoscopy was                        accomplished without difficulty. The patient tolerated                        the procedure well.                                                                                   Findings:        film: Three indwelling cystgastrostomy double pigtail plastic        stents (DPPSs).       Esophagus: No gross lesions were noted in the entire esophagus.       Stomach: Gastric lumen filled with a significant amount of solid food        and retained secretions. Two of the cystgastrostomy DPPSs were extracted        using a rat-toot                          h forceps; one cystgastrostomy LAMS was left in-place in        order  to localize the cystgastrostomy amid the food. The cystgastrostomy        tract was dilated to 15-mm using an Elation balloon catheter. The        remaining DPPS was extracted using a rat-tooth forceps. The        cystgastrostomy tract was intubated using the gastroscope, and we used        fluoroscopy to focus our debridement efforts on the LUQ. We meticulously        debrided solid necrosis from the WON using a snare. PUS drained from the        WON cavity intermittently. Under endoscopic and fluoroscopic guidance,        we deployed a cystgastrostomy 10-Fr x 7-cm Solus double pigtail plastic        stent (DPPS) and a cystgastrostomy 10-Fr x 3-cm Solus DPPS.       Duodenum: Proximal duodenal wall edema.                                                                                   Impression:          - EGD: Edematous proximal duodenum (from pancreatitis)                        with large amount of food and secr                          etions in stomach,                        suggestive of functional gastric outlet obstruction.                       - Extraction of 3 indwelling cystgastrostomy DPPSs                        (placed 8/6/20).                       - Dilation of cystgastrostomy tract via 15-mm Elation                        balloon.                       - Direct endoscopic necrosectomy using snare. Extraction                        of solid debris and PUS. Most of the cavity cleared                        although food in the stomach precluded optimal                        access/debridement                       - Deployment of cystgastrostomy 10-Fr x 7-cm Solus DPPS                        into WON.                       - Deployment of cystgastrostomy 10-Fr x 3-cm Solus DPPS                        into WON.                       -MODIFIER 22 given complexity of procedure  Recommendation:      - Discharge home                       - Repeat CT AP with IV contrast in 2-3-weeks to reassess.                                                  - Schedule virtual clinic appointment with Dr. Almaguer                        shortly after completion of outpatient CT scan, to                        discuss plan of care. Potentially no further endsocopic                        procedures needed                                                                                     César Almaguer MD  ________________  César Almaguer MD  8/25/2020 3:31:08 PM  I was physically present for the entire viewing portion of the exam.  __________________________  Signature of teaching physician  Jeannie/Malu Almaguer MD    __________________  Nickolas Doyle MD  Number of Addenda: 0    Note Initiated On: 8/24/2020 7:41 AM  Scope In:  Scope Out:         Again, thank you for allowing me to participate in the care of your patient.        Sincerely,        César Almaguer MD

## 2020-09-23 PROBLEM — E11.9 DIABETES MELLITUS (H): Status: ACTIVE | Noted: 2020-09-23

## 2020-12-15 ENCOUNTER — PATIENT OUTREACH (OUTPATIENT)
Dept: GASTROENTEROLOGY | Facility: CLINIC | Age: 65
End: 2020-12-15

## 2020-12-15 NOTE — PROGRESS NOTES
"Care Coordination Telephone Call  [facility]     Called patient to discuss when MRI is scheduled and he states that MRI coverage has been denied as labs are normal and he is feeling very well.  Denies pain but has some numbness in his legs that he thinks is related to his sciatic nerve but that is getting better.  He relates \"eating everything\" denies abdominal pain.     [facility].    12/21/20 called Mateus and he has been in contact with [facility] and the [facility] will work on extending the referral to the [facility].  We are to fax the order for [facility] to [phone].  They will review.      I have asked the patient to call with any additional questions or concerns and have provided my contact information.    Plan:  [facility] for [facility] of idiopathic pancreatitis to attempt to determine reason now that he is feeling well.  Order to be faxed.     Libra GANN, HNBC, STAR-NATALIE  RN Care Coordinator  [facility]  Ph: [phone]  FAX: [phone]          "

## 2021-01-04 ENCOUNTER — HEALTH MAINTENANCE LETTER (OUTPATIENT)
Age: 66
End: 2021-01-04

## 2021-01-05 ENCOUNTER — PATIENT OUTREACH (OUTPATIENT)
Dept: GASTROENTEROLOGY | Facility: CLINIC | Age: 66
End: 2021-01-05

## 2021-01-05 NOTE — PROGRESS NOTES
"Care Coordination Telephone Call  [facility]     Called patient to see if he was able to arrange [facility] through the [facility].  He states that it is a \"work in progress\" and they are working on scheduling the MRI locally as he does not want to travel to [place] to do the MRI due covid.    I have asked the patient to call with any additional questions or concerns and have provided my contact information.    Plan:  I have asked him to call me with plan after he talks with [facility]    Libra GANN, HNBC, STAR-T  RN Care Coordinator  [facility]  Ph: [phone]  FAX: [phone]          "

## 2021-01-06 ENCOUNTER — VIRTUAL VISIT (OUTPATIENT)
Dept: GASTROENTEROLOGY | Facility: CLINIC | Age: 66
End: 2021-01-06
Attending: INTERNAL MEDICINE
Payer: MEDICARE

## 2021-01-06 DIAGNOSIS — K85.91 NECROTIZING PANCREATITIS: Primary | ICD-10-CM

## 2021-01-06 DIAGNOSIS — E08.9 DIABETES MELLITUS DUE TO UNDERLYING CONDITION WITHOUT COMPLICATION, WITHOUT LONG-TERM CURRENT USE OF INSULIN (H): ICD-10-CM

## 2021-01-06 PROCEDURE — 999N001193 HC VIDEO/TELEPHONE VISIT; NO CHARGE

## 2021-01-06 PROCEDURE — 99442 PR PHYSICIAN TELEPHONE EVALUATION 11-20 MIN: CPT | Mod: 95 | Performed by: INTERNAL MEDICINE

## 2021-01-06 NOTE — LETTER
"1/6/2021      RE: Norberto Almonte  [address]  [facility] [address]       Norberto Almonte is a 65 year old male who is being evaluated via a billable telephone visit.      What phone number would you like to be contacted at? [phone]  How would you like to obtain your AVS? Mail a copy     Vitals - Patient Reported  Weight (Patient Reported): 83 kg (183 lb)  Height (Patient Reported): 175 cm (5' 8.9\")  BMI (Based on Pt Reported Ht/Wt): 27.1  Pain Score: No Pain (0)    Bre Graham [facility]      Assessment & Plan     Necrotizing pancreatitis  H/o idiopathic necrotizing pancreatitis (4/2020) c/b symptomatic walled-off necrosis s/p EUS drainage (7/4/20) and subsequent endoscopic necrosectomy with complete clearance. Had gastric outlet obstruction symptoms that have since resolved. Clinically, has essentially recovered.    Only remaining issue is the etiology. No ETOH history. No evidence for gallstone induced pancreatitis. Normal other labs/work up. We had requested an MRI/MRCP to help rule out mass lesion or structural causes for his pancreatitis. This was denied by his commercial insurance. [facility] is [facility]. I did explain that in a small percentage of patients we aren't able to explain the cause (idiopathic) and while relatively low yield I do recommend getting an MRI/MRCP to rule out a reversible cause. If normal, no other work up needed and he can follow locally.       10 minutes spent on the date of the encounter doing chart review          Tobacco Cessation:   reports that he has been smoking cigars. He has quit using smokeless tobacco.      Follow up as needed.    César Almaguer MD  [facility]    Subjective     Norberto Almonte is a 65 year old who presents to clinic today for the following health issues     HPI   Mateus presents for telephone follow up visit. Clinically he reports doing very well since his last visit. He is able to eat normally " without limitations. He exercises daily. His weight has been stable. He denies abdominal pain, nausea/vomiting, fevers.     Review of Systems   CONSTITUTIONAL: NEGATIVE for fever, chills, change in weight  ENT/MOUTH: NEGATIVE for ear, mouth and throat problems  RESP: NEGATIVE for significant cough or SOB  CV: NEGATIVE for chest pain, palpitations or peripheral edema  ROS otherwise negative      Objective           Vitals:  No vitals were obtained today due to virtual visit.    Physical Exam   PSYCH: Alert and oriented times 3; coherent speech, normal   rate and volume, able to articulate logical thoughts, able   to abstract reason, no tangential thoughts, no hallucinations   or delusions    RESP: No cough, no audible wheezing, able to talk in full sentences  Remainder of exam unable to be completed due to telephone visits      Phone call duration: 20 minutes        César Almaguer MD

## 2021-01-06 NOTE — PROGRESS NOTES
"Norberto Almonte is a 65 year old male who is being evaluated via a billable telephone visit.      What phone number would you like to be contacted at? [phone]  How would you like to obtain your AVS? Mail a copy     Vitals - Patient Reported  Weight (Patient Reported): 83 kg (183 lb)  Height (Patient Reported): 175 cm (5' 8.9\")  BMI (Based on Pt Reported Ht/Wt): 27.1  Pain Score: No Pain (0)    Bre Graham [facility]      Assessment & Plan     Necrotizing pancreatitis  H/o idiopathic necrotizing pancreatitis (4/2020) c/b symptomatic walled-off necrosis s/p EUS drainage (7/4/20) and subsequent endoscopic necrosectomy with complete clearance. Had gastric outlet obstruction symptoms that have since resolved. Clinically, has essentially recovered.    Only remaining issue is the etiology. No ETOH history. No evidence for gallstone induced pancreatitis. Normal other labs/work up. We had requested an MRI/MRCP to help rule out mass lesion or structural causes for his pancreatitis. This was denied by his commercial insurance. [facility]. I did explain that in a small percentage of patients we aren't able to explain the cause (idiopathic) and while relatively low yield I do recommend getting an MRI/MRCP to rule out a reversible cause. If normal, no other work up needed and he can follow locally.       10 minutes spent on the date of the encounter doing chart review          Tobacco Cessation:   reports that he has been smoking cigars. He has quit using smokeless tobacco.      Follow up as needed.    César Almaguer MD  [facility]    Subjective     Norberto Almonte is a 65 year old who presents to clinic today for the following health issues     HPI   Mateus presents for telephone follow up visit. Clinically he reports doing very well since his last visit. He is able to eat normally without limitations. He exercises daily. His weight has been stable. He denies " abdominal pain, nausea/vomiting, fevers.     Review of Systems   CONSTITUTIONAL: NEGATIVE for fever, chills, change in weight  ENT/MOUTH: NEGATIVE for ear, mouth and throat problems  RESP: NEGATIVE for significant cough or SOB  CV: NEGATIVE for chest pain, palpitations or peripheral edema  ROS otherwise negative      Objective           Vitals:  No vitals were obtained today due to virtual visit.    Physical Exam   PSYCH: Alert and oriented times 3; coherent speech, normal   rate and volume, able to articulate logical thoughts, able   to abstract reason, no tangential thoughts, no hallucinations   or delusions    RESP: No cough, no audible wheezing, able to talk in full sentences  Remainder of exam unable to be completed due to telephone visits      Phone call duration: 20 minutes

## 2021-01-06 NOTE — LETTER
"    1/6/2021         RE: Norberto Almonte  [address]  [facility] [address]        Dear Colleague,    Thank you for referring your patient, Norberto Almonte, to the [facility]. Please see a copy of my visit note below.    Norberto Almonte is a 65 year old male who is being evaluated via a billable telephone visit.      What phone number would you like to be contacted at? [phone]  How would you like to obtain your AVS? 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 Had gastric outlet obstruction symptoms that have since resolved. Clinically, has essentially recovered.    Only remaining issue is the etiology. No ETOH history. No evidence for gallstone induced pancreatitis. Normal other labs/work up. We had requested an MRI/MRCP to help rule out mass lesion or structural causes for his pancreatitis. This was denied by his commercial insurance. [facility]. I did explain that in a small percentage of patients we aren't able to explain the cause (idiopathic) and while relatively low yield I do recommend getting an MRI/MRCP to rule out a reversible cause. If normal, no other work up needed and he can follow locally.       10 minutes spent on the date of the encounter doing chart review          Tobacco Cessation:   reports that he has been smoking cigars. He has quit using smokeless tobacco.      Follow up as needed.    César Almaguer MD  [facility]    Subjective     Norberto Almonte is a 65 year old who presents to clinic today " for the following health issues     HPI   Mateus presents for telephone follow up visit. Clinically he reports doing very well since his last visit. He is able to eat normally without limitations. He exercises daily. His weight has been stable. He denies abdominal pain, nausea/vomiting, fevers.     Review of Systems   CONSTITUTIONAL: NEGATIVE for fever, chills, change in weight  ENT/MOUTH: NEGATIVE for ear, mouth and throat problems  RESP: NEGATIVE for significant cough or SOB  CV: NEGATIVE for chest pain, palpitations or peripheral edema  ROS otherwise negative      Objective           Vitals:  No vitals were obtained today due to virtual visit.    Physical Exam   PSYCH: Alert and oriented times 3; coherent speech, normal   rate and volume, able to articulate logical thoughts, able   to abstract reason, no tangential thoughts, no hallucinations   or delusions    RESP: No cough, no audible wheezing, able to talk in full sentences  Remainder of exam unable to be completed due to telephone visits      Phone call duration: 20 minutes        Again, thank you for allowing me to participate in the care of your patient.        Sincerely,        César Almaguer MD

## 2021-01-07 PROBLEM — K55.059: Status: RESOLVED | Noted: 2021-01-07 | Resolved: 2021-01-07

## 2021-01-07 PROBLEM — K55.059: Status: ACTIVE | Noted: 2021-01-07

## 2021-01-15 ENCOUNTER — HEALTH MAINTENANCE LETTER (OUTPATIENT)
Age: 66
End: 2021-01-15

## 2021-01-18 ENCOUNTER — PATIENT OUTREACH (OUTPATIENT)
Dept: GASTROENTEROLOGY | Facility: CLINIC | Age: 66
End: 2021-01-18

## 2021-01-18 NOTE — PROGRESS NOTES
Care Coordination Telephone Call  [facility]     Called patient to discuss MRI.  This will be done this Friday at [place]    [facility] Ramirez[facility] [place] [facility];  [facility] done at [facility].       [facility] and has been discussed with Dr. Almaguer.    Libra GANN, HNBC, STAR-T  RN Care Coordinator  [facility]  Ph: [phone]  FAX: [phone]

## 2021-02-09 ENCOUNTER — PATIENT OUTREACH (OUTPATIENT)
Dept: GASTROENTEROLOGY | Facility: CLINIC | Age: 66
End: 2021-02-09

## 2021-02-09 NOTE — PROGRESS NOTES
Care Coordination Telephone Call  [facility]     Returned call to Shruthi at general surgery office.  They are looking for referral for cholecystectomy - [facility] as [facility] now.    Libra GANN, HNBC, STAR-T  RN Care Coordinator  [facility]  Ph: [phone]  FAX: [phone]

## 2021-05-01 ENCOUNTER — HEALTH MAINTENANCE LETTER (OUTPATIENT)
Age: 66
End: 2021-05-01

## 2021-08-15 ENCOUNTER — HEALTH MAINTENANCE LETTER (OUTPATIENT)
Age: 66
End: 2021-08-15

## 2021-10-10 ENCOUNTER — HEALTH MAINTENANCE LETTER (OUTPATIENT)
Age: 66
End: 2021-10-10

## 2021-12-05 ENCOUNTER — HEALTH MAINTENANCE LETTER (OUTPATIENT)
Age: 66
End: 2021-12-05

## 2022-01-30 ENCOUNTER — HEALTH MAINTENANCE LETTER (OUTPATIENT)
Age: 67
End: 2022-01-30

## 2022-03-27 ENCOUNTER — HEALTH MAINTENANCE LETTER (OUTPATIENT)
Age: 67
End: 2022-03-27

## 2022-07-17 ENCOUNTER — HEALTH MAINTENANCE LETTER (OUTPATIENT)
Age: 67
End: 2022-07-17

## 2022-09-13 NOTE — DISCHARGE INSTRUCTIONS
[facility]  Same-Day Surgery   Adult Discharge Orders & Instructions     For 24 hours after surgery    1. Get plenty of rest.  A responsible adult must stay with you for at least 24 hours after you leave the hospital.   2. Do not drive or use heavy equipment.  If you have weakness or tingling, don't drive or use heavy equipment until this feeling goes away.  3. Do not drink alcohol.  4. Avoid strenuous or risky activities.  Ask for help when climbing stairs.   5. You may feel lightheaded.  IF so, sit for a few minutes before standing.  Have someone help you get up.   6. If you have nausea (feel sick to your stomach): Drink only clear liquids such as apple juice, ginger ale, broth or 7-Up.  Rest may also help.  Be sure to drink enough fluids.  Move to a regular diet as you feel able.  7. You may have a slight fever. Call the doctor if your fever is over 100 F (37.7 C) (taken under the tongue) or lasts longer than 24 hours.  8. You may have a dry mouth, a sore throat, muscle aches or trouble sleeping.  These should go away after 24 hours.  9. Do not make important or legal decisions.   Call your doctor for any of the followin.  Signs of infection (fever, growing tenderness at the surgery site, a large amount of drainage or bleeding, severe pain, foul-smelling drainage, redness, swelling).    2. It has been over 8 to 10 hours since surgery and you are still not able to urinate (pass water).    3.  Headache for over 24 hours.    To contact Dr Almgauer [facility] call [phone]. If after hours or on the weekend call:        [phone] and ask for the resident on call for GI (answered 24 hours a day)      Emergency Department: [facility]: [phone]              In my judgment no risk for PPH has been identified at this time.

## 2022-09-24 ENCOUNTER — HEALTH MAINTENANCE LETTER (OUTPATIENT)
Age: 67
End: 2022-09-24

## 2023-01-29 ENCOUNTER — HEALTH MAINTENANCE LETTER (OUTPATIENT)
Age: 68
End: 2023-01-29

## 2023-05-08 ENCOUNTER — HEALTH MAINTENANCE LETTER (OUTPATIENT)
Age: 68
End: 2023-05-08

## 2023-10-08 ENCOUNTER — HEALTH MAINTENANCE LETTER (OUTPATIENT)
Age: 68
End: 2023-10-08

## 2024-02-25 ENCOUNTER — HEALTH MAINTENANCE LETTER (OUTPATIENT)
Age: 69
End: 2024-02-25

## (undated) DEVICE — ENDO SNARE POLYPECTOMY PEDIATRIC WIDE OVAL 27MM LOOP

## (undated) DEVICE — SYR ENTERAL W/ENFIT CONNECTOR PURPLE 60ML 460SE

## (undated) DEVICE — CATH BALLOON ELATION ESOPH/PYLORIC 15-16.5-18MMX180CM EPB15

## (undated) DEVICE — ENDO BITE BLOCK ADULT OMNI-BLOC

## (undated) DEVICE — ENDO CAP AND TUBING STERILE FOR ENDOGATOR  100130

## (undated) DEVICE — PAD CHUX UNDERPAD 23X24" 7136

## (undated) DEVICE — KIT ENDO FIRST STEP DISINFECTANT 200ML W/POUCH EP-4

## (undated) DEVICE — KIT CONNECTOR FOR OLYMPUS ENDOSCOPES DEFENDO 100310

## (undated) DEVICE — SOL WATER IRRIG 1000ML BOTTLE 2F7114

## (undated) DEVICE — SNARE CAPTIVATOR II POLYPECTOMY 33X240MM M00561291

## (undated) DEVICE — SNARE CAPIVATOR II POLYPECTOMY 15X240MM M00561230

## (undated) DEVICE — BALLOON EXTRACTION 15X1950MM 3.2MM TL B-V243Q-A

## (undated) DEVICE — PACK ENDOSCOPY GI CUSTOM UMMC

## (undated) DEVICE — INFLATION DEVICE BIG 60 ENDO-AN6012

## (undated) DEVICE — ENDO TUBING CO2 SMARTCAP STERILE DISP 100145CO2EXT

## (undated) DEVICE — ENDO PROBE COVER ULTRASOUND BALLOON LATEX  MAJ-249

## (undated) DEVICE — SNARE CAPIVATOR II POLYPECTOMY 20X240MM M00561240

## (undated) DEVICE — APPLICATOR COTTON TIP 3" 9325

## (undated) DEVICE — ENDO SNARE POLYPECTOMY SPIRAL 20MM LOOP SD-230U-20

## (undated) DEVICE — PEN MARKING SKIN TYCO DEVON DUAL TIP 31145868

## (undated) DEVICE — DEVICE SECURE BRIDLE 12FR FOR FEEDING AND NG TUBES 4-4112

## (undated) DEVICE — WIRE GUIDE 0.025"X270CM ANG VISIGLIDE G-240-2527A

## (undated) DEVICE — CATH RETRIEVAL BALLOON EXTRACTOR PRO RX-S INJ ABOVE 9-12MM

## (undated) DEVICE — WIRE GUIDE 0.025"X450CM STR VISIGLIDE G-240-2545S

## (undated) DEVICE — GUIDEWIRE TERUMO .035X260 3CM STR TIP GS3504

## (undated) DEVICE — SOL NACL 0.9% IRRIG 1000ML BOTTLE 2F7124

## (undated) DEVICE — CATH BALLOON ELATION ESOPH/PYLORIC 12-13.5-15MMX180CM EPB12

## (undated) DEVICE — TUBE NASOGASTRIC FEEDING CORFLO ENFIT 12FRX22" 50-4602

## (undated) DEVICE — SYR 30ML SLIP TIP W/O NDL 302833

## (undated) DEVICE — WIRE GUIDE 0.025"X450CM ANG VISIGLIDE G-240-2545A

## (undated) RX ORDER — SIMETHICONE 40MG/0.6ML
SUSPENSION, DROPS(FINAL DOSAGE FORM)(ML) ORAL
Status: DISPENSED
Start: 2020-07-04

## (undated) RX ORDER — ONDANSETRON 2 MG/ML
INJECTION INTRAMUSCULAR; INTRAVENOUS
Status: DISPENSED
Start: 2020-07-04

## (undated) RX ORDER — FENTANYL CITRATE 50 UG/ML
INJECTION, SOLUTION INTRAMUSCULAR; INTRAVENOUS
Status: DISPENSED
Start: 2020-07-15

## (undated) RX ORDER — PROPOFOL 10 MG/ML
INJECTION, EMULSION INTRAVENOUS
Status: DISPENSED
Start: 2020-07-15

## (undated) RX ORDER — ESMOLOL HYDROCHLORIDE 10 MG/ML
INJECTION INTRAVENOUS
Status: DISPENSED
Start: 2020-08-06

## (undated) RX ORDER — FENTANYL CITRATE-0.9 % NACL/PF 10 MCG/ML
PLASTIC BAG, INJECTION (ML) INTRAVENOUS
Status: DISPENSED
Start: 2020-08-06

## (undated) RX ORDER — DEXAMETHASONE SODIUM PHOSPHATE 4 MG/ML
INJECTION, SOLUTION INTRA-ARTICULAR; INTRALESIONAL; INTRAMUSCULAR; INTRAVENOUS; SOFT TISSUE
Status: DISPENSED
Start: 2020-07-15

## (undated) RX ORDER — PROPOFOL 10 MG/ML
INJECTION, EMULSION INTRAVENOUS
Status: DISPENSED
Start: 2020-07-04

## (undated) RX ORDER — LIDOCAINE HYDROCHLORIDE 20 MG/ML
INJECTION, SOLUTION EPIDURAL; INFILTRATION; INTRACAUDAL; PERINEURAL
Status: DISPENSED
Start: 2020-07-15

## (undated) RX ORDER — FENTANYL CITRATE 50 UG/ML
INJECTION, SOLUTION INTRAMUSCULAR; INTRAVENOUS
Status: DISPENSED
Start: 2020-08-06

## (undated) RX ORDER — IOPAMIDOL 510 MG/ML
INJECTION, SOLUTION INTRAVASCULAR
Status: DISPENSED
Start: 2020-08-06

## (undated) RX ORDER — DEXAMETHASONE SODIUM PHOSPHATE 4 MG/ML
INJECTION, SOLUTION INTRA-ARTICULAR; INTRALESIONAL; INTRAMUSCULAR; INTRAVENOUS; SOFT TISSUE
Status: DISPENSED
Start: 2020-07-04

## (undated) RX ORDER — IOPAMIDOL 510 MG/ML
INJECTION, SOLUTION INTRAVASCULAR
Status: DISPENSED
Start: 2020-08-24

## (undated) RX ORDER — SIMETHICONE 40MG/0.6ML
SUSPENSION, DROPS(FINAL DOSAGE FORM)(ML) ORAL
Status: DISPENSED
Start: 2020-08-24

## (undated) RX ORDER — ONDANSETRON 2 MG/ML
INJECTION INTRAMUSCULAR; INTRAVENOUS
Status: DISPENSED
Start: 2020-07-15

## (undated) RX ORDER — IOPAMIDOL 510 MG/ML
INJECTION, SOLUTION INTRAVASCULAR
Status: DISPENSED
Start: 2020-07-04

## (undated) RX ORDER — INDOMETHACIN 50 MG/1
SUPPOSITORY RECTAL
Status: DISPENSED
Start: 2020-07-04

## (undated) RX ORDER — ONDANSETRON 2 MG/ML
INJECTION INTRAMUSCULAR; INTRAVENOUS
Status: DISPENSED
Start: 2020-08-06

## (undated) RX ORDER — LIDOCAINE HYDROCHLORIDE 20 MG/ML
INJECTION, SOLUTION EPIDURAL; INFILTRATION; INTRACAUDAL; PERINEURAL
Status: DISPENSED
Start: 2020-07-04

## (undated) RX ORDER — FENTANYL CITRATE 50 UG/ML
INJECTION, SOLUTION INTRAMUSCULAR; INTRAVENOUS
Status: DISPENSED
Start: 2020-08-24

## (undated) RX ORDER — LEVOFLOXACIN 5 MG/ML
INJECTION, SOLUTION INTRAVENOUS
Status: DISPENSED
Start: 2020-08-06